# Patient Record
Sex: FEMALE | Race: WHITE | Employment: OTHER | URBAN - METROPOLITAN AREA
[De-identification: names, ages, dates, MRNs, and addresses within clinical notes are randomized per-mention and may not be internally consistent; named-entity substitution may affect disease eponyms.]

---

## 2018-01-17 ENCOUNTER — OFFICE VISIT (OUTPATIENT)
Dept: PODIATRY | Age: 63
End: 2018-01-17
Payer: MEDICARE

## 2018-01-17 VITALS
DIASTOLIC BLOOD PRESSURE: 82 MMHG | SYSTOLIC BLOOD PRESSURE: 128 MMHG | BODY MASS INDEX: 29.73 KG/M2 | WEIGHT: 185 LBS | RESPIRATION RATE: 16 BRPM | HEIGHT: 66 IN | TEMPERATURE: 98.4 F | HEART RATE: 72 BPM

## 2018-01-17 DIAGNOSIS — M79.671 PAIN IN BOTH FEET: ICD-10-CM

## 2018-01-17 DIAGNOSIS — M72.2 PLANTAR FASCIAL FIBROMATOSIS: Primary | ICD-10-CM

## 2018-01-17 DIAGNOSIS — M79.672 PAIN IN BOTH FEET: ICD-10-CM

## 2018-01-17 PROCEDURE — 4004F PT TOBACCO SCREEN RCVD TLK: CPT | Performed by: PODIATRIST

## 2018-01-17 PROCEDURE — 20550 NJX 1 TENDON SHEATH/LIGAMENT: CPT | Performed by: PODIATRIST

## 2018-01-17 PROCEDURE — 99203 OFFICE O/P NEW LOW 30 MIN: CPT | Performed by: PODIATRIST

## 2018-01-17 PROCEDURE — 3017F COLORECTAL CA SCREEN DOC REV: CPT | Performed by: PODIATRIST

## 2018-01-17 PROCEDURE — 3014F SCREEN MAMMO DOC REV: CPT | Performed by: PODIATRIST

## 2018-01-17 PROCEDURE — G8427 DOCREV CUR MEDS BY ELIG CLIN: HCPCS | Performed by: PODIATRIST

## 2018-01-17 PROCEDURE — G8419 CALC BMI OUT NRM PARAM NOF/U: HCPCS | Performed by: PODIATRIST

## 2018-01-17 PROCEDURE — G8484 FLU IMMUNIZE NO ADMIN: HCPCS | Performed by: PODIATRIST

## 2018-01-17 RX ORDER — BETAMETHASONE SODIUM PHOSPHATE AND BETAMETHASONE ACETATE 3; 3 MG/ML; MG/ML
12 INJECTION, SUSPENSION INTRA-ARTICULAR; INTRALESIONAL; INTRAMUSCULAR; SOFT TISSUE ONCE
Status: COMPLETED | OUTPATIENT
Start: 2018-01-17 | End: 2018-01-18

## 2018-01-17 RX ORDER — CLONAZEPAM 1 MG/1
1 TABLET ORAL
COMMUNITY
End: 2018-05-22

## 2018-01-17 RX ORDER — ASPIRIN 81 MG/1
1 TABLET, CHEWABLE ORAL
COMMUNITY
Start: 2016-01-19 | End: 2018-05-22

## 2018-01-17 RX ORDER — CLONAZEPAM 1 MG/1
TABLET ORAL
COMMUNITY
Start: 2017-11-13 | End: 2018-05-22

## 2018-01-18 RX ADMIN — BETAMETHASONE SODIUM PHOSPHATE AND BETAMETHASONE ACETATE 12 MG: 3; 3 INJECTION, SUSPENSION INTRA-ARTICULAR; INTRALESIONAL; INTRAMUSCULAR; SOFT TISSUE at 07:35

## 2018-02-14 ENCOUNTER — OFFICE VISIT (OUTPATIENT)
Dept: PODIATRY | Age: 63
End: 2018-02-14
Payer: MEDICARE

## 2018-02-14 VITALS — WEIGHT: 185 LBS | BODY MASS INDEX: 29.73 KG/M2 | HEIGHT: 66 IN

## 2018-02-14 DIAGNOSIS — M79.671 PAIN IN RIGHT FOOT: ICD-10-CM

## 2018-02-14 DIAGNOSIS — M72.2 PLANTAR FASCIAL FIBROMATOSIS: Primary | ICD-10-CM

## 2018-02-14 PROBLEM — F17.200 CURRENT SMOKER: Status: ACTIVE | Noted: 2018-01-05

## 2018-02-14 PROBLEM — G47.9 SLEEP DISTURBANCE: Status: ACTIVE | Noted: 2017-07-05

## 2018-02-14 PROBLEM — Z12.39 BREAST SCREENING: Status: ACTIVE | Noted: 2017-07-05

## 2018-02-14 PROBLEM — F41.8 MIXED ANXIETY DEPRESSIVE DISORDER: Status: ACTIVE | Noted: 2017-07-05

## 2018-02-14 PROBLEM — G25.81 RLS (RESTLESS LEGS SYNDROME): Status: ACTIVE | Noted: 2017-12-28

## 2018-02-14 PROBLEM — G25.81 RESTLESS LEGS SYNDROME: Status: ACTIVE | Noted: 2017-07-05

## 2018-02-14 PROCEDURE — 3014F SCREEN MAMMO DOC REV: CPT | Performed by: PODIATRIST

## 2018-02-14 PROCEDURE — G8484 FLU IMMUNIZE NO ADMIN: HCPCS | Performed by: PODIATRIST

## 2018-02-14 PROCEDURE — G8419 CALC BMI OUT NRM PARAM NOF/U: HCPCS | Performed by: PODIATRIST

## 2018-02-14 PROCEDURE — 4004F PT TOBACCO SCREEN RCVD TLK: CPT | Performed by: PODIATRIST

## 2018-02-14 PROCEDURE — G8427 DOCREV CUR MEDS BY ELIG CLIN: HCPCS | Performed by: PODIATRIST

## 2018-02-14 PROCEDURE — 3017F COLORECTAL CA SCREEN DOC REV: CPT | Performed by: PODIATRIST

## 2018-02-14 PROCEDURE — 99213 OFFICE O/P EST LOW 20 MIN: CPT | Performed by: PODIATRIST

## 2018-03-29 ENCOUNTER — OFFICE VISIT (OUTPATIENT)
Dept: PODIATRY | Age: 63
End: 2018-03-29
Payer: MEDICARE

## 2018-03-29 DIAGNOSIS — M79.671 RIGHT FOOT PAIN: ICD-10-CM

## 2018-03-29 DIAGNOSIS — M79.671 PAIN OF RIGHT HEEL: Primary | ICD-10-CM

## 2018-03-29 DIAGNOSIS — M72.2 PLANTAR FASCIITIS: ICD-10-CM

## 2018-03-29 PROBLEM — Z12.31 SCREENING MAMMOGRAM, ENCOUNTER FOR: Status: ACTIVE | Noted: 2017-07-05

## 2018-03-29 PROCEDURE — 3014F SCREEN MAMMO DOC REV: CPT | Performed by: PODIATRIST

## 2018-03-29 PROCEDURE — 4004F PT TOBACCO SCREEN RCVD TLK: CPT | Performed by: PODIATRIST

## 2018-03-29 PROCEDURE — 20550 NJX 1 TENDON SHEATH/LIGAMENT: CPT | Performed by: PODIATRIST

## 2018-03-29 PROCEDURE — G8419 CALC BMI OUT NRM PARAM NOF/U: HCPCS | Performed by: PODIATRIST

## 2018-03-29 PROCEDURE — 3017F COLORECTAL CA SCREEN DOC REV: CPT | Performed by: PODIATRIST

## 2018-03-29 PROCEDURE — 99213 OFFICE O/P EST LOW 20 MIN: CPT | Performed by: PODIATRIST

## 2018-03-29 PROCEDURE — L3020 FOOT LONGITUD/METATARSAL SUP: HCPCS | Performed by: PODIATRIST

## 2018-03-29 PROCEDURE — G8484 FLU IMMUNIZE NO ADMIN: HCPCS | Performed by: PODIATRIST

## 2018-03-29 PROCEDURE — G8427 DOCREV CUR MEDS BY ELIG CLIN: HCPCS | Performed by: PODIATRIST

## 2018-03-29 RX ORDER — BETAMETHASONE SODIUM PHOSPHATE AND BETAMETHASONE ACETATE 3; 3 MG/ML; MG/ML
6 INJECTION, SUSPENSION INTRA-ARTICULAR; INTRALESIONAL; INTRAMUSCULAR; SOFT TISSUE ONCE
Status: COMPLETED | OUTPATIENT
Start: 2018-03-29 | End: 2018-03-29

## 2018-03-29 RX ADMIN — BETAMETHASONE SODIUM PHOSPHATE AND BETAMETHASONE ACETATE 6 MG: 3; 3 INJECTION, SUSPENSION INTRA-ARTICULAR; INTRALESIONAL; INTRAMUSCULAR; SOFT TISSUE at 09:07

## 2018-03-29 NOTE — PROGRESS NOTES
level of the digits, Bilateral.  Edema absent, Bilateral.  Varicosities absent, Bilateral. Erythema absent, Bilateral    Neurological: Sensation intact to light touch to level of digits, Bilateral.  Protective sensation intact 10/10 sites via 5.07/10g Pelham-Lionel Monofilament, Bilateral.  negative Tinel's, Bilateral.  negative Valleix sign, Bilateral.      Integument: Warm, dry, supple, Bilateral.  Open lesion absent, Bilateral.  Interdigital maceration absent to web spaces 1-4, Bilateral.  Nails are normal in length, thickness and color 1-5 bilateral.  Fissures absent, Bilateral.         Asessment: Patient is a 66 y.o. female with:   1. Pain of right heel  33565 - [place] INJECT TENDON SHEATH/LIGAMENT   2. Right foot pain  97251 - [place] INJECT TENDON SHEATH/LIGAMENT   3. Plantar fasciitis  66509 - [place] INJECT TENDON SHEATH/LIGAMENT    [place] FOOT LONGITUD/METATARSAL SUP    [place] FOOT LONGITUD/METATARSAL SUP       Plan: Patient examined and evaluated. Current condition and treatment options discussed in detail. Advised pt to her condiont. After obtaining verbal consent from the patient the right medial calcaneus was cleansed with an isopropyl alcohol swab. Topical ethyl chloride was then applied to the site. And injection was informed consisting of a 3:1 mix of 0.5% Marcaine plain and dexamethasone phosphate/dexamethasone acetate (6MG/ML). The patient was educated about the possibility of steroid flair. The patient is to call any questions, comments, or concerns. Patient Instructions: Plantar Fasciitis    Plantar Fasciitis is inflammation of the long fibrous band on the bottom of the foot (plantar fascia), especially in the area of its attachment to the heel bone (calcaneus). The plantar fascia is also intimately related to the Achilles tendon and therefore stretching of the calf muscles is also important for treatment.          1. Stretching: Perform 3-4 times daily, 2-3 minutes per side      -Before getting out of bed,

## 2018-04-28 PROBLEM — Z12.31 SCREENING MAMMOGRAM, ENCOUNTER FOR: Status: RESOLVED | Noted: 2017-07-05 | Resolved: 2018-04-28

## 2018-05-01 ENCOUNTER — OFFICE VISIT (OUTPATIENT)
Dept: PODIATRY | Age: 63
End: 2018-05-01
Payer: MEDICARE

## 2018-05-01 VITALS — RESPIRATION RATE: 16 BRPM | BODY MASS INDEX: 28.04 KG/M2 | HEART RATE: 68 BPM | HEIGHT: 68 IN | WEIGHT: 185 LBS

## 2018-05-01 DIAGNOSIS — M72.2 PLANTAR FASCIAL FIBROMATOSIS: Primary | ICD-10-CM

## 2018-05-01 DIAGNOSIS — M79.671 INTRACTABLE RIGHT HEEL PAIN: ICD-10-CM

## 2018-05-01 PROCEDURE — 4004F PT TOBACCO SCREEN RCVD TLK: CPT | Performed by: PODIATRIST

## 2018-05-01 PROCEDURE — 3017F COLORECTAL CA SCREEN DOC REV: CPT | Performed by: PODIATRIST

## 2018-05-01 PROCEDURE — 99213 OFFICE O/P EST LOW 20 MIN: CPT | Performed by: PODIATRIST

## 2018-05-01 PROCEDURE — G8419 CALC BMI OUT NRM PARAM NOF/U: HCPCS | Performed by: PODIATRIST

## 2018-05-01 PROCEDURE — G8427 DOCREV CUR MEDS BY ELIG CLIN: HCPCS | Performed by: PODIATRIST

## 2018-05-22 ENCOUNTER — OFFICE VISIT (OUTPATIENT)
Dept: FAMILY MEDICINE CLINIC | Age: 63
End: 2018-05-22
Payer: MEDICARE

## 2018-05-22 VITALS
WEIGHT: 209.4 LBS | HEIGHT: 66 IN | DIASTOLIC BLOOD PRESSURE: 72 MMHG | SYSTOLIC BLOOD PRESSURE: 104 MMHG | HEART RATE: 72 BPM | BODY MASS INDEX: 33.65 KG/M2

## 2018-05-22 DIAGNOSIS — F32.A DEPRESSION, UNSPECIFIED DEPRESSION TYPE: Primary | ICD-10-CM

## 2018-05-22 PROCEDURE — G8427 DOCREV CUR MEDS BY ELIG CLIN: HCPCS | Performed by: FAMILY MEDICINE

## 2018-05-22 PROCEDURE — G8417 CALC BMI ABV UP PARAM F/U: HCPCS | Performed by: FAMILY MEDICINE

## 2018-05-22 PROCEDURE — 99203 OFFICE O/P NEW LOW 30 MIN: CPT | Performed by: FAMILY MEDICINE

## 2018-05-22 PROCEDURE — 3017F COLORECTAL CA SCREEN DOC REV: CPT | Performed by: FAMILY MEDICINE

## 2018-05-22 PROCEDURE — 4004F PT TOBACCO SCREEN RCVD TLK: CPT | Performed by: FAMILY MEDICINE

## 2018-05-22 RX ORDER — MIRTAZAPINE 15 MG/1
15 TABLET, FILM COATED ORAL NIGHTLY
COMMUNITY
End: 2018-05-22 | Stop reason: DRUGHIGH

## 2018-05-22 RX ORDER — MIRTAZAPINE 30 MG/1
30 TABLET, FILM COATED ORAL NIGHTLY
Qty: 30 TABLET | Refills: 5 | Status: SHIPPED | OUTPATIENT
Start: 2018-05-22 | End: 2018-09-11 | Stop reason: SDUPTHER

## 2018-05-22 RX ORDER — ROPINIROLE 1 MG/1
1 TABLET, FILM COATED ORAL 4 TIMES DAILY
COMMUNITY
End: 2018-09-11 | Stop reason: SDUPTHER

## 2018-05-22 RX ORDER — CLONAZEPAM 1 MG/1
1 TABLET ORAL DAILY
Status: ON HOLD | COMMUNITY
End: 2018-06-30 | Stop reason: HOSPADM

## 2018-05-22 RX ORDER — PREGABALIN 150 MG/1
150 CAPSULE ORAL 2 TIMES DAILY
COMMUNITY
End: 2018-09-11 | Stop reason: SDUPTHER

## 2018-05-22 ASSESSMENT — PATIENT HEALTH QUESTIONNAIRE - PHQ9
9. THOUGHTS THAT YOU WOULD BE BETTER OFF DEAD, OR OF HURTING YOURSELF: 1
3. TROUBLE FALLING OR STAYING ASLEEP: 3
2. FEELING DOWN, DEPRESSED OR HOPELESS: 3
5. POOR APPETITE OR OVEREATING: 3
SUM OF ALL RESPONSES TO PHQ QUESTIONS 1-9: 22
7. TROUBLE CONCENTRATING ON THINGS, SUCH AS READING THE NEWSPAPER OR WATCHING TELEVISION: 0
4. FEELING TIRED OR HAVING LITTLE ENERGY: 3
1. LITTLE INTEREST OR PLEASURE IN DOING THINGS: 3
SUM OF ALL RESPONSES TO PHQ9 QUESTIONS 1 & 2: 6
10. IF YOU CHECKED OFF ANY PROBLEMS, HOW DIFFICULT HAVE THESE PROBLEMS MADE IT FOR YOU TO DO YOUR WORK, TAKE CARE OF THINGS AT HOME, OR GET ALONG WITH OTHER PEOPLE: 0
8. MOVING OR SPEAKING SO SLOWLY THAT OTHER PEOPLE COULD HAVE NOTICED. OR THE OPPOSITE, BEING SO FIGETY OR RESTLESS THAT YOU HAVE BEEN MOVING AROUND A LOT MORE THAN USUAL: 3
6. FEELING BAD ABOUT YOURSELF - OR THAT YOU ARE A FAILURE OR HAVE LET YOURSELF OR YOUR FAMILY DOWN: 3

## 2018-06-07 ENCOUNTER — INITIAL CONSULT (OUTPATIENT)
Dept: BEHAVIORAL/MENTAL HEALTH | Age: 63
End: 2018-06-07
Payer: MEDICARE

## 2018-06-07 DIAGNOSIS — F33.1 MAJOR DEPRESSIVE DISORDER, RECURRENT EPISODE, MODERATE DEGREE (HCC): Primary | ICD-10-CM

## 2018-06-07 PROCEDURE — 90791 PSYCH DIAGNOSTIC EVALUATION: CPT | Performed by: SOCIAL WORKER

## 2018-06-28 ENCOUNTER — APPOINTMENT (OUTPATIENT)
Dept: GENERAL RADIOLOGY | Age: 63
DRG: 812 | End: 2018-06-28
Payer: MEDICARE

## 2018-06-28 ENCOUNTER — APPOINTMENT (OUTPATIENT)
Dept: CT IMAGING | Age: 63
DRG: 812 | End: 2018-06-28
Payer: MEDICARE

## 2018-06-28 ENCOUNTER — HOSPITAL ENCOUNTER (INPATIENT)
Age: 63
LOS: 2 days | Discharge: HOME | DRG: 812 | End: 2018-06-30
Attending: EMERGENCY MEDICINE | Admitting: INTERNAL MEDICINE
Payer: MEDICARE

## 2018-06-28 DIAGNOSIS — R41.82 ALTERED MENTAL STATUS, UNSPECIFIED ALTERED MENTAL STATUS TYPE: Primary | ICD-10-CM

## 2018-06-28 PROBLEM — E04.1 THYROID NODULE: Status: ACTIVE | Noted: 2018-06-28

## 2018-06-28 LAB
-: NORMAL
ABSOLUTE EOS #: 0.14 K/UL (ref 0–0.44)
ABSOLUTE IMMATURE GRANULOCYTE: 0.05 K/UL (ref 0–0.3)
ABSOLUTE LYMPH #: 2.29 K/UL (ref 1.1–3.7)
ABSOLUTE MONO #: 0.82 K/UL (ref 0.1–1.2)
ACETAMINOPHEN LEVEL: <5 UG/ML (ref 10–30)
ALBUMIN SERPL-MCNC: 4.2 G/DL (ref 3.5–5.2)
ALBUMIN/GLOBULIN RATIO: 1.4 (ref 1–2.5)
ALLEN TEST: NORMAL
ALP BLD-CCNC: 82 U/L (ref 35–104)
ALT SERPL-CCNC: 25 U/L (ref 5–33)
AMMONIA: 63 UMOL/L (ref 11–51)
AMORPHOUS: NORMAL
AMPHETAMINE SCREEN URINE: NEGATIVE
ANION GAP SERPL CALCULATED.3IONS-SCNC: 10 MMOL/L (ref 9–17)
ANION GAP: 5 MMOL/L (ref 7–16)
AST SERPL-CCNC: 41 U/L
BACTERIA: NORMAL
BARBITURATE SCREEN URINE: NEGATIVE
BASOPHILS # BLD: 0 % (ref 0–2)
BASOPHILS ABSOLUTE: 0.05 K/UL (ref 0–0.2)
BENZODIAZEPINE SCREEN, URINE: POSITIVE
BILIRUB SERPL-MCNC: 0.37 MG/DL (ref 0.3–1.2)
BILIRUBIN DIRECT: <0.08 MG/DL
BILIRUBIN URINE: NEGATIVE
BILIRUBIN, INDIRECT: ABNORMAL MG/DL (ref 0–1)
BUN BLDV-MCNC: 13 MG/DL (ref 8–23)
BUN/CREAT BLD: NORMAL (ref 9–20)
BUPRENORPHINE URINE: ABNORMAL
CALCIUM SERPL-MCNC: 9 MG/DL (ref 8.6–10.4)
CANNABINOID SCREEN URINE: NEGATIVE
CASTS UA: NORMAL /LPF (ref 0–8)
CHLORIDE BLD-SCNC: 106 MMOL/L (ref 98–107)
CO2: 27 MMOL/L (ref 20–31)
COCAINE METABOLITE, URINE: NEGATIVE
COLOR: YELLOW
COMMENT UA: ABNORMAL
CREAT SERPL-MCNC: 0.53 MG/DL (ref 0.5–0.9)
CRYSTALS, UA: NORMAL /HPF
DIFFERENTIAL TYPE: ABNORMAL
EKG ATRIAL RATE: 92 BPM
EKG P AXIS: 41 DEGREES
EKG P-R INTERVAL: 138 MS
EKG Q-T INTERVAL: 374 MS
EKG QRS DURATION: 92 MS
EKG QTC CALCULATION (BAZETT): 462 MS
EKG R AXIS: 22 DEGREES
EKG T AXIS: 4 DEGREES
EKG VENTRICULAR RATE: 92 BPM
EOSINOPHILS RELATIVE PERCENT: 1 % (ref 1–4)
EPITHELIAL CELLS UA: NORMAL /HPF (ref 0–5)
ETHANOL PERCENT: <0.01 %
ETHANOL: <10 MG/DL
FIO2: NORMAL
GFR AFRICAN AMERICAN: >60 ML/MIN
GFR NON-AFRICAN AMERICAN: >60 ML/MIN
GFR NON-AFRICAN AMERICAN: >60 ML/MIN
GFR SERPL CREATININE-BSD FRML MDRD: >60 ML/MIN
GFR SERPL CREATININE-BSD FRML MDRD: NORMAL ML/MIN/{1.73_M2}
GLOBULIN: ABNORMAL G/DL (ref 1.5–3.8)
GLUCOSE BLD-MCNC: 87 MG/DL (ref 74–100)
GLUCOSE BLD-MCNC: 90 MG/DL (ref 70–99)
GLUCOSE URINE: NEGATIVE
HCO3 VENOUS: 27.6 MMOL/L (ref 22–29)
HCT VFR BLD CALC: 50.5 % (ref 36.3–47.1)
HEMOGLOBIN: 16.6 G/DL (ref 11.9–15.1)
IMMATURE GRANULOCYTES: 0 %
KETONES, URINE: NEGATIVE
LACTIC ACID, WHOLE BLOOD: 3.3 MMOL/L (ref 0.7–2.1)
LEUKOCYTE ESTERASE, URINE: NEGATIVE
LIPASE: 12 U/L (ref 13–60)
LYMPHOCYTES # BLD: 16 % (ref 24–43)
MCH RBC QN AUTO: 31.6 PG (ref 25.2–33.5)
MCHC RBC AUTO-ENTMCNC: 32.9 G/DL (ref 28.4–34.8)
MCV RBC AUTO: 96 FL (ref 82.6–102.9)
MDMA URINE: ABNORMAL
METHADONE SCREEN, URINE: NEGATIVE
METHAMPHETAMINE, URINE: ABNORMAL
MODE: NORMAL
MONOCYTES # BLD: 6 % (ref 3–12)
MUCUS: NORMAL
MYOGLOBIN: 200 NG/ML (ref 25–58)
NEGATIVE BASE EXCESS, VEN: NORMAL (ref 0–2)
NITRITE, URINE: NEGATIVE
NRBC AUTOMATED: 0 PER 100 WBC
O2 DEVICE/FLOW/%: NORMAL
O2 SAT, VEN: 65 % (ref 60–85)
OPIATES, URINE: NEGATIVE
OTHER OBSERVATIONS UA: NORMAL
OXYCODONE SCREEN URINE: NEGATIVE
PATIENT TEMP: NORMAL
PCO2, VEN: 50 MM HG (ref 41–51)
PDW BLD-RTO: 13.5 % (ref 11.8–14.4)
PH UA: 7 (ref 5–8)
PH VENOUS: 7.35 (ref 7.32–7.43)
PHENCYCLIDINE, URINE: NEGATIVE
PLATELET # BLD: 239 K/UL (ref 138–453)
PLATELET ESTIMATE: ABNORMAL
PMV BLD AUTO: 10.2 FL (ref 8.1–13.5)
PO2, VEN: 35.7 MM HG (ref 30–50)
POC CHLORIDE: 107 MMOL/L (ref 98–107)
POC CREATININE: 0.73 MG/DL (ref 0.51–1.19)
POC HEMATOCRIT: 54 % (ref 36–46)
POC HEMOGLOBIN: 18.5 G/DL (ref 12–16)
POC IONIZED CALCIUM: 1.08 MMOL/L (ref 1.15–1.33)
POC LACTIC ACID: 2 MMOL/L (ref 0.56–1.39)
POC PCO2 TEMP: NORMAL MM HG
POC PH TEMP: NORMAL
POC PO2 TEMP: NORMAL MM HG
POC POTASSIUM: 7.5 MMOL/L (ref 3.5–4.5)
POC SODIUM: 140 MMOL/L (ref 138–146)
POC TROPONIN I: 0 NG/ML (ref 0–0.1)
POC TROPONIN INTERP: NORMAL
POSITIVE BASE EXCESS, VEN: 1 (ref 0–3)
POTASSIUM SERPL-SCNC: 4.6 MMOL/L (ref 3.7–5.3)
PROLACTIN: 11.77 UG/L (ref 4.79–23.3)
PROPOXYPHENE, URINE: ABNORMAL
PROTEIN UA: NEGATIVE
RBC # BLD: 5.26 M/UL (ref 3.95–5.11)
RBC # BLD: ABNORMAL 10*6/UL
RBC UA: NORMAL /HPF (ref 0–4)
RENAL EPITHELIAL, UA: NORMAL /HPF
SALICYLATE LEVEL: <1 MG/DL (ref 3–10)
SAMPLE SITE: NORMAL
SEG NEUTROPHILS: 77 % (ref 36–65)
SEGMENTED NEUTROPHILS ABSOLUTE COUNT: 11 K/UL (ref 1.5–8.1)
SODIUM BLD-SCNC: 143 MMOL/L (ref 135–144)
SPECIFIC GRAVITY UA: 1.01 (ref 1–1.03)
TEST INFORMATION: ABNORMAL
TOTAL CK: 1193 U/L (ref 26–192)
TOTAL CO2, VENOUS: 29 MMOL/L (ref 23–30)
TOTAL PROTEIN: 7.1 G/DL (ref 6.4–8.3)
TOXIC TRICYCLIC SC,BLOOD: NEGATIVE
TRICHOMONAS: NORMAL
TRICYCLIC ANTIDEPRESSANTS, UR: ABNORMAL
TROPONIN INTERP: NORMAL
TROPONIN T: <0.03 NG/ML
TURBIDITY: CLEAR
URINE HGB: ABNORMAL
UROBILINOGEN, URINE: NORMAL
WBC # BLD: 14.4 K/UL (ref 3.5–11.3)
WBC # BLD: ABNORMAL 10*3/UL
WBC UA: NORMAL /HPF (ref 0–5)
YEAST: NORMAL

## 2018-06-28 PROCEDURE — 81001 URINALYSIS AUTO W/SCOPE: CPT

## 2018-06-28 PROCEDURE — 72125 CT NECK SPINE W/O DYE: CPT

## 2018-06-28 PROCEDURE — 93005 ELECTROCARDIOGRAM TRACING: CPT

## 2018-06-28 PROCEDURE — 82435 ASSAY OF BLOOD CHLORIDE: CPT

## 2018-06-28 PROCEDURE — 2580000003 HC RX 258: Performed by: INTERNAL MEDICINE

## 2018-06-28 PROCEDURE — 2580000003 HC RX 258: Performed by: HOSPITALIST

## 2018-06-28 PROCEDURE — 6370000000 HC RX 637 (ALT 250 FOR IP): Performed by: EMERGENCY MEDICINE

## 2018-06-28 PROCEDURE — 82330 ASSAY OF CALCIUM: CPT

## 2018-06-28 PROCEDURE — 82803 BLOOD GASES ANY COMBINATION: CPT

## 2018-06-28 PROCEDURE — G0480 DRUG TEST DEF 1-7 CLASSES: HCPCS

## 2018-06-28 PROCEDURE — 87040 BLOOD CULTURE FOR BACTERIA: CPT

## 2018-06-28 PROCEDURE — 85014 HEMATOCRIT: CPT

## 2018-06-28 PROCEDURE — 83874 ASSAY OF MYOGLOBIN: CPT

## 2018-06-28 PROCEDURE — 84146 ASSAY OF PROLACTIN: CPT

## 2018-06-28 PROCEDURE — 82565 ASSAY OF CREATININE: CPT

## 2018-06-28 PROCEDURE — 6370000000 HC RX 637 (ALT 250 FOR IP): Performed by: HOSPITALIST

## 2018-06-28 PROCEDURE — 71045 X-RAY EXAM CHEST 1 VIEW: CPT

## 2018-06-28 PROCEDURE — 1200000000 HC SEMI PRIVATE

## 2018-06-28 PROCEDURE — 84132 ASSAY OF SERUM POTASSIUM: CPT

## 2018-06-28 PROCEDURE — 80307 DRUG TEST PRSMV CHEM ANLYZR: CPT

## 2018-06-28 PROCEDURE — 84295 ASSAY OF SERUM SODIUM: CPT

## 2018-06-28 PROCEDURE — 82140 ASSAY OF AMMONIA: CPT

## 2018-06-28 PROCEDURE — 80076 HEPATIC FUNCTION PANEL: CPT

## 2018-06-28 PROCEDURE — 82947 ASSAY GLUCOSE BLOOD QUANT: CPT

## 2018-06-28 PROCEDURE — 83605 ASSAY OF LACTIC ACID: CPT

## 2018-06-28 PROCEDURE — 83690 ASSAY OF LIPASE: CPT

## 2018-06-28 PROCEDURE — 36415 COLL VENOUS BLD VENIPUNCTURE: CPT

## 2018-06-28 PROCEDURE — 6360000002 HC RX W HCPCS: Performed by: HOSPITALIST

## 2018-06-28 PROCEDURE — 85025 COMPLETE CBC W/AUTO DIFF WBC: CPT

## 2018-06-28 PROCEDURE — 82550 ASSAY OF CK (CPK): CPT

## 2018-06-28 PROCEDURE — 70450 CT HEAD/BRAIN W/O DYE: CPT

## 2018-06-28 PROCEDURE — 80048 BASIC METABOLIC PNL TOTAL CA: CPT

## 2018-06-28 PROCEDURE — 84484 ASSAY OF TROPONIN QUANT: CPT

## 2018-06-28 PROCEDURE — 99285 EMERGENCY DEPT VISIT HI MDM: CPT

## 2018-06-28 RX ORDER — ONDANSETRON 2 MG/ML
4 INJECTION INTRAMUSCULAR; INTRAVENOUS EVERY 6 HOURS PRN
Status: DISCONTINUED | OUTPATIENT
Start: 2018-06-28 | End: 2018-06-30 | Stop reason: HOSPADM

## 2018-06-28 RX ORDER — SODIUM CHLORIDE 0.9 % (FLUSH) 0.9 %
10 SYRINGE (ML) INJECTION EVERY 12 HOURS SCHEDULED
Status: DISCONTINUED | OUTPATIENT
Start: 2018-06-28 | End: 2018-06-30 | Stop reason: HOSPADM

## 2018-06-28 RX ORDER — SODIUM CHLORIDE 0.9 % (FLUSH) 0.9 %
10 SYRINGE (ML) INJECTION PRN
Status: DISCONTINUED | OUTPATIENT
Start: 2018-06-28 | End: 2018-06-30 | Stop reason: HOSPADM

## 2018-06-28 RX ORDER — POTASSIUM CHLORIDE 20MEQ/15ML
40 LIQUID (ML) ORAL PRN
Status: DISCONTINUED | OUTPATIENT
Start: 2018-06-28 | End: 2018-06-30 | Stop reason: HOSPADM

## 2018-06-28 RX ORDER — 0.9 % SODIUM CHLORIDE 0.9 %
1000 INTRAVENOUS SOLUTION INTRAVENOUS ONCE
Status: COMPLETED | OUTPATIENT
Start: 2018-06-28 | End: 2018-06-28

## 2018-06-28 RX ORDER — MAGNESIUM SULFATE 1 G/100ML
1 INJECTION INTRAVENOUS PRN
Status: DISCONTINUED | OUTPATIENT
Start: 2018-06-28 | End: 2018-06-30 | Stop reason: HOSPADM

## 2018-06-28 RX ORDER — ROPINIROLE 1 MG/1
1 TABLET, FILM COATED ORAL ONCE
Status: COMPLETED | OUTPATIENT
Start: 2018-06-28 | End: 2018-06-28

## 2018-06-28 RX ORDER — POTASSIUM CHLORIDE 7.45 MG/ML
10 INJECTION INTRAVENOUS PRN
Status: DISCONTINUED | OUTPATIENT
Start: 2018-06-28 | End: 2018-06-30 | Stop reason: HOSPADM

## 2018-06-28 RX ORDER — SODIUM CHLORIDE 9 MG/ML
INJECTION, SOLUTION INTRAVENOUS CONTINUOUS
Status: DISCONTINUED | OUTPATIENT
Start: 2018-06-28 | End: 2018-06-29

## 2018-06-28 RX ORDER — POTASSIUM CHLORIDE 20 MEQ/1
40 TABLET, EXTENDED RELEASE ORAL PRN
Status: DISCONTINUED | OUTPATIENT
Start: 2018-06-28 | End: 2018-06-30 | Stop reason: HOSPADM

## 2018-06-28 RX ORDER — FAMOTIDINE 20 MG/1
20 TABLET, FILM COATED ORAL 2 TIMES DAILY
Status: DISCONTINUED | OUTPATIENT
Start: 2018-06-28 | End: 2018-06-30 | Stop reason: HOSPADM

## 2018-06-28 RX ORDER — LACTULOSE 10 G/15ML
20 SOLUTION ORAL 3 TIMES DAILY
Status: DISCONTINUED | OUTPATIENT
Start: 2018-06-28 | End: 2018-06-29

## 2018-06-28 RX ADMIN — SODIUM CHLORIDE 1000 ML: 9 INJECTION, SOLUTION INTRAVENOUS at 18:50

## 2018-06-28 RX ADMIN — ROPINIROLE HYDROCHLORIDE 1 MG: 1 TABLET, FILM COATED ORAL at 16:47

## 2018-06-28 RX ADMIN — ENOXAPARIN SODIUM 40 MG: 40 INJECTION SUBCUTANEOUS at 23:18

## 2018-06-28 RX ADMIN — LACTULOSE 20 G: 20 SOLUTION ORAL at 23:19

## 2018-06-28 RX ADMIN — FAMOTIDINE 20 MG: 20 TABLET, FILM COATED ORAL at 23:17

## 2018-06-28 RX ADMIN — SODIUM CHLORIDE: 9 INJECTION, SOLUTION INTRAVENOUS at 18:50

## 2018-06-28 RX ADMIN — Medication 10 ML: at 23:20

## 2018-06-28 RX ADMIN — Medication 10 ML: at 23:21

## 2018-06-28 ASSESSMENT — PAIN SCALES - GENERAL: PAINLEVEL_OUTOF10: 0

## 2018-06-28 NOTE — ED PROVIDER NOTES
[facility]     Emergency Department     Faculty Attestation    I performed a history and physical examination of the patient and discussed management with the resident. I reviewed the residents note and agree with the documented findings and plan of care. Any areas of disagreement are noted on the chart. I was personally present for the key portions of any procedures. I have documented in the chart those procedures where I was not present during the key portions. I have reviewed the emergency nurses triage note. I agree with the chief complaint, past medical history, past surgical history, allergies, medications, social and family history as documented unless otherwise noted below. For Physician Assistant/ Nurse Practitioner cases/documentation I have personally evaluated this patient and have completed at least one if not all key elements of the E/M (history, physical exam, and MDM). Additional findings are as noted. Primary Care Physician:  Blu Gutierrez DO    CHIEF COMPLAINT       Chief Complaint   Patient presents with    Altered Mental Status       RECENT VITALS:   Temp: 97.7 °F (36.5 °C),  Pulse: 92, Resp: 17, BP: (!) 126/57    LABS:  Labs Reviewed   HGB/HCT - Abnormal; Notable for the following:        Result Value    POC Hemoglobin 18.5 (*)     POC Hematocrit 54 (*)     All other components within normal limits   POTASSIUM (POC) - Abnormal; Notable for the following:     POC Potassium 7.5 (*)     All other components within normal limits   CALCIUM, IONIC (POC) - Abnormal; Notable for the following:     POC Ionized Calcium 1.08 (*)     All other components within normal limits   LACTIC ACID,POINT OF CARE - Abnormal; Notable for the following:     POC Lactic Acid 2.00 (*)     All other components within normal limits   ANION GAP (CALC) POC - Abnormal; Notable for the following:      Anion Gap 5 (*)     All other components within normal limits   SODIUM (POC)   CHLORIDE

## 2018-06-28 NOTE — H&P
Internal Medicine       History and Physical Examination       History Obtained From:  Patient     CHIEF COMPLAINT: Altered mental status    HISTORY OF PRESENT ILLNESS:    The patient is a pleasant 66 y.o. female with significant medical history of restless leg syndrome, osteoarthritis, depression, insomnia, and frequent UTIs presented to [facility] emergency Department with altered mental status via EMS. It is reported that patient did not show up for work today, patient's employer called family who attempted to get a hold of patient and were unable to. Family called EMS. EMS arrived the patient's house and found her to have altered mental status, multiple bruises to the face arms and hips, looking like the patient had fallen at home. Patient states she takes Klonopin and tramadol the night before. Patient denies any self-harm attempt. Patient denied any trauma. Patient denies any drug or alcohol use. Patient is difficult to arouse and sleepy. HPI is limited secondary to decreased patient interaction. Patient does complain of some myalgias. Patient denies any fever, nausea, vomiting, or chills. Patient appears clinically volume depleted with dry oral mucous membranes. Patient has multiple abrasions and bruising to the face. No observable bleeding. Pupils are 4 mm and equally reactive to light. No nystagmus seen. No observable tremors. No focal deficits seen on examination. Patient is not cooperative with finger to nose or heel to shin test.  There is no nuchal rigidity. Patient does complain of a mild headache. Patient's serum ammonia level was 63. AST is 41. No abdominal tenderness or distention. Patient denied any history of liver failure. Blood tox screen was negative, urine drug screen was positive for benzodiazepines. EKG does not show any acute changes. Electrolytes within normal limits. Lactic acid is 3.3. CK is 1193, myoglobin 200, troponins negative.   WBC 14.4, Normal sinus rhythm, normal rate, normal axis, no ectopy, no acute ST segment changes, no acute T-wave changes, no Q waves, no acute changes on EKG. CXR: 6/28/2018 portable chest x-ray  Narrative   EXAMINATION:   SINGLE XRAY VIEW OF THE CHEST       6/28/2018 2:02 pm       COMPARISON:   None.       HISTORY:   ORDERING SYSTEM PROVIDED HISTORY: Altered mental status   TECHNOLOGIST PROVIDED HISTORY:   Reason for exam:->Altered mental status       FINDINGS:   Cardiac silhouette is borderline prominent and there is generalized   interstitial prominence with central vascular congestion.  No obvious pleural   effusion or pneumothorax.  Osseous structures and soft tissues are grossly   intact and stable.           Impression   Borderline cardiomegaly.  Generalized interstitial prominence and bronchial   wall thickening, suggestive of bronchiolitis.  Right basilar   atelectasis/scarring.       Conventional PA and lateral radiographs of the chest should be considered   when clinically visible. 6/28/2018 CT of the head without contrast  Narrative   EXAMINATION:   CT OF THE HEAD WITHOUT CONTRAST  6/28/2018 2:53 pm       TECHNIQUE:   CT of the head was performed without the administration of intravenous   contrast. Dose modulation, iterative reconstruction, and/or weight based   adjustment of the mA/kV was utilized to reduce the radiation dose to as low   as reasonably achievable.       COMPARISON:   None.       HISTORY:   ORDERING SYSTEM PROVIDED HISTORY: DECREASED ALERTNESS   TECHNOLOGIST PROVIDED HISTORY:   Has a \"code stroke\" or \"stroke alert\" been called? ->No       FINDINGS:   BRAIN/VENTRICLES: There is no acute intracranial hemorrhage, mass effect or   midline shift.  No abnormal extra-axial fluid collection.  The gray-white   differentiation is maintained without evidence of an acute infarct.  There is   no evidence of hydrocephalus.       ORBITS: The visualized portion of the orbits demonstrate no acute abnormality.       SINUSES: The visualized paranasal sinuses and mastoid air cells demonstrate   no acute abnormality.       SOFT TISSUES/SKULL:  No acute abnormality of the visualized skull or soft   tissues.           Impression   No acute intracranial abnormality. 6/28/2018 CT cervical spine without contrast  Narrative   EXAMINATION:   CT OF THE CERVICAL SPINE WITHOUT CONTRAST 6/28/2018 2:53 pm       TECHNIQUE:   CT of the cervical spine was performed without the administration of   intravenous contrast. Multiplanar reformatted images are provided for review. Dose modulation, iterative reconstruction, and/or weight based adjustment of   the mA/kV was utilized to reduce the radiation dose to as low as reasonably   achievable.       COMPARISON:   None.       HISTORY:   ORDERING SYSTEM PROVIDED HISTORY: C-SPINE TRAUMA, NEXUS/CCR NEGATIVE, LOW RISK       FINDINGS:   BONES/ALIGNMENT: There is no evidence of an acute cervical spine fracture.  2   mm anterolisthesis of C3 on C4 likely related to left-sided facet arthropathy.       DEGENERATIVE CHANGES: Degenerative disc disease at C5-6.  Facet arthropathy,   most prevalent on the left at C3-4 through C6-7.  Degenerative change at the   C1-2 articulation anteriorly.       SOFT TISSUES: There is no prevertebral soft tissue swelling.  Densely   calcified right thyroid nodule measuring 19 x 27 mm.  A 2nd peripherally   calcified nodule is noted measuring 11 mm.  Focal parenchymal calcification   anteriorly on the left is also apparent.           Impression   No acute abnormality of the cervical spine.       Degenerative disc disease at C5-6.  Multilevel predominantly left-sided facet   arthropathy.       Large calcified right thyroid nodule at 27 mm.  Additional partially   calcified nodules bilaterally.  Consider nonemergent ultrasound follow-up.        ASSESSMENT:    Principal Problem:    Acute metabolic encephalopathy  Active Problems:    Altered mental status Thyroid nodule, Rt. Lobe. 27 mm    Elevated CK    Elevated myoglobin level    Lactic acidosis    Hyperammonemia (HCC)    Leukocytosis    Polycythemia secondary to smoking    Dehydration    Benzodiazepine toxicity  Resolved Problems:    * No resolved hospital problems. *      PLAN:    Acute metabolic encephalopathy of unknown cause but suspected benzodiazepine overdose and possible toxicity. Hold home dose Klonopin  Supportive care  Fall precautions  Up with assistance  Blood tox screen negative  Urine tox screen positive for benzodiazepines  Ammonia level 63, will give lactulose 3 times a day, no history of liver failure. LFTs reviewed, AST 41, lipase 12, alk phos normal, normal bilirubin  Follow-up blood cultures  Trend troponins so far negative. Prolactin level 11.7. Will hold off giving any Romazicon at this time as patient in stable and do not want to induce benzodiazepine withdrawal increasing the possibility of seizures. CBC, BMP, LFT, magnesium in a.m. No evidence of urinary tract infection. CT of the head and neck reviewed, no acute changes. See above  If mental status fails to improve will consult neurology. Lactic acidosis likely secondary to dehydration  Given normal saline bolus 1 L ×1 now  Continue IV fluids normal saline at 125 mL per hour  Trend lactic acid  Patient has elevated CK and myoglobin levels not in the rhabdomyolysis range. Polycythemia likely secondary to smoking and likely component of dehydration. Repeat CBC in a.m. Leukocytosis, possibly reactive and related to dehydration. Monitor CBC in a.m. Will likely correct with hydration. Calcified Right Lobe Thyroid nodule seen on CT of the neck, 27 mm. Will need thyroid ultrasound as outpatient. Obtain TSH and T4.     DVT prophylaxis  Lovenox 40 mg subcu daily    GI prophylaxis  Pepcid 20 mg by mouth twice a day    PT OT to eval and treat     for discharge planning, patient may need SNF placement or at least will need home care.     Magali Judge MD   PGY-2, Department of Internal medicine   [place], [place]

## 2018-06-28 NOTE — ED NOTES
Several attempts made to obtain urine via straight cath by Shira Nash, unsuccessful.       Rehan Baker RN  06/28/18 [id number]

## 2018-06-28 NOTE — ED NOTES
Pt to ED c/o altered mental status. Pt's brother called 911 after not hearing from pt for an extended period of time and not showing up to work. Pt on arrival moaning and A&O x2 and unable to follow commands. Pt states that she took some klonopin and trazone last night, pt denies any ETOH or other drug use. Pt initally had sats of 88% per first responders, pt 91% on RA on arrival, pt placed on 2L NC, sats now 95%. Pt placed on full cardiac monitor.       Bernard Dominguez, MAURY  06/28/18 [id number]

## 2018-06-28 NOTE — ED PROVIDER NOTES
[address]  ED  Emergency Department Encounter  Emergency Medicine Resident     Pt Name: Eliud Castillo  MRN: [id number]  [place] 1955  Date of evaluation: 6/28/18  PCP:  Nisha Contreras, [facility]       Chief Complaint   Patient presents with    Altered Mental Status       HISTORY OF PRESENT ILLNESS  (Location/Symptom, Timing/Onset, Context/Setting, Quality, Duration, Modifying Factors, Severity.)      Eliud Castillo is a 66 y.o. female who presents with Altered mental status. Patient did not show up to work today and EMS was called by pt's brother after she did not respond, subsequently EMS found patient altered. Patient states she did take Klonopin and tramadol last night. Denies self-harm attempt. She states no injury or trauma. Denies alcohol or illicit drug use. PAST MEDICAL / SURGICAL / SOCIAL / FAMILY HISTORY      has a past medical history of Depression; Frequent UTI; Infertility, female; Osteoarthritis; and Restless leg syndrome. has a past surgical history that includes Dilation & curettage (1994); Tonsillectomy and adenoidectomy; Eye surgery; and Cataract removal (2007). Social History     Social History    Marital status: Single     Spouse name: N/A    Number of children: N/A    Years of education: N/A     Occupational History    Not on file.      Social History Main Topics    Smoking status: Current Every Day Smoker     Packs/day: 1.00     Years: 10.00     Start date: 1/1/2008    Smokeless tobacco: Never Used    Alcohol use Yes      Comment: rarely    Drug use: No    Sexual activity: No     Other Topics Concern    Not on file     Social History Narrative    No narrative on file       Family History   Problem Relation Age of Onset    Hypertension Mother     Osteoporosis Mother     Cancer Mother         pancreatic    Diabetes Paternal Grandmother     Lung Cancer Father     Cancer Father        Allergies:  Pcn [penicillins] and of motion. She exhibits no edema, tenderness or deformity. Neurological: She is alert. She has normal reflexes. Patient reports she is at [facility]. Believes it is 0. States the month is May. Oriented to self and birthdate. Sensation of face, upper, lower extremities intact and equal bilaterally. DTRs patella, brachial radialis, biceps 2/4 equal bilaterally.  strength, flexion/extension, flexion of the hips, or see/plantar flexion of the ankles 5/5 equal bilaterally. Patient shows poor coordination to finger to nose testing. No pronator drift. Skin: Skin is warm. She is not diaphoretic.        DIFFERENTIAL  DIAGNOSIS     PLAN (LABS / IMAGING / EKG):  Orders Placed This Encounter   Procedures    CULTURE BLOOD #1    Culture blood #2    CT HEAD WO CONTRAST    XR CHEST PORTABLE    CT CERVICAL SPINE WO CONTRAST    TOX SCR, BLD, ED    Urine Drug Screen    Urinalysis    Ammonia    Basic Metabolic Panel    Hepatic Function Panel    Lactic Acid, Whole Blood    Troponin    Hemoglobin and hematocrit, blood    SODIUM (POC)    POTASSIUM (POC)    CHLORIDE (POC)    CALCIUM, IONIC (POC)    CBC Auto Differential    PREVIOUS SPECIMEN    Troponin    Microscopic Urinalysis    Basic Metabolic Panel w/ Reflex to MG    Hepatic function panel    Magnesium    CBC auto differential    Protime-INR    APTT    Troponin    CK    MYOGLOBIN, SERUM    LIPASE    PROLACTIN    DIET GENERAL;    Notify physician    Notify physician    Vital signs per unit routine    Tobacco cessation education    Telemetry monitoring    Notify physician    Up with assistance    Daily weights    Intake and output    Place intermittent pneumatic compression device    Full Code    Inpatient consult to Internal Medicine    Inpatient consult to Case Management    OT eval and treat    PT evaluation and treat    Initiate Oxygen Therapy Protocol    Pulse oximetry, continuous    Venous Blood Gas, POC    Methadone Screen, Urine NEGATIVE NEG    Opiates, Urine NEGATIVE NEG    Phencyclidine, Urine NEGATIVE NEG    Propoxyphene, Urine NOT REPORTED NEG    Cannabinoid Scrn, Ur NEGATIVE NEG    Oxycodone Screen, Ur NEGATIVE NEG    Methamphetamine, Urine NOT REPORTED NEG    Tricyclic Antidepressants, Ur NOT REPORTED NEG    MDMA URINE NOT REPORTED NEG    Buprenorphine Urine NOT REPORTED NEG    Test Information       Assay provides medical screening only.   The absence of expected drug(s) and/or   Urinalysis   Result Value Ref Range    Color, UA YELLOW YEL    Turbidity UA CLEAR CLEAR    Glucose, Ur NEGATIVE NEG    Bilirubin Urine NEGATIVE NEG    Ketones, Urine NEGATIVE NEG    Specific Gravity, UA 1.014 1.005 - 1.030    Urine Hgb TRACE (A) NEG    pH, UA 7.0 5.0 - 8.0    Protein, UA NEGATIVE NEG    Urobilinogen, Urine Normal NORM    Nitrite, Urine NEGATIVE NEG    Leukocyte Esterase, Urine NEGATIVE NEG    Urinalysis Comments NOT REPORTED    Ammonia   Result Value Ref Range    Ammonia 63 (H) 11 - 51 umol/L   Basic Metabolic Panel   Result Value Ref Range    Glucose 90 70 - 99 mg/dL    BUN 13 8 - 23 mg/dL    CREATININE 0.53 0.50 - 0.90 mg/dL    Bun/Cre Ratio NOT REPORTED 9 - 20    Calcium 9.0 8.6 - 10.4 mg/dL    Sodium 143 135 - 144 mmol/L    Potassium 4.6 3.7 - 5.3 mmol/L    Chloride 106 98 - 107 mmol/L    CO2 27 20 - 31 mmol/L    Anion Gap 10 9 - 17 mmol/L    GFR Non-African American >60 >60 mL/min    GFR African American >60 >60 mL/min    GFR Comment          GFR Staging NOT REPORTED    Hepatic Function Panel   Result Value Ref Range    Alb 4.2 3.5 - 5.2 g/dL    Alkaline Phosphatase 82 35 - 104 U/L    ALT 25 5 - 33 U/L    AST 41 (H) <32 U/L    Total Bilirubin 0.37 0.3 - 1.2 mg/dL    Bilirubin, Direct <0.08 <0.31 mg/dL    Bilirubin, Indirect CANNOT BE CALCULATED 0.00 - 1.00 mg/dL    Total Protein 7.1 6.4 - 8.3 g/dL    Globulin NOT REPORTED 1.5 - 3.8 g/dL    Albumin/Globulin Ratio 1.4 1.0 - 2.5   Lactic Acid, Whole Blood   Result modulation, iterative reconstruction, and/or weight based adjustment of the mA/kV was utilized to reduce the radiation dose to as low as reasonably achievable. COMPARISON: None. HISTORY: ORDERING SYSTEM PROVIDED HISTORY: DECREASED ALERTNESS TECHNOLOGIST PROVIDED HISTORY: Has a \"code stroke\" or \"stroke alert\" been called? ->No FINDINGS: BRAIN/VENTRICLES: There is no acute intracranial hemorrhage, mass effect or midline shift. No abnormal extra-axial fluid collection. The gray-white differentiation is maintained without evidence of an acute infarct. There is no evidence of hydrocephalus. ORBITS: The visualized portion of the orbits demonstrate no acute abnormality. SINUSES: The visualized paranasal sinuses and mastoid air cells demonstrate no acute abnormality. SOFT TISSUES/SKULL:  No acute abnormality of the visualized skull or soft tissues. No acute intracranial abnormality. Ct Cervical Spine Wo Contrast    Result Date: 6/28/2018  EXAMINATION: CT OF THE CERVICAL SPINE WITHOUT CONTRAST 6/28/2018 2:53 pm TECHNIQUE: CT of the cervical spine was performed without the administration of intravenous contrast. Multiplanar reformatted images are provided for review. Dose modulation, iterative reconstruction, and/or weight based adjustment of the mA/kV was utilized to reduce the radiation dose to as low as reasonably achievable. COMPARISON: None. HISTORY: ORDERING SYSTEM PROVIDED HISTORY: C-SPINE TRAUMA, NEXUS/CCR NEGATIVE, LOW RISK FINDINGS: BONES/ALIGNMENT: There is no evidence of an acute cervical spine fracture. 2 mm anterolisthesis of C3 on C4 likely related to left-sided facet arthropathy. DEGENERATIVE CHANGES: Degenerative disc disease at C5-6. Facet arthropathy, most prevalent on the left at C3-4 through C6-7. Degenerative change at the C1-2 articulation anteriorly. SOFT TISSUES: There is no prevertebral soft tissue swelling. Densely calcified right thyroid nodule measuring 19 x 27 mm.   A 2nd patient. She remained stable throughout the entirety of the ED visit while under my care and developed no new or worsening symptoms. She was transported to the floor in stable condition and will follow-up with all providers and continue her outpatient medication plan as directed. PROCEDURES:  None    CONSULTS:  IP CONSULT TO INTERNAL MEDICINE  IP CONSULT TO CASE MANAGEMENT    CRITICAL CARE:  None    FINAL IMPRESSION      1.  Altered mental status, unspecified altered mental status type          DISPOSITION / PLAN     DISPOSITION      Admitted    PATIENT REFERRED TO:  Ld Horotn DO  [place] [address]  [address], [address] [facility]  [phone]            DISCHARGE MEDICATIONS:  Current Discharge Medication List          Ruben Parham DO    Emergency Medicine Resident    (Please note that portions of this note were completed with a voice recognition program.  Efforts were made to edit the dictations but occasionally words are mis-transcribed.)     Ruben Parham MD  06/28/18 Monetta Kanner

## 2018-06-29 ENCOUNTER — APPOINTMENT (OUTPATIENT)
Dept: GENERAL RADIOLOGY | Age: 63
DRG: 812 | End: 2018-06-29
Payer: MEDICARE

## 2018-06-29 PROBLEM — D72.829 LEUKOCYTOSIS: Status: ACTIVE | Noted: 2018-06-29

## 2018-06-29 PROBLEM — E72.20 HYPERAMMONEMIA (HCC): Status: ACTIVE | Noted: 2018-06-29

## 2018-06-29 PROBLEM — R89.7 ELEVATED MYOGLOBIN LEVEL: Status: ACTIVE | Noted: 2018-06-29

## 2018-06-29 PROBLEM — G93.41 ACUTE METABOLIC ENCEPHALOPATHY: Status: ACTIVE | Noted: 2018-06-29

## 2018-06-29 PROBLEM — D75.1 POLYCYTHEMIA SECONDARY TO SMOKING: Status: ACTIVE | Noted: 2018-06-29

## 2018-06-29 PROBLEM — E87.20 LACTIC ACIDOSIS: Status: ACTIVE | Noted: 2018-06-29

## 2018-06-29 PROBLEM — R74.8 ELEVATED CK: Status: ACTIVE | Noted: 2018-06-29

## 2018-06-29 PROBLEM — T42.4X1A BENZODIAZEPINE TOXICITY: Status: ACTIVE | Noted: 2018-06-29

## 2018-06-29 PROBLEM — E86.0 DEHYDRATION: Status: ACTIVE | Noted: 2018-06-29

## 2018-06-29 LAB
ABSOLUTE EOS #: 0.27 K/UL (ref 0–0.44)
ABSOLUTE IMMATURE GRANULOCYTE: <0.03 K/UL (ref 0–0.3)
ABSOLUTE LYMPH #: 2.05 K/UL (ref 1.1–3.7)
ABSOLUTE MONO #: 0.63 K/UL (ref 0.1–1.2)
ALBUMIN SERPL-MCNC: 3.6 G/DL (ref 3.5–5.2)
ALBUMIN/GLOBULIN RATIO: 1.2 (ref 1–2.5)
ALP BLD-CCNC: 68 U/L (ref 35–104)
ALT SERPL-CCNC: 28 U/L (ref 5–33)
ANION GAP SERPL CALCULATED.3IONS-SCNC: 11 MMOL/L (ref 9–17)
AST SERPL-CCNC: 61 U/L
BASOPHILS # BLD: 0 % (ref 0–2)
BASOPHILS ABSOLUTE: 0.03 K/UL (ref 0–0.2)
BILIRUB SERPL-MCNC: 0.51 MG/DL (ref 0.3–1.2)
BILIRUBIN DIRECT: <0.08 MG/DL
BILIRUBIN, INDIRECT: ABNORMAL MG/DL (ref 0–1)
BUN BLDV-MCNC: 11 MG/DL (ref 8–23)
BUN/CREAT BLD: ABNORMAL (ref 9–20)
CALCIUM SERPL-MCNC: 8.3 MG/DL (ref 8.6–10.4)
CHLORIDE BLD-SCNC: 110 MMOL/L (ref 98–107)
CO2: 22 MMOL/L (ref 20–31)
CREAT SERPL-MCNC: 0.53 MG/DL (ref 0.5–0.9)
DIFFERENTIAL TYPE: NORMAL
EOSINOPHILS RELATIVE PERCENT: 3 % (ref 1–4)
GFR AFRICAN AMERICAN: >60 ML/MIN
GFR NON-AFRICAN AMERICAN: >60 ML/MIN
GFR SERPL CREATININE-BSD FRML MDRD: ABNORMAL ML/MIN/{1.73_M2}
GFR SERPL CREATININE-BSD FRML MDRD: ABNORMAL ML/MIN/{1.73_M2}
GLOBULIN: ABNORMAL G/DL (ref 1.5–3.8)
GLUCOSE BLD-MCNC: 82 MG/DL (ref 70–99)
HCT VFR BLD CALC: 45.6 % (ref 36.3–47.1)
HEMOGLOBIN: 15 G/DL (ref 11.9–15.1)
IMMATURE GRANULOCYTES: 0 %
INR BLD: 1
LACTIC ACID, WHOLE BLOOD: 0.9 MMOL/L (ref 0.7–2.1)
LYMPHOCYTES # BLD: 26 % (ref 24–43)
MAGNESIUM: 2.3 MG/DL (ref 1.6–2.6)
MCH RBC QN AUTO: 31.2 PG (ref 25.2–33.5)
MCHC RBC AUTO-ENTMCNC: 32.9 G/DL (ref 28.4–34.8)
MCV RBC AUTO: 94.8 FL (ref 82.6–102.9)
MONOCYTES # BLD: 8 % (ref 3–12)
NRBC AUTOMATED: 0 PER 100 WBC
PARTIAL THROMBOPLASTIN TIME: 25 SEC (ref 20.5–30.5)
PDW BLD-RTO: 13.6 % (ref 11.8–14.4)
PLATELET # BLD: 266 K/UL (ref 138–453)
PLATELET ESTIMATE: NORMAL
PMV BLD AUTO: 11.3 FL (ref 8.1–13.5)
POTASSIUM SERPL-SCNC: 5.3 MMOL/L (ref 3.7–5.3)
PROTHROMBIN TIME: 10.7 SEC (ref 9–12)
RBC # BLD: 4.81 M/UL (ref 3.95–5.11)
RBC # BLD: NORMAL 10*6/UL
SEG NEUTROPHILS: 62 % (ref 36–65)
SEGMENTED NEUTROPHILS ABSOLUTE COUNT: 4.97 K/UL (ref 1.5–8.1)
SODIUM BLD-SCNC: 143 MMOL/L (ref 135–144)
T3 FREE: 2.99 PG/ML (ref 2.02–4.43)
THYROXINE, FREE: 0.82 NG/DL (ref 0.93–1.7)
TOTAL PROTEIN: 6.7 G/DL (ref 6.4–8.3)
TROPONIN INTERP: NORMAL
TROPONIN INTERP: NORMAL
TROPONIN T: <0.03 NG/ML
TROPONIN T: <0.03 NG/ML
TSH SERPL DL<=0.05 MIU/L-ACNC: 0.75 MIU/L (ref 0.3–5)
WBC # BLD: 8 K/UL (ref 3.5–11.3)
WBC # BLD: NORMAL 10*3/UL

## 2018-06-29 PROCEDURE — 84481 FREE ASSAY (FT-3): CPT

## 2018-06-29 PROCEDURE — 85025 COMPLETE CBC W/AUTO DIFF WBC: CPT

## 2018-06-29 PROCEDURE — G0480 DRUG TEST DEF 1-7 CLASSES: HCPCS

## 2018-06-29 PROCEDURE — 80048 BASIC METABOLIC PNL TOTAL CA: CPT

## 2018-06-29 PROCEDURE — 84439 ASSAY OF FREE THYROXINE: CPT

## 2018-06-29 PROCEDURE — 73502 X-RAY EXAM HIP UNI 2-3 VIEWS: CPT

## 2018-06-29 PROCEDURE — 97166 OT EVAL MOD COMPLEX 45 MIN: CPT

## 2018-06-29 PROCEDURE — 6370000000 HC RX 637 (ALT 250 FOR IP): Performed by: HOSPITALIST

## 2018-06-29 PROCEDURE — 99223 1ST HOSP IP/OBS HIGH 75: CPT | Performed by: INTERNAL MEDICINE

## 2018-06-29 PROCEDURE — G8979 MOBILITY GOAL STATUS: HCPCS

## 2018-06-29 PROCEDURE — 99406 BEHAV CHNG SMOKING 3-10 MIN: CPT

## 2018-06-29 PROCEDURE — 84484 ASSAY OF TROPONIN QUANT: CPT

## 2018-06-29 PROCEDURE — 85730 THROMBOPLASTIN TIME PARTIAL: CPT

## 2018-06-29 PROCEDURE — G8988 SELF CARE GOAL STATUS: HCPCS

## 2018-06-29 PROCEDURE — 80076 HEPATIC FUNCTION PANEL: CPT

## 2018-06-29 PROCEDURE — G8978 MOBILITY CURRENT STATUS: HCPCS

## 2018-06-29 PROCEDURE — 6360000002 HC RX W HCPCS: Performed by: HOSPITALIST

## 2018-06-29 PROCEDURE — 84443 ASSAY THYROID STIM HORMONE: CPT

## 2018-06-29 PROCEDURE — 83605 ASSAY OF LACTIC ACID: CPT

## 2018-06-29 PROCEDURE — G8987 SELF CARE CURRENT STATUS: HCPCS

## 2018-06-29 PROCEDURE — 83735 ASSAY OF MAGNESIUM: CPT

## 2018-06-29 PROCEDURE — 97161 PT EVAL LOW COMPLEX 20 MIN: CPT

## 2018-06-29 PROCEDURE — 97535 SELF CARE MNGMENT TRAINING: CPT

## 2018-06-29 PROCEDURE — 2580000003 HC RX 258: Performed by: HOSPITALIST

## 2018-06-29 PROCEDURE — 76937 US GUIDE VASCULAR ACCESS: CPT

## 2018-06-29 PROCEDURE — 36415 COLL VENOUS BLD VENIPUNCTURE: CPT

## 2018-06-29 PROCEDURE — 99254 IP/OBS CNSLTJ NEW/EST MOD 60: CPT | Performed by: PSYCHIATRY & NEUROLOGY

## 2018-06-29 PROCEDURE — 85610 PROTHROMBIN TIME: CPT

## 2018-06-29 PROCEDURE — 1200000000 HC SEMI PRIVATE

## 2018-06-29 PROCEDURE — 90792 PSYCH DIAG EVAL W/MED SRVCS: CPT | Performed by: NURSE PRACTITIONER

## 2018-06-29 PROCEDURE — 97530 THERAPEUTIC ACTIVITIES: CPT

## 2018-06-29 RX ORDER — ROPINIROLE 1 MG/1
1 TABLET, FILM COATED ORAL 4 TIMES DAILY
Status: DISCONTINUED | OUTPATIENT
Start: 2018-06-29 | End: 2018-06-30 | Stop reason: HOSPADM

## 2018-06-29 RX ORDER — KETOROLAC TROMETHAMINE 30 MG/ML
15 INJECTION, SOLUTION INTRAMUSCULAR; INTRAVENOUS EVERY 6 HOURS PRN
Status: DISCONTINUED | OUTPATIENT
Start: 2018-06-29 | End: 2018-06-30 | Stop reason: HOSPADM

## 2018-06-29 RX ADMIN — FAMOTIDINE 20 MG: 20 TABLET, FILM COATED ORAL at 09:25

## 2018-06-29 RX ADMIN — ROPINIROLE HYDROCHLORIDE 1 MG: 1 TABLET, FILM COATED ORAL at 18:34

## 2018-06-29 RX ADMIN — ENOXAPARIN SODIUM 40 MG: 40 INJECTION SUBCUTANEOUS at 18:34

## 2018-06-29 RX ADMIN — KETOROLAC TROMETHAMINE 15 MG: 30 INJECTION, SOLUTION INTRAMUSCULAR at 00:39

## 2018-06-29 RX ADMIN — LACTULOSE 20 G: 20 SOLUTION ORAL at 09:25

## 2018-06-29 RX ADMIN — ROPINIROLE HYDROCHLORIDE 1 MG: 1 TABLET, FILM COATED ORAL at 13:22

## 2018-06-29 RX ADMIN — Medication 10 ML: at 09:25

## 2018-06-29 RX ADMIN — ONDANSETRON 4 MG: 2 INJECTION, SOLUTION INTRAMUSCULAR; INTRAVENOUS at 00:25

## 2018-06-29 ASSESSMENT — PAIN SCALES - GENERAL
PAINLEVEL_OUTOF10: 10
PAINLEVEL_OUTOF10: 0

## 2018-06-29 NOTE — CONSULTS
NEUROLOGY INPATIENT PROGRESS NOTE    6/29/2018         Subjective: Hema Ferrell is a  66 y.o. female admitted on 6/28/2018 with Altered mental status [R41.82]  Altered mental status [R41.82]    Briefly, this is a  66 y.o. right handed  female with hx of Depression and restless leg syndrome was admitted on 6/28/2018 with altered mentation. As per the medical record; patient did not show up for work yesterday; patient's employer called family; they  attempted to get hold of patient and they were unable. Family called EMS. EMS arrived the patient's house and found her to have altered mentation with multiple bruises to her face, arms and hips \"looking like patient had fallen\". Patient stated that she was taking Klonopin and tramadol for her restless leg symptoms. She admits to depression but denied any attempts to harm herself. Her sister at bedside stated that patient had taken 6 additional tablets of Clonazepam on Wednesday night and she has been suffering from RLS for :\"long time\". Patient usually takes one tab of clonazepam nightly for restless legs syndrome. But on Wednesday night; she took 6 tab of Clonazepam. Patient also takes Requip 4mg per day and Lyrical 300mg per day. Patient has family hx of restless leg syndrome (in her sister and mom)       No current facility-administered medications on file prior to encounter. Current Outpatient Prescriptions on File Prior to Encounter   Medication Sig Dispense Refill    rOPINIRole (REQUIP) 1 MG tablet Take 1 mg by mouth 4 times daily      pregabalin (LYRICA) 150 MG capsule Take 150 mg by mouth 2 times daily. Beto Schultz clonazePAM (KLONOPIN) 1 MG tablet Take 1 mg by mouth daily. Lostephania Davidsonier mirtazapine (REMERON) 30 MG tablet Take 1 tablet by mouth nightly 30 tablet 5       Allergies: Hema Ferrell is allergic to pcn [penicillins] and neomycin.     Past Medical History:   Diagnosis Date    Depression     Frequent UTI     Infertility, female    Arunholden Enriquez Osteoarthritis     Restless leg syndrome        Past Surgical History:   Procedure Laterality Date    CATARACT REMOVAL  2007    right    DILATION AND CURETTAGE  1994    EYE SURGERY      \" as a child\"    TONSILLECTOMY AND ADENOIDECTOMY             Medications:     rOPINIRole  1 mg Oral 4x Daily    sodium chloride flush  10 mL Intravenous 2 times per day    sodium chloride flush  10 mL Intravenous 2 times per day    enoxaparin  40 mg Subcutaneous Daily    famotidine  20 mg Oral BID     PRN Meds include: ketorolac, sodium chloride flush, sodium chloride flush, magnesium hydroxide, ondansetron, potassium chloride **OR** potassium chloride **OR** potassium chloride, magnesium sulfate    Objective:   /74   Pulse 91   Temp 97.5 °F (36.4 °C) (Oral)   Resp 14   Ht 5' 5\" (1.651 m)   Wt 204 lb 4.8 oz (92.7 kg)   SpO2 94%   BMI 34.00 kg/m²     Blood pressure range: Systolic (19DWO), JNP:777 , Min:92 , YCY:024   ; Diastolic (85UXQ), ETW:61, Min:44, Max:74        NEUROLOGIC EXAMINATION  GENERAL  Appears comfortable and in no distress   HEENT  NC/ AT   cardiovascular  S1 and S2 heard; palpation of pulses: radial pulse    NECK  Supple and no bruits heard   MENTAL STATUS:  Alert, oriented x3, difficulty with immediate recall and trouble with serial subtractions; intact long term memory, no confusion, normal speech, normal language, no hallucination or delusion   CRANIAL NERVES: II     -      PERRLA, optic discs; clear; Visual fields intact to confrontation  III,IV,VI -  EOMs full, no afferent defect, no LEONARD, no ptosis  V     -     Normal facial sensation   VII    -     Normal facial symmetry  VIII   -     Intact hearing   IX,X -     Symmetrical palate  XI    -     Symmetrical shoulder shrug  XII   -     Midline tongue, no atrophy    MOTOR FUNCTION:  significant for good strength of grade 5/5 in bilateral proximal and distal muscle groups of both upper and lower extremities with normal bulk, normal tone and no

## 2018-06-29 NOTE — CARE COORDINATION
Case Management Initial Discharge Plan  Elder Yeh Risk              Risk of Unplanned Readmission:        8               Met with:patient and sister to discuss discharge plans.    Information verified: address, contacts, phone number, , insurance Yes  PCP: Ilean Apley, DO  Date of last visit: past year    Insurance Provider: Daniella    Discharge Planning    Living Arrangements:  Alone   Support Systems:  Family Members    Home has 1 stories  0 stairs to climb to get into front door, 0stairs to climb to reach second floor  Location of bedroom/bathroom in home ne[place]    Patient able to perform ADL's:Independent    Current Services (outpatient & in home)   DME equipment: none  DME provider:  n/a    Pharmacy: [facility]   Potential Assistance Purchasing Medications:  No  Does patient want to participate in local refill/ meds to beds program?  No    Potential Assistance Needed:  N/A    Patient agreeable to home care: No  Dennis of choice provided:  n/a    Prior SNF/Rehab Placement and Facility:   Agreeable to SNF/Rehab: No  Dennis of choice provided: n/a   Evaluation: no    Expected Discharge date:  18  Patient expects to be discharged to:  Home  Follow Up Appointment: Best Day/ Time:     Transportation provider: self/sister  Transportation arrangements needed for discharge: Yes    Discharge Plan: home with sister has walker at home        Electronically signed by Krishnamurthy RN on 18 at 11:57 AM

## 2018-06-29 NOTE — PROGRESS NOTES
Occupational Therapy   Occupational Therapy Initial Assessment  Date: 2018   Patient Name: Radha Sanabria  MRN: [id number]     : 1955    Date of Service: 2018    Discharge Recommendations:  24 hour supervision or assist     Patient Diagnosis(es): The encounter diagnosis was Altered mental status, unspecified altered mental status type. Copied from emergency medicine: Radha Sanabria is a 66 y.o. female who presents with Altered mental status. Patient did not show up to work today and EMS was called by pt's brother after she did not respond, subsequently EMS found patient altered. Patient states she did take Klonopin and tramadol last night. Denies self-harm attempt. She states no injury or trauma. Denies alcohol or illicit drug use.    has a past medical history of Depression; Frequent UTI; Infertility, female; Osteoarthritis; and Restless leg syndrome. has a past surgical history that includes Dilation & curettage (); Tonsillectomy and adenoidectomy; Eye surgery; and Cataract removal (). Treatment Diagnosis: altered mental status       Restrictions  Restrictions/Precautions  Restrictions/Precautions: Fall Risk  Required Braces or Orthoses?: No  Position Activity Restriction  Other position/activity restrictions: up with assist     Subjective   General  Chart Reviewed: No  Patient assessed for rehabilitation services?: Yes  Family / Caregiver Present: Yes (pt sister present for duration of session)  Diagnosis: altered mental status   General Comment  Comments: RN ok'd for therapy this morning.  Pt agreeable to participate in session and  cooperative throughout   Pain Assessment  Patient Currently in Pain: Denies    Oxygen Therapy  O2 Device: Nasal cannula  O2 Flow Rate (L/min): 2 L/min     Social/Functional History  Social/Functional History  Lives With: Alone  Type of Home: House  Home Layout: One level  Home Access: Stairs to enter without rails  Entrance Stairs - Number of by assistance    RUE Tone: Normotonic  LUE Tone: Normotonic    Coordination  Movements Are Fluid And Coordinated: Yes     Bed mobility  Bridging: Modified independent   Supine to Sit: Modified independent  Sit to Supine: Modified independent  Scooting: Modified independent  Transfers  Sit to stand: Stand by assistance  Stand to sit: Stand by assistance  Transfer Comments: with use of RW      Cognition  Overall Cognitive Status: Exceptions  Following Commands: Follows one step commands consistently  Attention Span: Attends with cues to redirect  Memory: Decreased recall of recent events  Safety Judgement: Decreased awareness of need for assistance  Initiation: Requires cues for some     Sensation  Overall Sensation Status: WFL      LUE AROM : WFL  Left Hand AROM: WFL  RUE AROM : WFL  Right Hand AROM: WFL    LUE Strength  Gross LUE Strength: Exceptions to [facility] [employer]  L Hand Grasp: 4/5  LUE Strength Comment: Overall muscle strength 4/5   RUE Strength  Gross RUE Strength: Exceptions to [place] [facility] [employer]  R Hand Grasp: 4/5  RUE Strength Comment: overall muscle strength 4/5     Assessment   Performance deficits / Impairments: Decreased functional mobility ; Decreased safe awareness;Decreased high-level IADLs;Decreased ADL status  Treatment Diagnosis: altered mental status   Prognosis: Good  Decision Making: Medium Complexity  Patient Education: pt ed on POC, importance of getting out of bed, benefits of continued therapy, safety within the home environment and safety during functional transfers/functional mobility. Good return   REQUIRES OT FOLLOW UP: Yes  Activity Tolerance  Activity Tolerance: Patient Tolerated treatment well  Safety Devices  Safety Devices in place: Yes  Type of devices: Bed alarm in place;Call light within reach; Left in bed;Patient at risk for falls;Gait belt (telesitter present at end of session)  Restraints  Initially in place: No         Plan   Plan  Times per week: 3-4x a week     G-Code  OT G-codes  Functional Assessment Tool Used: [facility]  Score: 21/24   Functional Limitation: Self care  Self Care Current Status (): At least 20 percent but less than 40 percent impaired, limited or restricted  Self Care Goal Status (): At least 1 percent but less than 20 percent impaired, limited or restricted    AM-PAC Score      How much help from another person does the pt currently need? Unable A Lot A Little None   1. Putting on and taking off regular lower body clothing? 1      2      3       4   2. Bathing (including washing, rinsing, drying)? 1      2      3      4   3. Toileting, which includes using toilet, bedpan, or urinal?      1      2        3      4   4. Putting on and taking off regular upper body clothing? 1      2      3       4   5. Taking care of personal grooming such as brushing teeth? 1      2      3      4   6. Eating meals? 1      2       3       4     1. Unable = Total/Dependent Assist  2. A Lot = Maximum/Moderate Assist  3. A Little = Minimum/Contact Guard Assist/Supervision  4.  None= Modified Brule/Independent    Raw Score Scale Score Scale Score Standard Error Approximate Degree of Functional Impairment     6 17.07 3.74 100%   7 20.13 3.68 92%   8 22.86 3.43 86%   9 25.33 3.17 80%   10 27.31 2.96 75%   11 29.04 2.79 70%   12 30.60 2.68 67%   13 32.03 2.62 63%   14 33.39 2.61 60%   15 34.69 2.65 56%   16 35.96 2.71 53%   17 37.26 2.82 50%   18 38.66 2.97 47%   19 40.22 3.20 43%   20 42.03 3.55 38%   21 44.27 4.08 33%   22 47.10 4.81 26%   23 51.12 5.88 16%   24 57.54 7.36 0%     Goals  Short term goals  Time Frame for Short term goals: pt will, by discharge  Short term goal 1: dem AxO x4 for 2-3 consecutive session  Short term goal 2: complete LB ADLs with mod I, set up and no verbal cues for redirection   Short term goal 3: dem mod I during functional transfers/functional mobility with LRD  Short term goal 4: ind dem safety awareness during functional

## 2018-06-29 NOTE — PROGRESS NOTES
Patient out of bed and fell. Patient back into bed and resting. Vital signs are within normal limits. Doctor notified. Will continue to monitor.

## 2018-06-29 NOTE — CARE COORDINATION
Patient is currently living alone, sister stares she can stay with her after discharge.   Patient states that she has 2 walkers and a can at home

## 2018-06-29 NOTE — PROGRESS NOTES
Physical Therapy    Facility/Department: [facility]  Initial Assessment    NAME: Claudeen Kallman  : 1955  MRN: [id number]    Date of Service: 2018  Chief Complaint   Patient presents with    Altered Mental Status     Copied from internal medicine report 18  The patient is a pleasant 66 y.o. female with significant medical history of restless leg syndrome, osteoarthritis, depression, insomnia, and frequent UTIs presented to [facility] emergency Department with altered mental status via EMS. It is reported that patient did not show up for work today, patient's employer called family who attempted to get a hold of patient and were unable to. Family called EMS. EMS arrived the patient's house and found her to have altered mental status, multiple bruises to the face arms and hips, looking like the patient had fallen at home. Patient states she takes Klonopin and tramadol the night before. Patient denies any self-harm attempt. Patient denied any trauma. Patient denies any drug or alcohol use. Patient is difficult to arouse and sleepy. HPI is limited secondary to decreased patient interaction. Patient does complain of some myalgias. Patient denies any fever, nausea, vomiting, or chills. Discharge Recommendations:  24 hour supervision or assist (Pt's Sister reports she can stay with her)  PT Equipment Recommendations  Equipment Needed: No (sister has DME (RW) that is available to use)    Patient Diagnosis(es): The encounter diagnosis was Altered mental status, unspecified altered mental status type. has a past medical history of Depression; Frequent UTI; Infertility, female; Osteoarthritis; and Restless leg syndrome. has a past surgical history that includes Dilation & curettage (); Tonsillectomy and adenoidectomy; Eye surgery; and Cataract removal ().     Restrictions  Restrictions/Precautions  Restrictions/Precautions: Fall Risk  Required Braces or Orthoses?: No  Position Activity Restriction  Other position/activity restrictions: up with assist   Vision/Hearing  Vision: Within Functional Limits  Hearing: Within functional limits     Subjective  General  Patient assessed for rehabilitation services?: Yes  Family / Caregiver Present: Yes (sister)  Follows Commands: Within Functional Limits  General Comment  Comments: Pt found supine in bed at arrival and returned to supine at exit of writer. Subjective  Subjective: Pt and RN agreeable to PT. Pt cooperative throughout session but slightly confused. Pt's sister reports that pt is oriented at baseline and is very confused this date. Pain Screening  Patient Currently in Pain: Denies  Vital Signs  Patient Currently in Pain: No       Orientation  Orientation  Overall Orientation Status: Impaired  Orientation Level: Oriented to place;Oriented to person;Disoriented to time;Disoriented to situation    Social/Functional History  Social/Functional History  Lives With: Alone  Type of Home: House  Home Layout: One level  Home Access: Stairs to enter without rails  Entrance Stairs - Number of Steps: 1 step   Bathroom Shower/Tub: Tub/Shower unit  Bathroom Toilet: Standard  Home Equipment: Standard walker, Cane (pt sister reports having DME. 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  Pt sister reported being able to provide 24 hr supervision/assist if needed upon d/c   Objective Good;-  Standing - Dynamic: Fair;+ (Mild unsteadiness noted with ambulation, no LOB but required heavily on support of RW for balance.)        Assessment   Body structures, Functions, Activity limitations: Decreased functional mobility ; Decreased strength;Decreased cognition;Decreased balance;Decreased endurance  Assessment: Pt amb 100 ft CGA with RW with slow, small steps and mild unsteadiness. Pt limited by balance and cognition this date. Recommend skilled PT while in hospital to address mentioned deficits and D/C home with 24 hr assist (provided by sister). Prognosis: Good  Decision Making: Medium Complexity  Patient Education: POC and importance of movement  REQUIRES PT FOLLOW UP: Yes  Activity Tolerance  Activity Tolerance: Patient Tolerated treatment well;Patient limited by fatigue;Patient limited by cognitive status         Plan   Plan  Times per week: 5-6x/wk  Times per day: Daily  Current Treatment Recommendations: Strengthening, Balance Training, Functional Mobility Training, Endurance Training, Transfer Training, Gait Training, Stair training  Safety Devices  Type of devices: All fall risk precautions in place, Bed alarm in place, Call light within reach, Gait belt, Patient at risk for falls, Left in bed, Telesitter in use, Nurse notified  Restraints  Initially in place: No    G-Code  PT G-Codes  Functional Assessment Tool Used: [facility]  Score: 21/24  Functional Limitation: Mobility: Walking and moving around  Mobility: Walking and Moving Around Current Status ():  At least 20 percent but less than 40 percent impaired, limited or restricted  Mobility: Walking and Moving Around Goal Status (): 0 percent impaired, limited or restricted  OutComes Score                                           AM-PAC Score  AM-PAC Inpatient Mobility Raw Score : 21  AM-PAC Inpatient T-Scale Score : 50.25  Mobility Inpatient CMS 0-100% Score: 28.97  Mobility Inpatient CMS G-Code Modifier : CJ Goals  Short term goals  Time Frame for Short term goals: 14 visits  Short term goal 1: Pt will be Ind with STS transfers  Short term goal 2: Pt will amb 300 ft supervision to demo functional mobility  Short term goal 3: Pt will ascend/descend 3 stairs to demo functional mobility  Short term goal 4: Pt will increase hip flexion MMt to WNL or 4+/5        Therapy Time   Individual Concurrent Group Co-treatment   Time In 0847         Time Out 0909         Minutes 22                 Earline Saunders, CEFERINO  Evaluation/treatment performed by Student PT under the supervision of co-signing PT who agrees with all evaluation/treatment and documentation.

## 2018-06-29 NOTE — PROGRESS NOTES
[facility]     Department of Internal Medicine - Staff Internal Medicine Service     DAILY PROGRESS NOTE - TEAM       Patient: Michelle Dumont  YOB: 1955  MRN: [id number]     Acct: [de-identified]     Admit date: 6/28/2018  Admitting Diagnosis: Acute metabolic encephalopathy    Subjective:   Pt seen and Chart reviewed. VS stable, Afebrile overnight  Pt feeling hungry and thirsty and wanting to eat this am  Pt is much more alert, has no recollection of events yesterday. Does not remember speaking with medical staff yesterday. Does not know how she got to hospital.   Pt does not know if she took too many Clonopin pills as she cannot remember. No bm overnight  Pt has pulled out her IV  Nurse reports she is still somewhat impulsive. No intake or output data in the 24 hours ending 06/29/18 0617      REVIEW OF SYSTEMS:  · Constitutional: Negative for Fever, chills  · Eyes: Negative for visual changes, diplopia  · ENT: Negative for mouth sores, sore throat. · Cardiovascular: Negative for lightheadedness ,chest pain, palpitations   · Respiratory: Negative for Shortness of breath,cough or wheezing. · Gastrointestinal: Negative for nausea/vomiting, change in bowel habits, abdominal pain  · Genitourinary: Negative for change in bladder habits, dysuria, hematuria. · Musculoskeletal: Negative for joint pain, c/o myalgias and back pain which is chronic. · Neurological: Negative for headache, change in muscle strength numbness/tingling  · Psychiatric: Tearful mood, expressive facial reactions, appears to have adequate judgement. Objective:   BP (!) 102/44   Pulse 92   Temp 98.1 °F (36.7 °C) (Oral)   Resp 16   Ht 5' 5\" (1.651 m)   Wt 204 lb 4.8 oz (92.7 kg)   SpO2 92%   BMI 34.00 kg/m²     No intake or output data in the 24 hours ending 06/29/18 0617    · General appearance: awake, alert, cooperative, somewhat disheveled. Multiple bruises to face, arms, and hips.

## 2018-06-30 VITALS
HEIGHT: 65 IN | TEMPERATURE: 98.1 F | SYSTOLIC BLOOD PRESSURE: 109 MMHG | HEART RATE: 85 BPM | OXYGEN SATURATION: 92 % | BODY MASS INDEX: 33.98 KG/M2 | DIASTOLIC BLOOD PRESSURE: 61 MMHG | RESPIRATION RATE: 14 BRPM | WEIGHT: 203.93 LBS

## 2018-06-30 LAB
ABSOLUTE EOS #: 0.23 K/UL (ref 0–0.44)
ABSOLUTE IMMATURE GRANULOCYTE: <0.03 K/UL (ref 0–0.3)
ABSOLUTE LYMPH #: 2.11 K/UL (ref 1.1–3.7)
ABSOLUTE MONO #: 0.58 K/UL (ref 0.1–1.2)
ANION GAP SERPL CALCULATED.3IONS-SCNC: 10 MMOL/L (ref 9–17)
BASOPHILS # BLD: 1 % (ref 0–2)
BASOPHILS ABSOLUTE: 0.04 K/UL (ref 0–0.2)
BUN BLDV-MCNC: 11 MG/DL (ref 8–23)
BUN/CREAT BLD: ABNORMAL (ref 9–20)
CALCIUM SERPL-MCNC: 8.9 MG/DL (ref 8.6–10.4)
CHLORIDE BLD-SCNC: 109 MMOL/L (ref 98–107)
CO2: 22 MMOL/L (ref 20–31)
CREAT SERPL-MCNC: 0.53 MG/DL (ref 0.5–0.9)
DIFFERENTIAL TYPE: NORMAL
EOSINOPHILS RELATIVE PERCENT: 3 % (ref 1–4)
FERRITIN: 1334 UG/L (ref 13–150)
FOLATE: >20 NG/ML
GFR AFRICAN AMERICAN: >60 ML/MIN
GFR NON-AFRICAN AMERICAN: >60 ML/MIN
GFR SERPL CREATININE-BSD FRML MDRD: ABNORMAL ML/MIN/{1.73_M2}
GFR SERPL CREATININE-BSD FRML MDRD: ABNORMAL ML/MIN/{1.73_M2}
GLUCOSE BLD-MCNC: 89 MG/DL (ref 70–99)
HCT VFR BLD CALC: 45.5 % (ref 36.3–47.1)
HEMOGLOBIN: 14.6 G/DL (ref 11.9–15.1)
IMMATURE GRANULOCYTES: 0 %
LYMPHOCYTES # BLD: 28 % (ref 24–43)
MCH RBC QN AUTO: 31.1 PG (ref 25.2–33.5)
MCHC RBC AUTO-ENTMCNC: 32.1 G/DL (ref 28.4–34.8)
MCV RBC AUTO: 96.8 FL (ref 82.6–102.9)
MONOCYTES # BLD: 8 % (ref 3–12)
NRBC AUTOMATED: 0 PER 100 WBC
PDW BLD-RTO: 13.1 % (ref 11.8–14.4)
PLATELET # BLD: 205 K/UL (ref 138–453)
PLATELET ESTIMATE: NORMAL
PMV BLD AUTO: 10.5 FL (ref 8.1–13.5)
POTASSIUM SERPL-SCNC: 4.1 MMOL/L (ref 3.7–5.3)
RBC # BLD: 4.7 M/UL (ref 3.95–5.11)
RBC # BLD: NORMAL 10*6/UL
SEG NEUTROPHILS: 60 % (ref 36–65)
SEGMENTED NEUTROPHILS ABSOLUTE COUNT: 4.48 K/UL (ref 1.5–8.1)
SODIUM BLD-SCNC: 141 MMOL/L (ref 135–144)
VITAMIN B-12: 777 PG/ML (ref 232–1245)
WBC # BLD: 7.5 K/UL (ref 3.5–11.3)
WBC # BLD: NORMAL 10*3/UL

## 2018-06-30 PROCEDURE — 2580000003 HC RX 258: Performed by: HOSPITALIST

## 2018-06-30 PROCEDURE — 80048 BASIC METABOLIC PNL TOTAL CA: CPT

## 2018-06-30 PROCEDURE — 6370000000 HC RX 637 (ALT 250 FOR IP): Performed by: HOSPITALIST

## 2018-06-30 PROCEDURE — 6370000000 HC RX 637 (ALT 250 FOR IP): Performed by: STUDENT IN AN ORGANIZED HEALTH CARE EDUCATION/TRAINING PROGRAM

## 2018-06-30 PROCEDURE — 94762 N-INVAS EAR/PLS OXIMTRY CONT: CPT

## 2018-06-30 PROCEDURE — 36415 COLL VENOUS BLD VENIPUNCTURE: CPT

## 2018-06-30 PROCEDURE — 82728 ASSAY OF FERRITIN: CPT

## 2018-06-30 PROCEDURE — 82607 VITAMIN B-12: CPT

## 2018-06-30 PROCEDURE — 82746 ASSAY OF FOLIC ACID SERUM: CPT

## 2018-06-30 PROCEDURE — 99233 SBSQ HOSP IP/OBS HIGH 50: CPT | Performed by: NURSE PRACTITIONER

## 2018-06-30 PROCEDURE — 99238 HOSP IP/OBS DSCHRG MGMT 30/<: CPT | Performed by: INTERNAL MEDICINE

## 2018-06-30 PROCEDURE — 85025 COMPLETE CBC W/AUTO DIFF WBC: CPT

## 2018-06-30 RX ORDER — QUETIAPINE FUMARATE 25 MG/1
12.5 TABLET, FILM COATED ORAL ONCE
Status: COMPLETED | OUTPATIENT
Start: 2018-06-30 | End: 2018-06-30

## 2018-06-30 RX ADMIN — FAMOTIDINE 20 MG: 20 TABLET, FILM COATED ORAL at 01:18

## 2018-06-30 RX ADMIN — ROPINIROLE HYDROCHLORIDE 1 MG: 1 TABLET, FILM COATED ORAL at 09:50

## 2018-06-30 RX ADMIN — FAMOTIDINE 20 MG: 20 TABLET, FILM COATED ORAL at 09:50

## 2018-06-30 RX ADMIN — ROPINIROLE HYDROCHLORIDE 1 MG: 1 TABLET, FILM COATED ORAL at 01:18

## 2018-06-30 RX ADMIN — Medication 10 ML: at 01:19

## 2018-06-30 RX ADMIN — QUETIAPINE FUMARATE 12.5 MG: 25 TABLET ORAL at 05:55

## 2018-06-30 NOTE — PROGRESS NOTES
the cervical spine. Degenerative disc disease at C5-6. Multilevel predominantly left-sided facet arthropathy. Large calcified right thyroid nodule at 27 mm. Additional partially calcified nodules bilaterally. Consider nonemergent ultrasound follow-up. Xr Chest Portable    Result Date: 6/28/2018  EXAMINATION: SINGLE XRAY VIEW OF THE CHEST 6/28/2018 2:02 pm COMPARISON: None. HISTORY: ORDERING SYSTEM PROVIDED HISTORY: Altered mental status TECHNOLOGIST PROVIDED HISTORY: Reason for exam:->Altered mental status FINDINGS: Cardiac silhouette is borderline prominent and there is generalized interstitial prominence with central vascular congestion. No obvious pleural effusion or pneumothorax. Osseous structures and soft tissues are grossly intact and stable. Borderline cardiomegaly. Generalized interstitial prominence and bronchial wall thickening, suggestive of bronchiolitis. Right basilar atelectasis/scarring. Conventional PA and lateral radiographs of the chest should be considered when clinically visible. Assessment and Plan:   Principal Problem:    Acute metabolic encephalopathy  Active Problems:    Altered mental status    Thyroid nodule, Rt. Lobe. 27 mm    Elevated CK    Elevated myoglobin level    Lactic acidosis    Hyperammonemia (HCC)    Leukocytosis    Polycythemia secondary to smoking    Dehydration    Benzodiazepine toxicity    Benzodiazepine overdose of undetermined intent    Recurrent major depressive disorder, in partial remission (HCC)    Restless leg syndrome, nonfamilial, uncontrolled  Resolved Problems:    * No resolved hospital problems. *      1. Acute toxic metabolic encephalopathy  Mental status improved, patient is alert and oriented but upset about things going on in her life.  Denied any suicidal or homicidal ideation  Neurology thinks it is toxic metabolic encephalopathy, did not comment about possible seizure  Psychiatry - outpatient psychiatry referral as patient does not have any suicidal ideation  Klonopin level pending  Neurology and psychiatry ok to discharge    2. Lactic acidosis resolved    3. Calcified right lobe thyroid nodule  Recommend outpatient thyroid USG    4. DVT prophylaxis - lovenox    5.  GI prophylaxis - pepcid    6. Discharge home today       Janet Ward MD  PGY1, Internal Medicine Resident  [address] [facility]  6/30/2018, 12:04 PM     Attending Physician Statement  I have discussed the care of Nancy Braxton, including pertinent history and exam findings with the resident. I have reviewed the key elements of all parts of the encounter with the resident. I have seen and examined the patient with the resident and the key elements of all parts of the encounter have been performed by me.

## 2018-06-30 NOTE — PROGRESS NOTES
Was able to get pt. To take her seroquel. Pt. Was very upset and emotional, was saying, \"I will never forgive my sister for thowing me under the bus. 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 \"  States, \"I'm going to go home after the doctors come around to see me this morning. \"  I can rest at home. I don't feel I need to be here. Offered pt. Reassurance and support. Pt. Appeared to feel better after speaking with me.

## 2018-06-30 NOTE — PROGRESS NOTES
Neurology Nurse Practitioner Progress Note      INTERVAL HISTORY: This is a 66 y.o.  female admitted 6/28/2018 for AMS. This is a follow-up neurology progress note. The patient was examined and the chart was reviewed. Discussed with the pt & RN. Pt was very agitated last night, wanting to go back home due to the fear of loosing her job. Was given Seroquel 12.5 mg x 1. Sitter was placed at the bed side. HPI: Paula Harmon is a 66 y.o. female with H/O RLS, chronic insomnia, depression, who was admitted 6/28/2018 for AMS. According to the medical records & family, pt's employer called pt's neighbor as she didn't show up at her job place. Employer couldn't  Get in touch with the neighbor so she called the family. Pt's brother in law tried contacting her on the phone but was unsuccessful. Finally he went to her home & found her down with multiple bruises & cuts on her face & body. He tried waking her up but couldn't so he called the EMS who found her with AMS. She was brought in for evaluation. At arrival, pt was moaning, was sleepy & difficult to arouse; was partially oriented & wasn't able to follow commands. She was dehydrated. She recalled taking Tramadol & Klonopin the night prior; she reported H/O depression but denied being suicidal. Psych was consulted. She has long standing H/O RLS for which she has been taking Requip 1 mg 4 times a day, Lyrica 150 mg BID & Klonopin 1 mg QD. Urine tox was positive for benzo. Later, the sister informed that pt has had chronic insomnia due to RLS & she would take extra doses of her prescribed medications to be able to sleep properly. Overnight, pt fell out of t he bed when she tried to climb out of it; she kept pulling pulse Ox & telemetry leads repeatedly. Telesitter was placed in the room. Next morning pt had no recollection of last night events. Initially she couldn't remember how many Klonopin 1 mg pills she took. Later, she reported to take 5 tablets.  CT head - no acute pathology.  rOPINIRole  1 mg Oral 4x Daily    sodium chloride flush  10 mL Intravenous 2 times per day    sodium chloride flush  10 mL Intravenous 2 times per day    enoxaparin  40 mg Subcutaneous Daily    famotidine  20 mg Oral BID       Past Medical History:   Diagnosis Date    Depression     Frequent UTI     Infertility, female     Osteoarthritis     Restless leg syndrome        Past Surgical History:   Procedure Laterality Date    CATARACT REMOVAL  2007    right    DILATION AND CURETTAGE  1994    EYE SURGERY      \" as a child\"    TONSILLECTOMY AND ADENOIDECTOMY         PHYSICAL EXAM:      Blood pressure 109/61, pulse 85, temperature 98.1 °F (36.7 °C), temperature source Oral, resp. rate 14, height 5' 5\" (1.651 m), weight 203 lb 14.8 oz (92.5 kg), SpO2 92 %. Limited Neurological Examination:  Pt was very sleepy, wasn't able to keep her eyes open for a long time; she was least interested in engaging in any questions/answers or examination. She got verbally aggressive.   No ptosis was noted; face appeared symmetrical; hearing intact  Was moving all limbs equally & purposefully  Rest of the exam was deferred      DATA      Lab Results   Component Value Date    WBC 7.5 06/30/2018    HGB 14.6 06/30/2018    HCT 45.5 06/30/2018     06/30/2018    CHOL 141 03/21/2012    TRIG 73 03/21/2012    HDL 67 03/21/2012    ALT 28 06/29/2018    AST 61 (H) 06/29/2018     06/29/2018    K 5.3 06/29/2018     (H) 06/29/2018    CREATININE 0.53 06/29/2018    BUN 11 06/29/2018    CO2 22 06/29/2018    TSH 0.75 06/29/2018    INR 1.0 06/29/2018    LABA1C 4.7 03/21/2012     Urine Tox           Benzodiazepines    Clonazepam     Ammonia            63     6/28/2018  19:23   Myoglobin 200 (H)   Total CK 1,193 (H)       Ferritin          1334  Vit B12            777  Folate              >20        CT HEAD (6/28/2018): No acute intracranial abnormality    CT C-SPINE (6/28/2018): Degenerative disc disease

## 2018-06-30 NOTE — CONSULTS
4 mg, 4 mg, Intravenous, Q6H PRN  enoxaparin (LOVENOX) injection 40 mg, 40 mg, Subcutaneous, Daily  potassium chloride (KLOR-CON M) extended release tablet 40 mEq, 40 mEq, Oral, PRN **OR** potassium chloride 20 MEQ/15ML (10%) oral solution 40 mEq, 40 mEq, Oral, PRN **OR** potassium chloride 10 mEq/100 mL IVPB (Peripheral Line), 10 mEq, Intravenous, PRN  magnesium sulfate 1 g in dextrose 5% 100 mL IVPB, 1 g, Intravenous, PRN  famotidine (PEPCID) tablet 20 mg, 20 mg, Oral, BID       PAST PSYCHIATRIC HISTORY:  Patient denies any previous suicide attempts. She states she has never been hospitalized for psychiatric illness. States she has had episodes of depression in the past and recalls taking Zoloft previously. Past psychiatric medications include: Adverse reactions from psychotropic medications:  States Zoloft made her restless leg symptoms worsen    Lifetime Psychiatric Review of Systems:     Jacy: denies  Panic: denies  Phobia: denies  Hallucinations: denies  Delusions: denies     Past Medical History:        Diagnosis Date    Depression     Frequent UTI     Infertility, female     Osteoarthritis     Restless leg syndrome        Past Surgical History:        Procedure Laterality Date    CATARACT REMOVAL  2007    right   [address] [facility]      \" as a child\"    TONSILLECTOMY AND ADENOIDECTOMY         Allergies: Pcn [penicillins] and Neomycin      Social History:  Patient states she lives alone, her sister lives locally and is supportive. She has never been  and has no children. She completed high school and currently works as a home health aide. Family Psychiatric History:  Patient denies any family history of psychiatric illness or suicide.     Family History:   Family History   Problem Relation Age of Onset    Hypertension Mother     Osteoporosis Mother     Cancer Mother         pancreatic    Diabetes Paternal Grandmother     Lung Cancer Father    

## 2018-07-02 ENCOUNTER — OFFICE VISIT (OUTPATIENT)
Dept: INTERNAL MEDICINE CLINIC | Age: 63
End: 2018-07-02
Payer: MEDICARE

## 2018-07-02 VITALS
BODY MASS INDEX: 34.66 KG/M2 | TEMPERATURE: 97.7 F | SYSTOLIC BLOOD PRESSURE: 110 MMHG | WEIGHT: 208 LBS | OXYGEN SATURATION: 94 % | RESPIRATION RATE: 20 BRPM | HEART RATE: 90 BPM | HEIGHT: 65 IN | DIASTOLIC BLOOD PRESSURE: 72 MMHG

## 2018-07-02 DIAGNOSIS — E72.20 HYPERAMMONEMIA (HCC): ICD-10-CM

## 2018-07-02 DIAGNOSIS — R41.82 ALTERED MENTAL STATUS, UNSPECIFIED ALTERED MENTAL STATUS TYPE: Primary | ICD-10-CM

## 2018-07-02 PROCEDURE — 99495 TRANSJ CARE MGMT MOD F2F 14D: CPT | Performed by: NURSE PRACTITIONER

## 2018-07-02 PROCEDURE — 1111F DSCHRG MED/CURRENT MED MERGE: CPT | Performed by: NURSE PRACTITIONER

## 2018-07-02 NOTE — LETTER
[facility]  [address]   [place] [address]  Phone: [phone]  Fax: [phone]    DESMOND Gerber CNP        July 2, 2018     Patient: Morenita Thao   YOB: 1955   Date of Visit: 7/2/2018       To Whom It May Concern: It is my medical opinion that Morenita Thao may return to work on 7/4/2018 with no restrictions. If you have any questions or concerns, please don't hesitate to call.     Sincerely,          DESMOND Gerber CNP

## 2018-07-02 NOTE — PROGRESS NOTES
Chronic Disease Visit Information    BP Readings from Last 3 Encounters:   06/30/18 109/61   05/22/18 104/72   01/17/18 128/82          Hemoglobin A1C (%)   Date Value   03/21/2012 4.7     LDL Cholesterol (mg/dL)   Date Value   03/21/2012 59     HDL (mg/dL)   Date Value   03/21/2012 67     BUN (mg/dL)   Date Value   06/30/2018 11     CREATININE (mg/dL)   Date Value   06/30/2018 0.53     Glucose (mg/dL)   Date Value   06/30/2018 89   03/21/2012 90            Have you changed or started any medications since your last visit including any over-the-counter medicines, vitamins, or herbal medicines? no   Are you having any side effects from any of your medications? -  no  Have you stopped taking any of your medications? Is so, why? -  no    Have you seen any other physician or provider since your last visit? No  Have you had any other diagnostic tests since your last visit? No  Have you been seen in the emergency room and/or had an admission to a hospital since we last saw you? Yes - Records Requested  Have you had your annual diabetic retinal (eye) exam? No  Have you had your routine dental cleaning in the past 6 months? yes     Have you activated your [employer] account? If not, what are your barriers?  No     Patient Care Team:  True Miser, DO as PCP - General (Family Medicine)  True Miser, DO as PCP - [facility] Attributed Provider         Medical History Review  Past Medical, Family, and Social History reviewed and does not contribute to the patient presenting condition    Health Maintenance   Topic Date Due    Hepatitis C screen  1955    HIV screen  02/12/1972    DTaP/Tdap/Td vaccine (1 - Tdap) 02/12/1976    Pneumococcal med risk (1 of 1 - PPSV23) 02/12/1976    Cervical cancer screen  02/12/1978    Shingles Vaccine (1 of 2 - 2 Dose Series) 02/12/2007    Colon cancer screen colonoscopy  02/12/2007    Breast cancer screen  03/05/2014    Lipid screen  03/21/2017    Flu vaccine (1) 09/01/2018

## 2018-07-02 NOTE — LETTER
[facility]  [address] [place] [address] [place] [address]  Phone: [phone]  Fax: [phone]    DESMOND Law CNP        July 2, 2018     Patient: Ricardo El   YOB: 1955   Date of Visit: 7/2/2018       To Whom it May Concern: Ricardo El was seen in my clinic on 7/2/2018. She may return to work on 7/3/18. If you have any questions or concerns, please don't hesitate to call.     Sincerely,         DESMOND Law CNP

## 2018-07-04 LAB
CLONAZEPAM LEVEL: 41 NG/ML (ref 20–70)
CULTURE: NORMAL
CULTURE: NORMAL
Lab: NORMAL
Lab: NORMAL
SPECIMEN DESCRIPTION: NORMAL
SPECIMEN DESCRIPTION: NORMAL
STATUS: NORMAL
STATUS: NORMAL

## 2018-07-07 NOTE — DISCHARGE SUMMARY
[facility]     Department of Internal Medicine - Staff Internal Medicine Service    INPATIENT DISCHARGE SUMMARY        Patient Identification:  Paula Harmon is a 66 y.o. female. :  1955  MRN: [id number]     Acct: [de-identified]   Admit Date:  2018  Discharge date and time: 2018  1:31 PM   Attending Provider: No att. providers found                                     Admission Diagnoses: Altered mental status [R41.82]  Altered mental status [R41.82]    Discharge Diagnoses:   Principal Problem:    Acute metabolic encephalopathy  Active Problems:    Altered mental status    Thyroid nodule, Rt. Lobe. 27 mm    Elevated CK    Elevated myoglobin level    Lactic acidosis    Hyperammonemia (HCC)    Leukocytosis    Polycythemia secondary to smoking    Dehydration    Benzodiazepine toxicity    Benzodiazepine overdose of undetermined intent    Recurrent major depressive disorder, in partial remission (HCC)    Restless leg syndrome, nonfamilial, uncontrolled  Resolved Problems:    * No resolved hospital problems. *       Consults:   neurology and psychiatry    Brief Inpatient course: The patient is a pleasant 66 y.o. female with significant medical history of restless leg syndrome, osteoarthritis, depression, insomnia, and frequent UTIs presented to [facility]'s emergency Department with altered mental status via EMS. Treated as acute metabolic encepalopathy 2/2 suspected benzodiazepine overdose. UDS positive for benzodiazepines, ammonia 63. Patient hydrated with fluids and monitored for mentation. Mental status improved but patient upset about thing going on in life. psychiatry recommended outpatient referral. Neurology thinks this is toxic metabolic encephalopathy and not seizures. Klonopin level normal. Discharged in stable condition.     Procedures:  None     Any Hospital Acquired Infections: none    Discharge Functional Status:  stable    Disposition: home    Patient

## 2018-07-24 ENCOUNTER — OFFICE VISIT (OUTPATIENT)
Dept: FAMILY MEDICINE CLINIC | Age: 63
End: 2018-07-24
Payer: MEDICARE

## 2018-07-24 VITALS
HEART RATE: 72 BPM | DIASTOLIC BLOOD PRESSURE: 72 MMHG | WEIGHT: 203.4 LBS | SYSTOLIC BLOOD PRESSURE: 108 MMHG | BODY MASS INDEX: 33.85 KG/M2

## 2018-07-24 DIAGNOSIS — R19.7 DIARRHEA, UNSPECIFIED TYPE: ICD-10-CM

## 2018-07-24 DIAGNOSIS — R42 DIZZINESS: ICD-10-CM

## 2018-07-24 DIAGNOSIS — E04.1 THYROID NODULE: Primary | ICD-10-CM

## 2018-07-24 DIAGNOSIS — L30.9 DERMATITIS: ICD-10-CM

## 2018-07-24 DIAGNOSIS — K21.9 GASTROESOPHAGEAL REFLUX DISEASE WITHOUT ESOPHAGITIS: ICD-10-CM

## 2018-07-24 DIAGNOSIS — R49.0 HOARSENESS OF VOICE: ICD-10-CM

## 2018-07-24 DIAGNOSIS — G47.00 INSOMNIA, UNSPECIFIED TYPE: ICD-10-CM

## 2018-07-24 PROCEDURE — 3017F COLORECTAL CA SCREEN DOC REV: CPT | Performed by: FAMILY MEDICINE

## 2018-07-24 PROCEDURE — 1111F DSCHRG MED/CURRENT MED MERGE: CPT | Performed by: FAMILY MEDICINE

## 2018-07-24 PROCEDURE — 99214 OFFICE O/P EST MOD 30 MIN: CPT | Performed by: FAMILY MEDICINE

## 2018-07-24 PROCEDURE — 1036F TOBACCO NON-USER: CPT | Performed by: FAMILY MEDICINE

## 2018-07-24 PROCEDURE — G8417 CALC BMI ABV UP PARAM F/U: HCPCS | Performed by: FAMILY MEDICINE

## 2018-07-24 PROCEDURE — G8427 DOCREV CUR MEDS BY ELIG CLIN: HCPCS | Performed by: FAMILY MEDICINE

## 2018-07-24 RX ORDER — PANTOPRAZOLE SODIUM 20 MG/1
20 TABLET, DELAYED RELEASE ORAL DAILY
Qty: 30 TABLET | Refills: 3 | Status: SHIPPED | OUTPATIENT
Start: 2018-07-24 | End: 2019-02-13 | Stop reason: ALTCHOICE

## 2018-07-24 RX ORDER — TRIAMCINOLONE ACETONIDE 1 MG/G
CREAM TOPICAL
Qty: 80 G | Refills: 1 | Status: SHIPPED | OUTPATIENT
Start: 2018-07-24 | End: 2019-02-13 | Stop reason: ALTCHOICE

## 2018-07-24 RX ORDER — TRAZODONE HYDROCHLORIDE 50 MG/1
50 TABLET ORAL NIGHTLY
Qty: 30 TABLET | Refills: 3 | Status: SHIPPED | OUTPATIENT
Start: 2018-07-24 | End: 2019-01-15 | Stop reason: SDUPTHER

## 2018-07-24 NOTE — PROGRESS NOTES
Visit Information    Have you changed or started any medications since your last visit including any over-the-counter medicines, vitamins, or herbal medicines? no   Are you having any side effects from any of your medications? -  no  Have you stopped taking any of your medications? Is so, why? -  yes    Have you seen any other physician or provider since your last visit? yes  Have you had any other diagnostic tests since your last visit? yes  Have you been seen in the emergency room and/or had an admission to a hospital since we last saw you? yes  Have you had your routine dental cleaning in the past 6 months? yes -     Have you activated your [employer] account?  If not, what are your barriers?declined     Patient Care Team:  Nkechi Dial DO as PCP - General (Family Medicine)  Nuno Chintan, APRN - CNP as PCP - [facility] Attributed Provider    Medical History Review  Past Medical, Family, and Social History reviewed and  contribute to the patient presenting condition    Health Maintenance   Topic Date Due    Lipid screen  08/27/2018 (Originally 3/21/2017)    DTaP/Tdap/Td vaccine (1 - Tdap) 08/27/2018 (Originally 2/12/1976)    Breast cancer screen  08/27/2018 (Originally 3/5/2014)    Cervical cancer screen  08/27/2018 (Originally 2/12/1978)    Pneumococcal med risk (1 of 1 - PPSV23) 08/27/2018 (Originally 2/12/1976)    Shingles Vaccine (1 of 2 - 2 Dose Series) 08/27/2018 (Originally 2/12/2007)    Hepatitis C screen  08/27/2018 (Originally 1955)    HIV screen  08/27/2018 (Originally 2/12/1972)    Colon cancer screen colonoscopy  08/27/2018 (Originally 2/12/2007)    Flu vaccine (1) 09/01/2018
Judgment and thought content normal.   Vitals reviewed. Assessment:      1. Thyroid nodule    2. Diarrhea, unspecified type    3. Dermatitis    4. Hoarseness of voice    5. Gastroesophageal reflux disease without esophagitis    6. Dizziness    7. Insomnia, unspecified type          Plan:      1. Check Thyroid US  2. Check stook studies  3. Topical CS  4. May be related to nodule, GERD, vocal chord strain. It is improving, but if it doesn't completely resolve over the next month will refer to ENT  5. Trial of PPI  6. Likely Vertigo. Will continue to monitor for resolution  7.  Trial of Trazodone in place of Klonopin  FU 1 month

## 2018-07-29 PROBLEM — E86.0 DEHYDRATION: Status: RESOLVED | Noted: 2018-06-29 | Resolved: 2018-07-29

## 2018-07-30 ENCOUNTER — HOSPITAL ENCOUNTER (OUTPATIENT)
Age: 63
Setting detail: SPECIMEN
Discharge: HOME | End: 2018-07-30
Payer: MEDICARE

## 2018-07-30 DIAGNOSIS — R19.7 DIARRHEA, UNSPECIFIED TYPE: ICD-10-CM

## 2018-07-30 LAB
-: NORMAL
DATE, STOOL #1: NORMAL
DATE, STOOL #2: NORMAL
DATE, STOOL #3: NORMAL
DIRECT EXAM: NORMAL
DIRECT EXAM: NORMAL
HEMOCCULT SP1 STL QL: NEGATIVE
HEMOCCULT SP2 STL QL: NORMAL
HEMOCCULT SP3 STL QL: NORMAL
LACTOFERRIN, QUAL: NORMAL
Lab: NORMAL
REASON FOR REJECTION: NORMAL
SPECIMEN DESCRIPTION: NORMAL
STATUS: NORMAL
TIME, STOOL #1: NORMAL
TIME, STOOL #2: NORMAL
TIME, STOOL #3: NORMAL
ZZ NTE CLEAN UP: ORDERED TEST: NORMAL
ZZ NTE WITH NAME CLEAN UP: SPECIMEN SOURCE: NORMAL

## 2018-07-31 LAB
Lab: NORMAL
MICRO OVA & PARASITES: NORMAL
SPECIMEN DESCRIPTION: NORMAL
STATUS: NORMAL

## 2018-08-01 LAB
FAT QUALITATIVE SPLIT STOOL: NORMAL
FECAL NEUTRAL FAT: NORMAL

## 2018-08-21 ENCOUNTER — APPOINTMENT (OUTPATIENT)
Dept: GENERAL RADIOLOGY | Age: 63
DRG: 133 | End: 2018-08-21
Payer: MEDICARE

## 2018-08-21 ENCOUNTER — APPOINTMENT (OUTPATIENT)
Dept: CT IMAGING | Age: 63
DRG: 133 | End: 2018-08-21
Payer: MEDICARE

## 2018-08-21 ENCOUNTER — HOSPITAL ENCOUNTER (INPATIENT)
Age: 63
LOS: 10 days | Discharge: NURSING, REHAB OR LONG-TERM CARE | DRG: 133 | End: 2018-08-31
Attending: EMERGENCY MEDICINE | Admitting: INTERNAL MEDICINE
Payer: MEDICARE

## 2018-08-21 DIAGNOSIS — I95.89 OTHER SPECIFIED HYPOTENSION: ICD-10-CM

## 2018-08-21 DIAGNOSIS — N17.9 ACUTE KIDNEY INJURY (HCC): ICD-10-CM

## 2018-08-21 DIAGNOSIS — F41.9 ANXIETY: ICD-10-CM

## 2018-08-21 DIAGNOSIS — G25.81 RESTLESS LEG SYNDROME: ICD-10-CM

## 2018-08-21 DIAGNOSIS — R79.89 ELEVATED LACTIC ACID LEVEL: ICD-10-CM

## 2018-08-21 DIAGNOSIS — R41.89 UNRESPONSIVE: Primary | ICD-10-CM

## 2018-08-21 PROBLEM — G93.40 ENCEPHALOPATHY: Status: ACTIVE | Noted: 2018-08-21

## 2018-08-21 PROBLEM — M62.82 NON-TRAUMATIC RHABDOMYOLYSIS: Status: ACTIVE | Noted: 2018-08-21

## 2018-08-21 PROBLEM — I47.29 NSVT (NONSUSTAINED VENTRICULAR TACHYCARDIA): Status: ACTIVE | Noted: 2018-08-21

## 2018-08-21 PROBLEM — R73.9 HYPERGLYCEMIA: Status: ACTIVE | Noted: 2018-08-21

## 2018-08-21 PROBLEM — R57.1 HYPOVOLEMIC SHOCK (HCC): Status: ACTIVE | Noted: 2018-08-21

## 2018-08-21 PROBLEM — F32.A DEPRESSION: Status: ACTIVE | Noted: 2018-08-21

## 2018-08-21 PROBLEM — E87.6 HYPOKALEMIA: Status: ACTIVE | Noted: 2018-08-21

## 2018-08-21 PROBLEM — J69.0 ASPIRATION PNEUMONIA (HCC): Status: ACTIVE | Noted: 2018-08-21

## 2018-08-21 PROBLEM — E86.0 DEHYDRATION: Status: ACTIVE | Noted: 2018-08-21

## 2018-08-21 LAB
-: NORMAL
ABO/RH: NORMAL
ABSOLUTE EOS #: 0.03 K/UL (ref 0–0.44)
ABSOLUTE IMMATURE GRANULOCYTE: 0.23 K/UL (ref 0–0.3)
ABSOLUTE LYMPH #: 3.68 K/UL (ref 1.1–3.7)
ABSOLUTE MONO #: 0.56 K/UL (ref 0.1–1.2)
ACETAMINOPHEN LEVEL: <5 UG/ML (ref 10–30)
ALBUMIN SERPL-MCNC: 4.2 G/DL (ref 3.5–5.2)
ALBUMIN/GLOBULIN RATIO: 1.4 (ref 1–2.5)
ALLEN TEST: ABNORMAL
ALLEN TEST: ABNORMAL
ALP BLD-CCNC: 86 U/L (ref 35–104)
ALT SERPL-CCNC: 13 U/L (ref 5–33)
AMMONIA: 67 UMOL/L (ref 11–51)
AMPHETAMINE SCREEN URINE: NEGATIVE
ANION GAP SERPL CALCULATED.3IONS-SCNC: 27 MMOL/L (ref 9–17)
ANION GAP: 12 MMOL/L (ref 7–16)
ANTIBODY SCREEN: NEGATIVE
ARM BAND NUMBER: NORMAL
AST SERPL-CCNC: 22 U/L
BARBITURATE SCREEN URINE: NEGATIVE
BASOPHILS # BLD: 0 % (ref 0–2)
BASOPHILS ABSOLUTE: 0.04 K/UL (ref 0–0.2)
BENZODIAZEPINE SCREEN, URINE: NEGATIVE
BETA-HYDROXYBUTYRATE: 0.11 MMOL/L (ref 0.02–0.27)
BILIRUB SERPL-MCNC: <0.1 MG/DL (ref 0.3–1.2)
BILIRUBIN DIRECT: <0.08 MG/DL
BILIRUBIN URINE: NEGATIVE
BILIRUBIN, INDIRECT: ABNORMAL MG/DL (ref 0–1)
BLD PROD TYP BPU: NORMAL
BLD PROD TYP BPU: NORMAL
BUN BLDV-MCNC: 10 MG/DL (ref 8–23)
BUN/CREAT BLD: ABNORMAL (ref 9–20)
BUPRENORPHINE URINE: NORMAL
CALCIUM SERPL-MCNC: 8.8 MG/DL (ref 8.6–10.4)
CANNABINOID SCREEN URINE: NEGATIVE
CHLORIDE BLD-SCNC: 100 MMOL/L (ref 98–107)
CO2: 20 MMOL/L (ref 20–31)
COCAINE METABOLITE, URINE: NEGATIVE
COLOR: YELLOW
COMMENT UA: ABNORMAL
CORTISOL COLLECTION INFO: ABNORMAL
CORTISOL: 56.5 UG/DL (ref 2.7–18.4)
CREAT SERPL-MCNC: 2.08 MG/DL (ref 0.5–0.9)
CROSSMATCH RESULT: NORMAL
CROSSMATCH RESULT: NORMAL
DIFFERENTIAL TYPE: ABNORMAL
DISPENSE STATUS BLOOD BANK: NORMAL
DISPENSE STATUS BLOOD BANK: NORMAL
EOSINOPHILS RELATIVE PERCENT: 0 % (ref 1–4)
ETHANOL PERCENT: <0.01 %
ETHANOL: <10 MG/DL
EXPIRATION DATE: NORMAL
FIO2: ABNORMAL
FIO2: ABNORMAL
GFR AFRICAN AMERICAN: 29 ML/MIN
GFR NON-AFRICAN AMERICAN: 24 ML/MIN
GFR NON-AFRICAN AMERICAN: ABNORMAL ML/MIN
GFR SERPL CREATININE-BSD FRML MDRD: ABNORMAL ML/MIN
GFR SERPL CREATININE-BSD FRML MDRD: ABNORMAL ML/MIN/{1.73_M2}
GLOBULIN: ABNORMAL G/DL (ref 1.5–3.8)
GLUCOSE BLD-MCNC: 226 MG/DL (ref 65–105)
GLUCOSE BLD-MCNC: 249 MG/DL (ref 65–105)
GLUCOSE BLD-MCNC: 301 MG/DL (ref 70–99)
GLUCOSE BLD-MCNC: 303 MG/DL (ref 74–100)
GLUCOSE URINE: NEGATIVE
HCO3 VENOUS: 24.7 MMOL/L (ref 22–29)
HCT VFR BLD CALC: 55.2 % (ref 36.3–47.1)
HEMOGLOBIN: 17.5 G/DL (ref 11.9–15.1)
IMMATURE GRANULOCYTES: 1 %
INR BLD: 1.1
KETONES, URINE: ABNORMAL
LACTIC ACID, WHOLE BLOOD: 11.6 MMOL/L (ref 0.7–2.1)
LEUKOCYTE ESTERASE, URINE: NEGATIVE
LIPASE: 36 U/L (ref 13–60)
LYMPHOCYTES # BLD: 19 % (ref 24–43)
MAGNESIUM: 2.2 MG/DL (ref 1.6–2.6)
MAGNESIUM: 2.9 MG/DL (ref 1.6–2.6)
MCH RBC QN AUTO: 30.9 PG (ref 25.2–33.5)
MCHC RBC AUTO-ENTMCNC: 31.7 G/DL (ref 28.4–34.8)
MCV RBC AUTO: 97.5 FL (ref 82.6–102.9)
MDMA URINE: NORMAL
METHADONE SCREEN, URINE: NEGATIVE
METHAMPHETAMINE, URINE: NORMAL
MODE: ABNORMAL
MODE: ABNORMAL
MONOCYTES # BLD: 3 % (ref 3–12)
NEGATIVE BASE EXCESS, ART: 5 (ref 0–2)
NEGATIVE BASE EXCESS, VEN: 4 (ref 0–2)
NITRITE, URINE: NEGATIVE
NRBC AUTOMATED: 0 PER 100 WBC
O2 DEVICE/FLOW/%: ABNORMAL
O2 DEVICE/FLOW/%: ABNORMAL
O2 SAT, VEN: 98 % (ref 60–85)
OPIATES, URINE: NEGATIVE
OXYCODONE SCREEN URINE: NEGATIVE
PARTIAL THROMBOPLASTIN TIME: 23.3 SEC (ref 20.5–30.5)
PATIENT TEMP: ABNORMAL
PATIENT TEMP: ABNORMAL
PCO2, VEN: 57.6 MM HG (ref 41–51)
PDW BLD-RTO: 14.5 % (ref 11.8–14.4)
PH UA: 6 (ref 5–8)
PH VENOUS: 7.24 (ref 7.32–7.43)
PHENCYCLIDINE, URINE: NEGATIVE
PLATELET # BLD: 350 K/UL (ref 138–453)
PLATELET ESTIMATE: ABNORMAL
PMV BLD AUTO: 10.2 FL (ref 8.1–13.5)
PO2, VEN: 117.2 MM HG (ref 30–50)
POC CHLORIDE: 111 MMOL/L (ref 98–107)
POC CREATININE: 1.31 MG/DL (ref 0.51–1.19)
POC HCO3: 21.4 MMOL/L (ref 21–28)
POC HEMATOCRIT: 56 % (ref 36–46)
POC HEMOGLOBIN: 19 G/DL (ref 12–16)
POC IONIZED CALCIUM: 1.01 MMOL/L (ref 1.15–1.33)
POC LACTIC ACID: 11.41 MMOL/L (ref 0.56–1.39)
POC LACTIC ACID: 8.32 MMOL/L (ref 0.56–1.39)
POC O2 SATURATION: 97 % (ref 94–98)
POC PCO2 TEMP: ABNORMAL MM HG
POC PCO2 TEMP: ABNORMAL MM HG
POC PCO2: 44.1 MM HG (ref 35–48)
POC PH TEMP: ABNORMAL
POC PH TEMP: ABNORMAL
POC PH: 7.29 (ref 7.35–7.45)
POC PO2 TEMP: ABNORMAL MM HG
POC PO2 TEMP: ABNORMAL MM HG
POC PO2: 102 MM HG (ref 83–108)
POC POTASSIUM: 3.1 MMOL/L (ref 3.5–4.5)
POC SODIUM: 148 MMOL/L (ref 138–146)
POC TROPONIN I: 0.06 NG/ML (ref 0–0.1)
POC TROPONIN I: 0.08 NG/ML (ref 0–0.1)
POC TROPONIN INTERP: NORMAL
POC TROPONIN INTERP: NORMAL
POSITIVE BASE EXCESS, ART: ABNORMAL (ref 0–3)
POSITIVE BASE EXCESS, VEN: ABNORMAL (ref 0–3)
POTASSIUM SERPL-SCNC: 2.8 MMOL/L (ref 3.7–5.3)
POTASSIUM SERPL-SCNC: 4.3 MMOL/L (ref 3.7–5.3)
PROPOXYPHENE, URINE: NORMAL
PROTEIN UA: NEGATIVE
PROTHROMBIN TIME: 11.8 SEC (ref 9–12)
RBC # BLD: 5.66 M/UL (ref 3.95–5.11)
RBC # BLD: ABNORMAL 10*6/UL
REASON FOR REJECTION: NORMAL
SALICYLATE LEVEL: <1 MG/DL (ref 3–10)
SAMPLE SITE: ABNORMAL
SAMPLE SITE: ABNORMAL
SEG NEUTROPHILS: 77 % (ref 36–65)
SEGMENTED NEUTROPHILS ABSOLUTE COUNT: 15.19 K/UL (ref 1.5–8.1)
SODIUM BLD-SCNC: 147 MMOL/L (ref 135–144)
SPECIFIC GRAVITY UA: 1.01 (ref 1–1.03)
T4 TOTAL: 6.9 UG/DL (ref 4.5–12)
TCO2 (CALC), ART: 23 MMOL/L (ref 22–29)
TEST INFORMATION: NORMAL
TOTAL CK: 862 U/L (ref 26–192)
TOTAL CO2, VENOUS: 27 MMOL/L (ref 23–30)
TOTAL PROTEIN: 7.1 G/DL (ref 6.4–8.3)
TOXIC TRICYCLIC SC,BLOOD: NEGATIVE
TRANSFUSION STATUS: NORMAL
TRANSFUSION STATUS: NORMAL
TRICYCLIC ANTIDEPRESSANTS, UR: NORMAL
TROPONIN INTERP: ABNORMAL
TROPONIN INTERP: ABNORMAL
TROPONIN T: 0.03 NG/ML
TROPONIN T: 0.03 NG/ML
TSH SERPL DL<=0.05 MIU/L-ACNC: 0.41 MIU/L (ref 0.3–5)
TURBIDITY: CLEAR
UNIT DIVISION: 0
UNIT DIVISION: 0
UNIT NUMBER: NORMAL
UNIT NUMBER: NORMAL
URINE HGB: NEGATIVE
UROBILINOGEN, URINE: NORMAL
WBC # BLD: 19.7 K/UL (ref 3.5–11.3)
WBC # BLD: ABNORMAL 10*3/UL
ZZ NTE CLEAN UP: ORDERED TEST: NORMAL
ZZ NTE WITH NAME CLEAN UP: SPECIMEN SOURCE: NORMAL

## 2018-08-21 PROCEDURE — 87086 URINE CULTURE/COLONY COUNT: CPT

## 2018-08-21 PROCEDURE — 96366 THER/PROPH/DIAG IV INF ADDON: CPT

## 2018-08-21 PROCEDURE — 80307 DRUG TEST PRSMV CHEM ANLYZR: CPT

## 2018-08-21 PROCEDURE — 84484 ASSAY OF TROPONIN QUANT: CPT

## 2018-08-21 PROCEDURE — 96368 THER/DIAG CONCURRENT INF: CPT

## 2018-08-21 PROCEDURE — 6360000002 HC RX W HCPCS: Performed by: EMERGENCY MEDICINE

## 2018-08-21 PROCEDURE — 96376 TX/PRO/DX INJ SAME DRUG ADON: CPT

## 2018-08-21 PROCEDURE — 83690 ASSAY OF LIPASE: CPT

## 2018-08-21 PROCEDURE — 99291 CRITICAL CARE FIRST HOUR: CPT | Performed by: INTERNAL MEDICINE

## 2018-08-21 PROCEDURE — 72125 CT NECK SPINE W/O DYE: CPT

## 2018-08-21 PROCEDURE — 6360000002 HC RX W HCPCS: Performed by: HOSPITALIST

## 2018-08-21 PROCEDURE — 82803 BLOOD GASES ANY COMBINATION: CPT

## 2018-08-21 PROCEDURE — 82607 VITAMIN B-12: CPT

## 2018-08-21 PROCEDURE — 80048 BASIC METABOLIC PNL TOTAL CA: CPT

## 2018-08-21 PROCEDURE — 6360000002 HC RX W HCPCS

## 2018-08-21 PROCEDURE — 87040 BLOOD CULTURE FOR BACTERIA: CPT

## 2018-08-21 PROCEDURE — 2500000003 HC RX 250 WO HCPCS: Performed by: STUDENT IN AN ORGANIZED HEALTH CARE EDUCATION/TRAINING PROGRAM

## 2018-08-21 PROCEDURE — 82140 ASSAY OF AMMONIA: CPT

## 2018-08-21 PROCEDURE — 93005 ELECTROCARDIOGRAM TRACING: CPT

## 2018-08-21 PROCEDURE — 71045 X-RAY EXAM CHEST 1 VIEW: CPT

## 2018-08-21 PROCEDURE — 2580000003 HC RX 258: Performed by: STUDENT IN AN ORGANIZED HEALTH CARE EDUCATION/TRAINING PROGRAM

## 2018-08-21 PROCEDURE — 82010 KETONE BODYS QUAN: CPT

## 2018-08-21 PROCEDURE — 2580000003 HC RX 258: Performed by: EMERGENCY MEDICINE

## 2018-08-21 PROCEDURE — 80076 HEPATIC FUNCTION PANEL: CPT

## 2018-08-21 PROCEDURE — 82330 ASSAY OF CALCIUM: CPT

## 2018-08-21 PROCEDURE — 86920 COMPATIBILITY TEST SPIN: CPT

## 2018-08-21 PROCEDURE — 36600 WITHDRAWAL OF ARTERIAL BLOOD: CPT

## 2018-08-21 PROCEDURE — 94762 N-INVAS EAR/PLS OXIMTRY CONT: CPT

## 2018-08-21 PROCEDURE — 84443 ASSAY THYROID STIM HORMONE: CPT

## 2018-08-21 PROCEDURE — 81003 URINALYSIS AUTO W/O SCOPE: CPT

## 2018-08-21 PROCEDURE — 82947 ASSAY GLUCOSE BLOOD QUANT: CPT

## 2018-08-21 PROCEDURE — 85610 PROTHROMBIN TIME: CPT

## 2018-08-21 PROCEDURE — 83036 HEMOGLOBIN GLYCOSYLATED A1C: CPT

## 2018-08-21 PROCEDURE — 74176 CT ABD & PELVIS W/O CONTRAST: CPT

## 2018-08-21 PROCEDURE — 83605 ASSAY OF LACTIC ACID: CPT

## 2018-08-21 PROCEDURE — 83735 ASSAY OF MAGNESIUM: CPT

## 2018-08-21 PROCEDURE — 82533 TOTAL CORTISOL: CPT

## 2018-08-21 PROCEDURE — 86901 BLOOD TYPING SEROLOGIC RH(D): CPT

## 2018-08-21 PROCEDURE — C9113 INJ PANTOPRAZOLE SODIUM, VIA: HCPCS | Performed by: EMERGENCY MEDICINE

## 2018-08-21 PROCEDURE — 85730 THROMBOPLASTIN TIME PARTIAL: CPT

## 2018-08-21 PROCEDURE — 86850 RBC ANTIBODY SCREEN: CPT

## 2018-08-21 PROCEDURE — 6360000002 HC RX W HCPCS: Performed by: STUDENT IN AN ORGANIZED HEALTH CARE EDUCATION/TRAINING PROGRAM

## 2018-08-21 PROCEDURE — 36415 COLL VENOUS BLD VENIPUNCTURE: CPT

## 2018-08-21 PROCEDURE — 85025 COMPLETE CBC W/AUTO DIFF WBC: CPT

## 2018-08-21 PROCEDURE — 82550 ASSAY OF CK (CPK): CPT

## 2018-08-21 PROCEDURE — 94002 VENT MGMT INPAT INIT DAY: CPT

## 2018-08-21 PROCEDURE — 84436 ASSAY OF TOTAL THYROXINE: CPT

## 2018-08-21 PROCEDURE — 5A1945Z RESPIRATORY VENTILATION, 24-96 CONSECUTIVE HOURS: ICD-10-PCS | Performed by: INTERNAL MEDICINE

## 2018-08-21 PROCEDURE — 86900 BLOOD TYPING SEROLOGIC ABO: CPT

## 2018-08-21 PROCEDURE — 94770 HC ETCO2 MONITOR DAILY: CPT

## 2018-08-21 PROCEDURE — 87641 MR-STAPH DNA AMP PROBE: CPT

## 2018-08-21 PROCEDURE — 2580000003 HC RX 258: Performed by: HOSPITALIST

## 2018-08-21 PROCEDURE — 99255 IP/OBS CONSLTJ NEW/EST HI 80: CPT | Performed by: PSYCHIATRY & NEUROLOGY

## 2018-08-21 PROCEDURE — 70450 CT HEAD/BRAIN W/O DYE: CPT

## 2018-08-21 PROCEDURE — 2000000000 HC ICU R&B

## 2018-08-21 PROCEDURE — 96365 THER/PROPH/DIAG IV INF INIT: CPT

## 2018-08-21 PROCEDURE — 84295 ASSAY OF SERUM SODIUM: CPT

## 2018-08-21 PROCEDURE — B543ZZA ULTRASONOGRAPHY OF RIGHT JUGULAR VEINS, GUIDANCE: ICD-10-PCS | Performed by: EMERGENCY MEDICINE

## 2018-08-21 PROCEDURE — 82565 ASSAY OF CREATININE: CPT

## 2018-08-21 PROCEDURE — 82435 ASSAY OF BLOOD CHLORIDE: CPT

## 2018-08-21 PROCEDURE — 36620 INSERTION CATHETER ARTERY: CPT

## 2018-08-21 PROCEDURE — G0480 DRUG TEST DEF 1-7 CLASSES: HCPCS

## 2018-08-21 PROCEDURE — 99291 CRITICAL CARE FIRST HOUR: CPT

## 2018-08-21 PROCEDURE — 05HM33Z INSERTION OF INFUSION DEVICE INTO RIGHT INTERNAL JUGULAR VEIN, PERCUTANEOUS APPROACH: ICD-10-PCS | Performed by: EMERGENCY MEDICINE

## 2018-08-21 PROCEDURE — 96367 TX/PROPH/DG ADDL SEQ IV INF: CPT

## 2018-08-21 PROCEDURE — 6370000000 HC RX 637 (ALT 250 FOR IP): Performed by: STUDENT IN AN ORGANIZED HEALTH CARE EDUCATION/TRAINING PROGRAM

## 2018-08-21 PROCEDURE — 84132 ASSAY OF SERUM POTASSIUM: CPT

## 2018-08-21 PROCEDURE — 85014 HEMATOCRIT: CPT

## 2018-08-21 PROCEDURE — 82746 ASSAY OF FOLIC ACID SERUM: CPT

## 2018-08-21 RX ORDER — DEXTROSE MONOHYDRATE 25 G/50ML
12.5 INJECTION, SOLUTION INTRAVENOUS PRN
Status: DISCONTINUED | OUTPATIENT
Start: 2018-08-21 | End: 2018-08-31 | Stop reason: HOSPADM

## 2018-08-21 RX ORDER — POTASSIUM CHLORIDE 29.8 MG/ML
20 INJECTION INTRAVENOUS PRN
Status: DISCONTINUED | OUTPATIENT
Start: 2018-08-21 | End: 2018-08-24

## 2018-08-21 RX ORDER — PREGABALIN 150 MG/1
150 CAPSULE ORAL 2 TIMES DAILY
Status: ON HOLD | COMMUNITY
End: 2018-08-29

## 2018-08-21 RX ORDER — FENTANYL CITRATE 50 UG/ML
INJECTION, SOLUTION INTRAMUSCULAR; INTRAVENOUS
Status: COMPLETED
Start: 2018-08-21 | End: 2018-08-21

## 2018-08-21 RX ORDER — ONDANSETRON 2 MG/ML
4 INJECTION INTRAMUSCULAR; INTRAVENOUS EVERY 6 HOURS PRN
Status: DISCONTINUED | OUTPATIENT
Start: 2018-08-21 | End: 2018-08-31 | Stop reason: HOSPADM

## 2018-08-21 RX ORDER — SODIUM CHLORIDE 0.9 % (FLUSH) 0.9 %
10 SYRINGE (ML) INJECTION PRN
Status: DISCONTINUED | OUTPATIENT
Start: 2018-08-21 | End: 2018-08-31 | Stop reason: HOSPADM

## 2018-08-21 RX ORDER — POTASSIUM CHLORIDE 7.45 MG/ML
10 INJECTION INTRAVENOUS ONCE
Status: DISCONTINUED | OUTPATIENT
Start: 2018-08-21 | End: 2018-08-21

## 2018-08-21 RX ORDER — 0.9 % SODIUM CHLORIDE 0.9 %
1000 INTRAVENOUS SOLUTION INTRAVENOUS ONCE
Status: COMPLETED | OUTPATIENT
Start: 2018-08-21 | End: 2018-08-21

## 2018-08-21 RX ORDER — VANCOMYCIN HYDROCHLORIDE 1 G/200ML
1000 INJECTION, SOLUTION INTRAVENOUS ONCE
Status: COMPLETED | OUTPATIENT
Start: 2018-08-21 | End: 2018-08-21

## 2018-08-21 RX ORDER — PANTOPRAZOLE SODIUM 20 MG/1
20 TABLET, DELAYED RELEASE ORAL DAILY
COMMUNITY
End: 2018-09-11 | Stop reason: SDUPTHER

## 2018-08-21 RX ORDER — 0.9 % SODIUM CHLORIDE 0.9 %
20 INTRAVENOUS SOLUTION INTRAVENOUS ONCE
Status: DISCONTINUED | OUTPATIENT
Start: 2018-08-21 | End: 2018-08-21

## 2018-08-21 RX ORDER — AMIODARONE HYDROCHLORIDE 50 MG/ML
150 INJECTION, SOLUTION INTRAVENOUS ONCE
Status: COMPLETED | OUTPATIENT
Start: 2018-08-21 | End: 2018-08-21

## 2018-08-21 RX ORDER — POTASSIUM CHLORIDE 7.45 MG/ML
10 INJECTION INTRAVENOUS
Status: DISCONTINUED | OUTPATIENT
Start: 2018-08-21 | End: 2018-08-21

## 2018-08-21 RX ORDER — TRAZODONE HYDROCHLORIDE 50 MG/1
50 TABLET ORAL NIGHTLY
Status: ON HOLD | COMMUNITY
End: 2018-08-29 | Stop reason: HOSPADM

## 2018-08-21 RX ORDER — MIRTAZAPINE 30 MG/1
30 TABLET, FILM COATED ORAL NIGHTLY
Status: ON HOLD | COMMUNITY
End: 2018-08-29

## 2018-08-21 RX ORDER — SODIUM CHLORIDE 0.9 % (FLUSH) 0.9 %
10 SYRINGE (ML) INJECTION EVERY 12 HOURS SCHEDULED
Status: DISCONTINUED | OUTPATIENT
Start: 2018-08-21 | End: 2018-08-31 | Stop reason: HOSPADM

## 2018-08-21 RX ORDER — 0.9 % SODIUM CHLORIDE 0.9 %
1000 INTRAVENOUS SOLUTION INTRAVENOUS ONCE
Status: COMPLETED | OUTPATIENT
Start: 2018-08-21 | End: 2018-08-22

## 2018-08-21 RX ORDER — FENTANYL CITRATE 50 UG/ML
50 INJECTION, SOLUTION INTRAMUSCULAR; INTRAVENOUS
Status: DISCONTINUED | OUTPATIENT
Start: 2018-08-21 | End: 2018-08-23

## 2018-08-21 RX ORDER — CLONAZEPAM 1 MG/1
1 TABLET ORAL DAILY
Status: ON HOLD | COMMUNITY
End: 2018-08-29 | Stop reason: HOSPADM

## 2018-08-21 RX ORDER — DEXTROSE MONOHYDRATE 50 MG/ML
100 INJECTION, SOLUTION INTRAVENOUS PRN
Status: DISCONTINUED | OUTPATIENT
Start: 2018-08-21 | End: 2018-08-31 | Stop reason: HOSPADM

## 2018-08-21 RX ORDER — SODIUM CHLORIDE 9 MG/ML
INJECTION, SOLUTION INTRAVENOUS CONTINUOUS
Status: DISCONTINUED | OUTPATIENT
Start: 2018-08-21 | End: 2018-08-22

## 2018-08-21 RX ORDER — ROPINIROLE 1 MG/1
1 TABLET, FILM COATED ORAL 2 TIMES DAILY
Status: ON HOLD | COMMUNITY
End: 2018-08-29 | Stop reason: HOSPADM

## 2018-08-21 RX ORDER — NICOTINE POLACRILEX 4 MG
15 LOZENGE BUCCAL PRN
Status: DISCONTINUED | OUTPATIENT
Start: 2018-08-21 | End: 2018-08-31 | Stop reason: HOSPADM

## 2018-08-21 RX ADMIN — AMIODARONE HYDROCHLORIDE 1 MG/MIN: 50 INJECTION, SOLUTION INTRAVENOUS at 12:36

## 2018-08-21 RX ADMIN — Medication 10 ML: at 17:46

## 2018-08-21 RX ADMIN — AMIODARONE HYDROCHLORIDE 1 MG/MIN: 50 INJECTION, SOLUTION INTRAVENOUS at 23:13

## 2018-08-21 RX ADMIN — SODIUM CHLORIDE 8 MG/HR: 9 INJECTION, SOLUTION INTRAVENOUS at 23:13

## 2018-08-21 RX ADMIN — FENTANYL CITRATE 50 MCG: 50 INJECTION, SOLUTION INTRAMUSCULAR; INTRAVENOUS at 21:29

## 2018-08-21 RX ADMIN — FENTANYL CITRATE 50 MCG: 50 INJECTION, SOLUTION INTRAMUSCULAR; INTRAVENOUS at 21:11

## 2018-08-21 RX ADMIN — PIPERACILLIN AND TAZOBACTAM 3.38 G: 3; .375 INJECTION, POWDER, LYOPHILIZED, FOR SOLUTION INTRAVENOUS; PARENTERAL at 11:06

## 2018-08-21 RX ADMIN — SODIUM CHLORIDE 80 MG: 9 INJECTION, SOLUTION INTRAVENOUS at 11:30

## 2018-08-21 RX ADMIN — POTASSIUM CHLORIDE 10 MEQ: 10 INJECTION, SOLUTION INTRAVENOUS at 15:16

## 2018-08-21 RX ADMIN — SODIUM CHLORIDE 1000 ML: 9 INJECTION, SOLUTION INTRAVENOUS at 11:00

## 2018-08-21 RX ADMIN — SODIUM CHLORIDE 1000 ML: 9 INJECTION, SOLUTION INTRAVENOUS at 21:12

## 2018-08-21 RX ADMIN — SODIUM CHLORIDE 3 G: 900 INJECTION INTRAVENOUS at 18:00

## 2018-08-21 RX ADMIN — INSULIN LISPRO 2 UNITS: 100 INJECTION, SOLUTION INTRAVENOUS; SUBCUTANEOUS at 22:08

## 2018-08-21 RX ADMIN — OCTREOTIDE ACETATE 25 MCG/HR: 500 INJECTION, SOLUTION INTRAVENOUS; SUBCUTANEOUS at 13:51

## 2018-08-21 RX ADMIN — AMIODARONE HYDROCHLORIDE 1 MG/MIN: 50 INJECTION, SOLUTION INTRAVENOUS at 19:58

## 2018-08-21 RX ADMIN — POTASSIUM CHLORIDE 20 MEQ: 400 INJECTION, SOLUTION INTRAVENOUS at 19:03

## 2018-08-21 RX ADMIN — Medication 1 MG/HR: at 22:04

## 2018-08-21 RX ADMIN — AMIODARONE HYDROCHLORIDE 150 MG: 50 INJECTION, SOLUTION INTRAVENOUS at 11:14

## 2018-08-21 RX ADMIN — SODIUM CHLORIDE: 9 INJECTION, SOLUTION INTRAVENOUS at 16:12

## 2018-08-21 RX ADMIN — VANCOMYCIN HYDROCHLORIDE 1000 MG: 1 INJECTION, SOLUTION INTRAVENOUS at 12:18

## 2018-08-21 RX ADMIN — SODIUM CHLORIDE 8 MG/HR: 9 INJECTION, SOLUTION INTRAVENOUS at 12:13

## 2018-08-21 RX ADMIN — SODIUM CHLORIDE 1000 ML: 9 INJECTION, SOLUTION INTRAVENOUS at 10:33

## 2018-08-21 RX ADMIN — NOREPINEPHRINE BITARTRATE 24 MCG/MIN: 1 INJECTION INTRAVENOUS at 23:13

## 2018-08-21 RX ADMIN — POTASSIUM CHLORIDE 20 MEQ: 400 INJECTION, SOLUTION INTRAVENOUS at 16:30

## 2018-08-21 RX ADMIN — POTASSIUM CHLORIDE 20 MEQ: 400 INJECTION, SOLUTION INTRAVENOUS at 17:35

## 2018-08-21 RX ADMIN — Medication 10 ML: at 17:42

## 2018-08-21 RX ADMIN — SODIUM CHLORIDE: 9 INJECTION, SOLUTION INTRAVENOUS at 17:57

## 2018-08-21 RX ADMIN — NOREPINEPHRINE BITARTRATE 5 MCG/MIN: 1 INJECTION INTRAVENOUS at 12:35

## 2018-08-21 ASSESSMENT — PULMONARY FUNCTION TESTS
PIF_VALUE: 20
PIF_VALUE: 21
PIF_VALUE: 17
PIF_VALUE: 15
PIF_VALUE: 29

## 2018-08-21 NOTE — ED NOTES
Bed: 13  Expected date:   Expected time:   Means of arrival:   Comments:     Amarilis Cabrera RN  08/21/18 1021

## 2018-08-21 NOTE — CONSULTS
INITIAL CONSULTATION     HISTORY OF PRESENT ILLNESS: Michelle Dumont is a 66 y.o. female , admitted to the hospital on 8/21/2018 after being found unresponsive. Unknown regarding any past medical history or medication use. EMS intubated her orally and have right hand IV which is alone. She is now tachycardic and normotensive. GCS is 3. She had several episodes of coffee ground emesis post intubation. GI Consulted for management of GI bleed. Lactic acid = 11  Hb 17 g/dl    BUN 10 cr 2.0    PAST MEDICAL HISTORY:     No past medical history on file. No past surgical history on file. CURRENT MEDICATIONS:       Current Facility-Administered Medications:     vancomycin (VANCOCIN) 1000 mg in dextrose 5% 200 mL IVPB, 1,000 mg, Intravenous, Once, Sincere [place], DO    pantoprazole (PROTONIX) 80 mg in sodium chloride 0.9 % 100 mL infusion, 8 mg/hr, Intravenous, Continuous, Natalia Thapa MD, Last Rate: 10 mL/hr at 08/21/18 1213, 8 mg/hr at 08/21/18 1213    octreotide (SANDOSTATIN) 500 mcg in sodium chloride 0.9 % 100 mL infusion, 25 mcg/hr, Intravenous, Continuous, Natalia Thapa MD, Stopped at 08/21/18 1113    amiodarone (CORDARONE) 450 mg in dextrose 5 % 250 mL infusion, 1 mg/min, Intravenous, Continuous, Sincere Liao, DO  No current outpatient prescriptions on file. ALLERGIES:   Allergies not on file       FAMILY HISTORY: The patient's family history was reviewed. SOCIAL HISTORY:   Social History     Social History    Marital status: Single     Spouse name: N/A    Number of children: N/A    Years of education: N/A     Occupational History    Not on file.      Social History Main Topics    Smoking status: Not on file    Smokeless tobacco: Not on file    Alcohol use Not on file    Drug use: Unknown    Sexual activity: Not on file     Other Topics Concern    Not on file     Social History Narrative    No narrative on file         REVIEW OF SYSTEMS: A 12-point

## 2018-08-21 NOTE — H&P
BMI 25.90 kg/m²     Last Body weight:   Wt Readings from Last 3 Encounters:   08/21/18 165 lb 5.5 oz (75 kg)       Body Mass Index : Body mass index is 25.9 kg/m². PHYSICAL EXAMINATION :  Constitutional: Intubated , does not respond to pain stimuli. EENT: PERRLA, EOMI, sclera clear, anicteric  Neck: Supple, symmetrical, trachea midline, no adenopathy. Respiratory: clear to auscultation, no wheezes or rales. Cardiovascular:  normal S1, S2, no murmurs noted. Abdomen: soft, nontender, nondistended, no masses or organomegaly  Extremities:  peripheral pulses normal, no pedal edema  Neurological does not respond to painful stimuli. Any additional physical findings:    VENT SETTINGS (Comprehensive) (if applicable):  Vent Information  Ventilator Started: Yes  Vent Type:  (Rai T-1)  Vent Mode: AC/VC  Vt Ordered: 500 mL  Rate Set: 22 bmp  FiO2 : 60 %  PEEP/CPAP: 5  I Time/ I Time %: 0.85 s  Humidification Source: HME  Nitric Oxide/Epoprostenol In Use?: No  Additional Respiratory  Assessments  Pulse: 74  Resp: 22  SpO2: 96 %  End Tidal CO2: 31 (%)  Position: Supine  Humidification Source: HME    Laboratory findings:-    CBC:   Recent Labs      08/21/18   1056   WBC  19.7*   HGB  17.5*   PLT  350     BMP:    Recent Labs      08/21/18   1053  08/21/18   1056   NA   --   147*   K   --   2.8*   CL   --   100   CO2   --   20   BUN   --   10   CREATININE  1.31*  2.08*   GLUCOSE   --   301*     S. Calcium:  Recent Labs      08/21/18   1056   CALCIUM  8.8     S.  Magnesium:  Recent Labs      08/21/18   1056   MG  2.9*   S. Glucose:  Recent Labs      08/21/18   1053   POCGLU  303*     Hepatic functions:   Recent Labs      08/21/18   1056   ALKPHOS  86   ALT  13   AST  22   PROT  7.1   BILITOT  <0.10*   BILIDIR  <0.08   LABALBU  4.2     Cardiac enzymes:  Recent Labs      08/21/18   1031  08/21/18   1244   TROPONINI  0.06  0.08     Thyroid functions:   Lab Results   Component Value Date    TSH 0.41 08/21/2018 Urinalysis: negative for UTI    Microbiology:    Cultures during this admission:     Blood cultures:                 [] None drawn      [] Negative             []  Positive (Details:  )  Urine Culture:                   [] None drawn      [] Negative             []  Positive (Details:  )  Sputum Culture:               [] None drawn       [] Negative             []  Positive (Details:  )   Endotracheal aspirate:     [] None drawn       [] Negative             []  Positive (Details:  )          Ct Abdomen Pelvis Wo Contrast Additional Contrast? None, Result Date: 8/21/2018    1. Diverticulosis. 2. No adenopathy, free air or free fluid. 3. Airspace consolidation in the right base. Given provided history, aspiration may be considered. Ct Head Without Contrast, Result Date: 8/21/2018  No CT evidence for acute intracranial abnormality. Ct Cervical Spine Wo Contrast, Result Date: 8/21/2018  No acute abnormality of the cervical spine. Moderate degenerative change. Right thyroid lobe calcification. Xr Chest PortableResult Date: 8/21/2018    Right IJ catheter tip in the SVC. Bilateral airspace disease greater on the right. Xr Chest Portable, Result Date: 8/21/2018    1. Expected positions of medical support devices. 2. Nonspecific prominence of the interstitial/pulmonary vascular markings can be seen in setting of vascular congestion, chronic interstitial change and/or atypical pneumonitis. No focal airspace consolidation. Assessment and Plan   Active Problems:    Encephalopathy acute    Hyperglycemia    Hypovolemic shock (HCC)    Hypokalemia    KATELYNN (acute kidney injury) ([facility] [address]    Dehydration    NSVT (nonsustained ventricular tachycardia) ([facility])  Resolved Problems:    * No resolved hospital problems. *        Plan:    1. Continue ventilatory support. NPO.  2. Hypovolemic shock likely hemorrhagic. Continue levophed and fluid boluses, trend lactic acid x 2 occurences, troponin x 2 occurrences.   IVF at 100 ml/hr. 3. Continue octeotride gtt , PPI gtt, GI following, H n H every 8 hrs. 4. Follow up blood cultures, CK, KATELYNN will check BMP in am.  5. Replace potassium 100 meq today central line. 6. Neurology consulted for encephalopathy. 7. NSVT on amiodarone gtt, trend troponin x 2 occurrences. 8. EPC cuffs DVT prophylaxis.              Franci Joyce M.D.  PGY -3  Department of Internal Medicine/ Critical care  [facility] ([address] [place] [address])             8/21/2018, 3:19 PM

## 2018-08-21 NOTE — ED NOTES
Dr. Bradley Zendejas at bedside for central line placement     Jessica Martins, RN  08/21/18 295 Bia Li RN  08/21/18 0034

## 2018-08-21 NOTE — ED NOTES
Pt arrives to the ED via [facility]4. Upon ems report pt was found unresponsive at her home. Pt was hypotensive and bradycardic. Pt was intubated, and transcutaneously paced prior to arrival. Pt arrives with a 6.0 ETT. Pt identity is initially unknown. Upon arrival pt is unresponsive, intubated. Skin is pale cool and clammy. Once talking to family pt identity is updated, Family states that last known well was yesterday as a neighbor seen her leaving for work. Pt did not make it to work today. Family also states that pt has been very depressed and has a hx of taking \"too many\" pills. Family was at the patient's residence on Sunday and the patient was not acting herself on that day and also admitted to family that she \"took too many pills\" that day as well. Family states that the patient wanted them to leave and leave her alone.       Dat Graves RN  08/21/18 5094

## 2018-08-21 NOTE — CONSULTS
chloride    ROS:   Constitutional Negative for fever and chills   HEENT Negative for ear discharge, ear pain, nosebleed   Eyes Negative for photophobia, pain and discharge   Respiratory Negative for hemoptysis and sputum   Cardiovascular Negative for orthopnea, claudication and PND   Gastrointestinal Negative for abdominal pain, diarrhea, blood in stool   Musculoskeletal Negative for joint pain, negative for myalgia   Skin Negative for rash or itching   Endo/heme/allergies Negative for polydipsia, environmental allergy   Psychiatric Negative for suicidal ideation. Patient is not anxious           Objective:   BP (!) 108/55   Pulse 95   Resp 13   Ht 5' 7\" (1.702 m)   Wt 165 lb 5.5 oz (75 kg)   SpO2 95%   BMI 25.90 kg/m²     Blood pressure range: Systolic (24TVG), PIS:30 , Min:70 , SARI:296   ; Diastolic (20NDZ), MEV:92, Min:44, Max:69      Continuous infusions:    pantoprozole (PROTONIX) infusion 8 mg/hr (08/21/18 1213)    octreotide (SANDOSTATIN) infusion 25 mcg/hr (08/21/18 1351)    amiodarone 450mg/250ml D5W infusion 1 mg/min (08/21/18 1236)    norepinephrine 25 mcg/min (08/21/18 1717)    sodium chloride 100 mL/hr at 08/21/18 1612                    EXAMINATION:        Mental status   Patient is intubated,  not on sedation No spontaneous eye opening.      Cranial nerves   Pupil response: Present , L-pupil 4mm , right pupil 3mm   Corneal Reflex: Present    Oculocephalic reflex: Present    Cough reflex: Present     Gag relfex : present     Motor and sensory function  Patient grimaces to pain painful  stimuli  Flexion withdrawal in bilateral lower extremities   DTR Intact symmetric  Babinski present   Absent   Gait  Not tested          Data:    Lab Results:     Lab Results   Component Value Date    ALT 13 08/21/2018    AST 22 08/21/2018    TSH 0.41 08/21/2018     Hematology:  Recent Labs      08/21/18   1056   WBC  19.7*   HGB  17.5*   HCT  55.2*   PLT  350     Chemistry:  Recent Labs      08/21/18   1053

## 2018-08-21 NOTE — CARE COORDINATION
Case Management Initial Discharge Plan  Dareen Doles,         Readmission Risk              Risk of Unplanned Readmission:        0               Met with:family member patient and sister to discuss discharge plans. Information verified: address, contacts, phone number, , insurance Yes  PCP: Jazmin Rodriguez DO  Date of last visit: not sure    Insurance Provider: paramount adv    Discharge Planning    Living Arrangements:  Alone   Support Systems:  Family Members    Home has  stories   stairs to climb to get into front door, stairs to climb to reach second floor  Location of bedroom/bathroom in home     Patient able to perform ADL's:Independent    Current Services (outpatient & in home) none  DME equipment: none  DME provider:     [facility]   Potential Assistance Purchasing Medications:  No  Does patient want to participate in local refill/ meds to beds program?  No    Potential Assistance Needed:  Other (Comment)    Patient agreeable to home care: No  [place] of choice provided:  n/a    Prior SNF/Rehab Placement and Facility: no  Agreeable to SNF/Rehab: not sure  [place] of choice provided: yes   Evaluation: no    Expected Discharge date:  18  Patient expects to be discharged to:  unsure-home vs snf  Follow Up Appointment: Best Day/ Time:      Transportation provider: self  Transportation arrangements needed for discharge: No    Discharge Plan: Pt intubated. Spoke with pt's sister Sophia Rasheed. She is next of kin. Pt is not , no children, no living parents. No other siblings. Pt is independent. Works as a HHA. Will see how pt progresses. Unsure of discharge needs. Reports pt can be stubborn, unsure if she would be agreeable to a SNF.         Electronically signed by Tiffani Edge RN on 18 at 6:03 PM

## 2018-08-21 NOTE — ED PROVIDER NOTES
[facility]  Emergency Department  Faculty Attestation     I performed a history and physical examination of the patient and discussed management with the resident. I reviewed the residents note and agree with the documented findings and plan of care. Any areas of disagreement are noted on the chart. I was personally present for the key portions of any procedures. I have documented in the chart those procedures where I was not present during the key portions. I have reviewed the emergency nurses triage note. I agree with the chief complaint, past medical history, past surgical history, allergies, medications, social and family history as documented unless otherwise noted below. For Physician Assistant/ Nurse Practitioner cases/documentation I have personally evaluated this patient and have completed at least one if not all key elements of the E/M (history, physical exam, and MDM). Additional findings are as noted. Primary Care Physician:  No primary care provider on file. Screenings:  [unfilled]    CHIEF COMPLAINT       Chief Complaint   Patient presents with    Altered Mental Status     Pt found unresponsive, \"didnt report to work\" unknown LKW       RECENT VITALS:    ,  Pulse: 119, Resp: 23, BP: (!) 110/56    LABS:  Labs Reviewed   URINE CULTURE   CULTURE BLOOD #1   CULTURE BLOOD #1   CBC WITH AUTO DIFFERENTIAL   BASIC METABOLIC PANEL W/ REFLEX TO MG FOR LOW K    TROPONIN   LACTIC ACID   PROTIME-INR   TSH WITHOUT REFLEX   T4   AMMONIA   APTT   POC BLOOD GAS AND CHEMISTRY       Radiology  CT head without contrast    (Results Pending)   XR CHEST PORTABLE    (Results Pending)   CT ABDOMEN PELVIS W IV CONTRAST Additional Contrast? None    (Results Pending)       CRITICAL CARE: There was a high probability of clinically significant/life threatening deterioration in this patient's condition which required my urgent intervention. Total critical care time was 40 minutes. This excludes any time for separately reportable procedures. EKG:  EKG Interpretation    Interpreted by me    Rhythm: normal sinus   Rate:Tachycardia  Axis: normal  Ectopy: none  Conduction: Long QT, intraventricular conduction delay  ST Segments:Depression inferiorly and laterally; poor quality, unable to exclude MARIA GUADALUPE in V1, V2  T Waves: Inversion inferolaterally  Q Waves: none    Clinical Impression: Myocardial ischemia    Attending Physician Additional  Notes    EMS was called by sister. She was notified that this patient did not show up for work today. She was found unresponsive. Unknown regarding any past medical history or medication use. Blood sugar was elevated to 300. She was hypotensive with systolic blood pressure in the 70s. For sedation by first responders was unsuccessful. There may have been blood or vomit or coffee grounds. EMS intubated her orally and have right hand IV which is alone. She is now tachycardic and normotensive. GCS is 3. No signs of head trauma but there is bruising on the left chest abdomen abdomen is distended. EMS suggests there is history of overuse of pain medications however there is no pinpoint pupils and Narcan was not administered. On arrival here she is tachycardic normotensive saturations 99%, tachypneic. Skin is cool and diaphoretic. Pulses are thready. Breath sounds are symmetrical.  Neck is immobilized in a collar. There is disconjugate gaze and pupils are 5 mm and unreactive to light. There is no motor activity. No corneal reflexes. Abdomen is distended but no guarding. Impression is coma rule out intracranial bleed or masses stroke, post-arrest, sepsis, GI bleeding, overdose. Plan is IV fluids, blood cultures, chemistries, EKG, cardiac monitor, antibiotics after cultures, CT head neck abdomen. [facility].  Tiago Pascual MD, [facility]  Attending Emergency  Physician                Yi Rogel MD  08/21/18 1044    A studies show severe metabolic acidosis with lactate of 11, elevated hematocrit suggesting dehydration, pH 7.24, normal potassium. EKG Interpretation    Interpreted by me    Rhythm: normal sinus   Rate: normal  Axis: normal  Ectopy: none  Conduction: Long QTC  ST Segments: Subtle ST depression inferiorly, improved from prior EKG  T Waves: inferior inversion  Q Waves: none    Clinical Impression: no acute changes and normal EKG       Natalia Thapa MD  08/21/18 1100    NG returning 400 ml blood. Protonix and octreotide ordered. GI consulted. Multiple runs of VT on monitor. Amiodarone 150 mg given. Will consult cardiology. CT pending. Will admit to MICU. Natalia Thapa MD  08/21/18 1145       Natalia Thapa MD  08/21/18 1145    Right IJ triple lumen line placed under ultrasound guidance. Levophed started to tritate MAP > 65. IV fluids continued. Pastoral care and family notified; family aware of grave prognosis.      Natalia Thapa MD  08/21/18 2029

## 2018-08-21 NOTE — ED PROVIDER NOTES
perform ROS: Acuity of condition       PHYSICAL EXAM   (up to 7 for level 4, 8 or more for level 5)      INITIAL VITALS:   BP (!) 108/55   Pulse 95   Resp 13   Ht 5' 7\" (1.702 m)   Wt 165 lb 5.5 oz (75 kg)   SpO2 95%   BMI 25.90 kg/m²     Physical Exam   Constitutional:   Unresponsive   HENT:   Head: Normocephalic and atraumatic. Dry blood from nares, status post attempted nasal intubation   Eyes:   Pupils equal, narrow and nonreactive. Neck: No JVD present. Cardiovascular: Intact distal pulses. Tachycardic   Pulmonary/Chest:   Intubated, bilateral breath sounds   Abdominal: Soft. She exhibits no distension. Bruising left flank. Musculoskeletal: She exhibits no deformity. Neurological: She is unresponsive.    Poor reflexes bilaterally   Skin:   Bruising left flank         DIFFERENTIAL  DIAGNOSIS     PLAN (LABS / IMAGING / EKG):  Orders Placed This Encounter   Procedures    CENTRAL LINE    Urine Culture    Culture Blood #1    Culture Blood #1    MRSA DNA Probe, Nasal    CT head without contrast    XR CHEST PORTABLE    CT Cervical Spine WO Contrast    CT ABDOMEN PELVIS WO CONTRAST Additional Contrast? None    XR CHEST PORTABLE    CBC auto differential    Basic Metabolic Panel w/ Reflex to MG    Troponin    TSH without Reflex    T4    AMMONIA    Urine Drug Screen    Urinalysis    Cortisol Total    TOX SCR, BLD, ED    Lipase    Hepatic Function Panel    Hemoglobin and hematocrit, blood    SODIUM (POC)    POTASSIUM (POC)    CHLORIDE (POC)    CALCIUM, IONIC (POC)    Blood gas, arterial    Lactic Acid, Whole Blood    Beta-Hydroxybutyrate    SPECIMEN REJECTION    Magnesium    PREVIOUS SPECIMEN    Basic Metabolic Panel w/ Reflex to MG    Troponin    CBC auto differential    Lactic acid, plasma    HEMOGLOBIN AND HEMATOCRIT, BLOOD    POTASSIUM    MAGNESIUM    Protime-INR    APTT    PREVIOUS SPECIMEN    SPECIMEN REJECTION    CK    Troponin    Diet NPO Effective MDMA, Urine NOT REPORTED NEG    Buprenorphine Urine NOT REPORTED NEG    Test Information       Assay provides medical screening only.   The absence of expected drug(s) and/or   Urinalysis   Result Value Ref Range    Color, UA YELLOW YEL    Turbidity UA CLEAR CLEAR    Glucose, Ur NEGATIVE NEG    Bilirubin Urine NEGATIVE NEG    Ketones, Urine TRACE (A) NEG    Specific Gravity, UA 1.010 1.005 - 1.030    Urine Hgb NEGATIVE NEG    pH, UA 6.0 5.0 - 8.0    Protein, UA NEGATIVE NEG    Urobilinogen, Urine Normal NORM    Nitrite, Urine NEGATIVE NEG    Leukocyte Esterase, Urine NEGATIVE NEG    Urinalysis Comments       Microscopic exam not performed based on chemical results unless requested in   Cortisol Total   Result Value Ref Range    Cortisol 56.5 (H) 2.7 - 18.4 ug/dL    Cortisol Collection Info NOT REPORTED    TOX SCR, BLD, ED   Result Value Ref Range    Ethanol <10 <10 mg/dL    Ethanol percent <7.136 %    Salicylate Lvl <1 (L) 3 - 10 mg/dL    Acetaminophen Level <5 (L) 10 - 30 ug/mL    Toxic Tricyclic Sc,Blood NEGATIVE NEG   Lipase   Result Value Ref Range    Lipase 36 13 - 60 U/L   Hepatic Function Panel   Result Value Ref Range    Alb 4.2 3.5 - 5.2 g/dL    Alkaline Phosphatase 86 35 - 104 U/L    ALT 13 5 - 33 U/L    AST 22 <32 U/L    Total Bilirubin <0.10 (L) 0.3 - 1.2 mg/dL    Bilirubin, Direct <0.08 <0.31 mg/dL    Bilirubin, Indirect CANNOT BE CALCULATED 0.00 - 1.00 mg/dL    Total Protein 7.1 6.4 - 8.3 g/dL    Globulin NOT REPORTED 1.5 - 3.8 g/dL    Albumin/Globulin Ratio 1.4 1.0 - 2.5   Hemoglobin and hematocrit, blood   Result Value Ref Range    POC Hemoglobin 19.0 (H) 12.0 - 16.0 g/dL    POC Hematocrit 56 (H) 36 - 46 %   SODIUM (POC)   Result Value Ref Range    POC Sodium 148 (H) 138 - 146 mmol/L   POTASSIUM (POC)   Result Value Ref Range    POC Potassium 3.1 (L) 3.5 - 4.5 mmol/L   CHLORIDE (POC)   Result Value Ref Range    POC Chloride 111 (H) 98 - 107 mmol/L   CALCIUM, IONIC (POC)   Result Value Ref Range    POC Ionized Calcium 1.01 (L) 1.15 - 1.33 mmol/L   Lactic Acid, Whole Blood   Result Value Ref Range    Lactic Acid, Whole Blood 11.6 (H) 0.7 - 2.1 mmol/L   Beta-Hydroxybutyrate   Result Value Ref Range    Beta-Hydroxybutyrate 0.11 0.02 - 0.27 mmol/L   SPECIMEN REJECTION   Result Value Ref Range    Specimen Source . BLOOD     Ordered Test PT, PTT     Reason for Rejection Unable to perform testing: Specimen clotted. - NOT REPORTED    Magnesium   Result Value Ref Range    Magnesium 2.9 (H) 1.6 - 2.6 mg/dL   Protime-INR   Result Value Ref Range    Protime 11.8 9.0 - 12.0 sec    INR 1.1    APTT   Result Value Ref Range    PTT 23.3 20.5 - 30.5 sec   POCT troponin   Result Value Ref Range    POC Troponin I 0.06 0.00 - 0.10 ng/mL    POC Troponin Interp       The Troponin-I (POC) results cannot be compared to the Troponin-T results.    Venous Blood Gas, POC   Result Value Ref Range    pH, Dwight 7.240 (L) 7.320 - 7.430    pCO2, Dwight 57.6 (H) 41.0 - 51.0 mm Hg    pO2, Dwight 117.2 (H) 30.0 - 50.0 mm Hg    HCO3, Venous 24.7 22.0 - 29.0 mmol/L    Total CO2, Venous 27 23.0 - 30.0 mmol/L    Negative Base Excess, Dwight 4 (H) 0.0 - 2.0    Positive Base Excess, Dwight NOT REPORTED 0.0 - 3.0    O2 Sat, Dwight 98 (H) 60.0 - 85.0 %    O2 Device/Flow/% NOT REPORTED     Rigo Test NOT REPORTED     Sample Site NOT REPORTED     Mode NOT REPORTED     FIO2 NOT REPORTED     Pt Temp NOT REPORTED     POC pH Temp NOT REPORTED     POC pCO2 Temp NOT REPORTED mm Hg    POC pO2 Temp NOT REPORTED mm Hg   Creatinine W/GFR Point of Care   Result Value Ref Range    POC Creatinine 1.31 (H) 0.51 - 1.19 mg/dL    GFR Comment NOT REPORTED >60 mL/min    GFR Non- NOT REPORTED >60 mL/min    GFR Comment         Lactic Acid, POC   Result Value Ref Range    POC Lactic Acid 11.41 (H) 0.56 - 1.39 mmol/L   POCT Glucose   Result Value Ref Range    POC Glucose 303 (H) 74 - 100 mg/dL   Anion Gap (Calc) POC   Result Value Ref Range    Anion Gap 12 7 - 16 mmol/L   Arterial Blood Gas, POC   Result Value Ref Range    POC pH 7.293 (L) 7.350 - 7.450    POC pCO2 44.1 35.0 - 48.0 mm Hg    POC PO2 102.0 83.0 - 108.0 mm Hg    POC HCO3 21.4 21.0 - 28.0 mmol/L    TCO2 (calc), Art 23 22.0 - 29.0 mmol/L    Negative Base Excess, Art 5 (H) 0.0 - 2.0    Positive Base Excess, Art NOT REPORTED 0.0 - 3.0    POC O2 SAT 97 94.0 - 98.0 %    O2 Device/Flow/% NOT REPORTED     Rigo Test NOT REPORTED     Sample Site NOT REPORTED     Mode NOT REPORTED     FIO2 NOT REPORTED     Pt Temp NOT REPORTED     POC pH Temp NOT REPORTED     POC pCO2 Temp NOT REPORTED mm Hg    POC pO2 Temp NOT REPORTED mm Hg   Lactic Acid, POC   Result Value Ref Range    POC Lactic Acid 8.32 (H) 0.56 - 1.39 mmol/L   POCT troponin   Result Value Ref Range    POC Troponin I 0.08 0.00 - 0.10 ng/mL    POC Troponin Interp       The Troponin-I (POC) results cannot be compared to the Troponin-T results.    POC Glucose Fingerstick   Result Value Ref Range    POC Glucose 226 (H) 65 - 105 mg/dL   EKG 12 lead   Result Value Ref Range    Ventricular Rate 117 BPM    Atrial Rate 117 BPM    P-R Interval 152 ms    QRS Duration 100 ms    Q-T Interval 408 ms    QTc Calculation (Bazett) 569 ms    R Axis 55 degrees    T Axis 69 degrees   TYPE AND SCREEN   Result Value Ref Range    Expiration Date 08/24/2018     Arm Band Number [id number]     ABO/Rh A POSITIVE     Antibody Screen NEGATIVE     Unit Number [id number]     Product Code Leukocyte Reduced Red Cell     Unit Divison 0     Dispense Status ALLOCATED     Transfusion Status OK TO TRANSFUSE     Crossmatch Result COMPATIBLE     Unit Number [id number]     Product Code Leukocyte Reduced Red Cell     Unit Divison 0     Dispense Status ALLOCATED     Transfusion Status OK TO TRANSFUSE     Crossmatch Result COMPATIBLE            RADIOLOGY:  \Ct Abdomen Pelvis Wo Contrast Additional Contrast? None    Result Date: 8/21/2018  EXAMINATION: CT OF THE ABDOMEN AND PELVIS WITHOUT CONTRAST 8/21/2018 12:08 pm TECHNIQUE: CT of the abdomen and pelvis was performed without the administration of intravenous contrast. Multiplanar reformatted images are provided for review. Dose modulation, iterative reconstruction, and/or weight based adjustment of the mA/kV was utilized to reduce the radiation dose to as low as reasonably achievable. COMPARISON: None. HISTORY: ORDERING SYSTEM PROVIDED HISTORY: found down TECHNOLOGIST PROVIDED HISTORY: FINDINGS: Cardiovascular: Unremarkable Lower Chest: Consolidation involving the right lower lobe is identified. Pattern appears patchy and somewhat nodular. Organs Liver:  Unremarkable Gallbladder:  Unremarkable Biliary: Unremarkable Pancreas: Unremarkable Spleen: Unremarkable Adrenals:  Unremarkable Kidneys: 16 mm right renal cyst. Pelvis Bladder: Marshall catheter within the urinary bladder and air. Reproductive: Retroverted uterus. GI/ Bowel: Diverticulosis. No wall thickening or regional inflammation. Unremarkable appendix. Stomach and proximal small bowel appear unremarkable. Peritoneum/ Retroperitoneum: No adenopathy, free air or free fluid Bones/ Soft Tissues: Osteitis pubis     1. Diverticulosis. 2. No adenopathy, free air or free fluid. 3. Airspace consolidation in the right base. Given provided history, aspiration may be considered. Ct Head Without Contrast    Result Date: 8/21/2018  EXAMINATION: CT OF THE HEAD WITHOUT CONTRAST  8/21/2018 11:58 am TECHNIQUE: CT of the head was performed without the administration of intravenous contrast. Dose modulation, iterative reconstruction, and/or weight based adjustment of the mA/kV was utilized to reduce the radiation dose to as low as reasonably achievable. COMPARISON: None. HISTORY: ORDERING SYSTEM PROVIDED HISTORY: unresponsive TECHNOLOGIST PROVIDED HISTORY: FINDINGS: CT HEAD: BRAIN/VENTRICLES: There is no acute intracranial hemorrhage, mass effect or midline shift. No abnormal extra-axial fluid collection.   The gray-white differentiation is maintained without evidence of an acute infarct. There is no evidence of hydrocephalus. ORBITS: The visualized portion of the orbits demonstrate no acute abnormality. SINUSES: The visualized paranasal sinuses and mastoid air cells demonstrate no acute abnormality. SOFT TISSUES/SKULL:  No acute abnormality of the visualized skull or soft tissues. Incompletely visualized endotracheal and enteric tubes identified     No CT evidence for acute intracranial abnormality. Ct Cervical Spine Wo Contrast    Result Date: 8/21/2018  EXAMINATION: CT OF THE CERVICAL SPINE WITHOUT CONTRAST 8/21/2018 11:58 am TECHNIQUE: CT of the cervical spine was performed without the administration of intravenous contrast. Multiplanar reformatted images are provided for review. Dose modulation, iterative reconstruction, and/or weight based adjustment of the mA/kV was utilized to reduce the radiation dose to as low as reasonably achievable. COMPARISON: None. HISTORY: ORDERING SYSTEM PROVIDED HISTORY: possible trauma, unresponsive FINDINGS: BONES/ALIGNMENT: There is no evidence of an acute cervical spine fracture. There is normal alignment of the cervical spine. DEGENERATIVE CHANGES: Mild-to-moderate degenerative change. SOFT TISSUES: There is no prevertebral soft tissue swelling. Dense calcification related to the right lobe of the thyroid gland. No acute abnormality of the cervical spine. Moderate degenerative change. Right thyroid lobe calcification. Xr Chest Portable    Result Date: 8/21/2018  EXAMINATION: SINGLE XRAY VIEW OF THE CHEST 8/21/2018 1:49 pm COMPARISON: None HISTORY: ORDERING SYSTEM PROVIDED HISTORY: s/p Right IJ TECHNOLOGIST PROVIDED HISTORY: s/p Right IJ FINDINGS: There is a right internal jugular vein catheter with the tip in the SVC. No pneumothorax is evident. Endotracheal tube and nasogastric tube are in satisfactory position. Moderate right mid lung airspace disease is identified. remained unresponsive and observation during her course in the emergency department. Attending physician discussed with GI, no immediate plan for EGD, plan for scope following stabilization. Discussed with cardiology recommending trending troponins at this time. Patient received several liters of normal saline, blood pressures continue to drop requiring initiation of Levothroid. Patient responding well on Levaquin for confusion. Family arrives to our department, reports that patient has been using increasing doses of benzodiazepines which she takes secondary to restless leg syndrome. PROCEDURES:  Central Line  Date/Time: 8/21/2018 5:24 PM  Performed by: Syed Tejeda by: Aruna Frank     Consent:     Consent obtained:  Emergent situation  Pre-procedure details:     Sterile barrier technique: All elements of maximal sterile technique followed      Skin preparation:  2% chlorhexidine    Skin preparation agent: Skin preparation agent completely dried prior to procedure    Anesthesia (see MAR for exact dosages): Anesthesia method:  Local infiltration    Local anesthetic:  Lidocaine 1% w/o epi  Procedure details:     Location:  R internal jugular    Patient position:  Flat    Procedural supplies:  Triple lumen    Catheter size:  7 Fr    Landmarks identified: yes      Ultrasound guidance: yes      Sterile ultrasound techniques: Sterile gel and sterile probe covers were used      Number of attempts:  1    Successful placement: yes    Post-procedure details:     Post-procedure:  Dressing applied and line sutured    Assessment:  Blood return through all ports, no pneumothorax on x-ray and placement verified by x-ray    Patient tolerance of procedure:   Tolerated well, no immediate complications      Ultrasound guided IV line   Ultrasound technician Dr. Nevin Salazar reader Dr. Rene Adams  Right anterior jugular vein identified on ultrasound  Guidewire was placed and visualized in

## 2018-08-22 ENCOUNTER — APPOINTMENT (OUTPATIENT)
Dept: GENERAL RADIOLOGY | Age: 63
DRG: 133 | End: 2018-08-22
Payer: MEDICARE

## 2018-08-22 PROBLEM — E87.0 HYPERNATREMIA: Status: ACTIVE | Noted: 2018-08-22

## 2018-08-22 PROBLEM — R57.1 HYPOVOLEMIC SHOCK (HCC): Status: RESOLVED | Noted: 2018-08-21 | Resolved: 2018-08-22

## 2018-08-22 LAB
ABSOLUTE EOS #: 0 K/UL (ref 0–0.4)
ABSOLUTE IMMATURE GRANULOCYTE: 0 K/UL (ref 0–0.3)
ABSOLUTE LYMPH #: 1.91 K/UL (ref 1–4.8)
ABSOLUTE MONO #: 1.27 K/UL (ref 0.1–0.8)
ALBUMIN SERPL-MCNC: 3.3 G/DL (ref 3.5–5.2)
ALBUMIN/GLOBULIN RATIO: 1.4 (ref 1–2.5)
ALLEN TEST: NORMAL
ALP BLD-CCNC: 66 U/L (ref 35–104)
ALT SERPL-CCNC: 15 U/L (ref 5–33)
ANION GAP SERPL CALCULATED.3IONS-SCNC: 12 MMOL/L (ref 9–17)
AST SERPL-CCNC: 28 U/L
BASOPHILS # BLD: 0 % (ref 0–2)
BASOPHILS ABSOLUTE: 0 K/UL (ref 0–0.2)
BILIRUB SERPL-MCNC: <0.1 MG/DL (ref 0.3–1.2)
BILIRUBIN DIRECT: <0.08 MG/DL
BILIRUBIN, INDIRECT: ABNORMAL MG/DL (ref 0–1)
BUN BLDV-MCNC: 13 MG/DL (ref 8–23)
BUN/CREAT BLD: ABNORMAL (ref 9–20)
C-REACTIVE PROTEIN: 122.4 MG/L (ref 0–5)
CALCIUM SERPL-MCNC: 6.9 MG/DL (ref 8.6–10.4)
CHLORIDE BLD-SCNC: 112 MMOL/L (ref 98–107)
CO2: 22 MMOL/L (ref 20–31)
CREAT SERPL-MCNC: 1.6 MG/DL (ref 0.5–0.9)
CULTURE: NO GROWTH
CULTURE: NORMAL
DIFFERENTIAL TYPE: ABNORMAL
DIRECT EXAM: NORMAL
EKG ATRIAL RATE: 117 BPM
EKG P-R INTERVAL: 152 MS
EKG Q-T INTERVAL: 408 MS
EKG QRS DURATION: 100 MS
EKG QTC CALCULATION (BAZETT): 569 MS
EKG R AXIS: 55 DEGREES
EKG T AXIS: 69 DEGREES
EKG VENTRICULAR RATE: 117 BPM
EOSINOPHILS RELATIVE PERCENT: 0 % (ref 1–4)
ESTIMATED AVERAGE GLUCOSE: 114 MG/DL
FIO2: 60
GFR AFRICAN AMERICAN: 40 ML/MIN
GFR NON-AFRICAN AMERICAN: 33 ML/MIN
GFR SERPL CREATININE-BSD FRML MDRD: ABNORMAL ML/MIN/{1.73_M2}
GFR SERPL CREATININE-BSD FRML MDRD: ABNORMAL ML/MIN/{1.73_M2}
GLOBULIN: ABNORMAL G/DL (ref 1.5–3.8)
GLUCOSE BLD-MCNC: 145 MG/DL (ref 65–105)
GLUCOSE BLD-MCNC: 200 MG/DL (ref 70–99)
GLUCOSE BLD-MCNC: 203 MG/DL (ref 74–100)
HBA1C MFR BLD: 5.6 % (ref 4–6)
HCT VFR BLD CALC: 42.8 % (ref 36.3–47.1)
HCT VFR BLD CALC: 46.3 % (ref 36.3–47.1)
HCT VFR BLD CALC: 48.7 % (ref 36.3–47.1)
HEMOGLOBIN: 14.4 G/DL (ref 11.9–15.1)
HEMOGLOBIN: 15.2 G/DL (ref 11.9–15.1)
HEMOGLOBIN: 16.2 G/DL (ref 11.9–15.1)
IMMATURE GRANULOCYTES: 0 %
LACTIC ACID, WHOLE BLOOD: 3.7 MMOL/L (ref 0.7–2.1)
LV EF: 58 %
LVEF MODALITY: NORMAL
LYMPHOCYTES # BLD: 6 % (ref 24–44)
Lab: NORMAL
MCH RBC QN AUTO: 31 PG (ref 25.2–33.5)
MCHC RBC AUTO-ENTMCNC: 32.8 G/DL (ref 28.4–34.8)
MCV RBC AUTO: 94.5 FL (ref 82.6–102.9)
MODE: NORMAL
MONOCYTES # BLD: 4 % (ref 1–7)
MORPHOLOGY: ABNORMAL
MRSA, DNA, NASAL: NORMAL
NEGATIVE BASE EXCESS, ART: 1 (ref 0–2)
NRBC AUTOMATED: 0 PER 100 WBC
O2 DEVICE/FLOW/%: NORMAL
PATIENT TEMP: NORMAL
PDW BLD-RTO: 14.8 % (ref 11.8–14.4)
PLATELET # BLD: 292 K/UL (ref 138–453)
PLATELET ESTIMATE: ABNORMAL
PMV BLD AUTO: 10.1 FL (ref 8.1–13.5)
POC HCO3: 23.8 MMOL/L (ref 21–28)
POC O2 SATURATION: 96 % (ref 94–98)
POC PCO2 TEMP: NORMAL MM HG
POC PCO2: 38 MM HG (ref 35–48)
POC PH TEMP: NORMAL
POC PH: 7.41 (ref 7.35–7.45)
POC PO2 TEMP: NORMAL MM HG
POC PO2: 83.6 MM HG (ref 83–108)
POSITIVE BASE EXCESS, ART: NORMAL (ref 0–3)
POTASSIUM SERPL-SCNC: 4.3 MMOL/L (ref 3.7–5.3)
RBC # BLD: 4.9 M/UL (ref 3.95–5.11)
RBC # BLD: ABNORMAL 10*6/UL
SAMPLE SITE: NORMAL
SEG NEUTROPHILS: 90 % (ref 36–66)
SEGMENTED NEUTROPHILS ABSOLUTE COUNT: 28.62 K/UL (ref 1.8–7.7)
SODIUM BLD-SCNC: 146 MMOL/L (ref 135–144)
SPECIMEN DESCRIPTION: NORMAL
STATUS: NORMAL
TCO2 (CALC), ART: 25 MMOL/L (ref 22–29)
TOTAL PROTEIN: 5.7 G/DL (ref 6.4–8.3)
TROPONIN INTERP: NORMAL
TROPONIN T: <0.03 NG/ML
WBC # BLD: 31.8 K/UL (ref 3.5–11.3)
WBC # BLD: ABNORMAL 10*3/UL

## 2018-08-22 PROCEDURE — 94762 N-INVAS EAR/PLS OXIMTRY CONT: CPT

## 2018-08-22 PROCEDURE — 94770 HC ETCO2 MONITOR DAILY: CPT

## 2018-08-22 PROCEDURE — 83605 ASSAY OF LACTIC ACID: CPT

## 2018-08-22 PROCEDURE — 6360000002 HC RX W HCPCS: Performed by: EMERGENCY MEDICINE

## 2018-08-22 PROCEDURE — 6360000002 HC RX W HCPCS: Performed by: INTERNAL MEDICINE

## 2018-08-22 PROCEDURE — 86140 C-REACTIVE PROTEIN: CPT

## 2018-08-22 PROCEDURE — 6360000002 HC RX W HCPCS: Performed by: STUDENT IN AN ORGANIZED HEALTH CARE EDUCATION/TRAINING PROGRAM

## 2018-08-22 PROCEDURE — 87070 CULTURE OTHR SPECIMN AEROBIC: CPT

## 2018-08-22 PROCEDURE — 85025 COMPLETE CBC W/AUTO DIFF WBC: CPT

## 2018-08-22 PROCEDURE — 82803 BLOOD GASES ANY COMBINATION: CPT

## 2018-08-22 PROCEDURE — 82947 ASSAY GLUCOSE BLOOD QUANT: CPT

## 2018-08-22 PROCEDURE — 99233 SBSQ HOSP IP/OBS HIGH 50: CPT | Performed by: PSYCHIATRY & NEUROLOGY

## 2018-08-22 PROCEDURE — 2580000003 HC RX 258: Performed by: EMERGENCY MEDICINE

## 2018-08-22 PROCEDURE — 89220 SPUTUM SPECIMEN COLLECTION: CPT

## 2018-08-22 PROCEDURE — 6370000000 HC RX 637 (ALT 250 FOR IP): Performed by: STUDENT IN AN ORGANIZED HEALTH CARE EDUCATION/TRAINING PROGRAM

## 2018-08-22 PROCEDURE — 94003 VENT MGMT INPAT SUBQ DAY: CPT

## 2018-08-22 PROCEDURE — 6360000002 HC RX W HCPCS: Performed by: HOSPITALIST

## 2018-08-22 PROCEDURE — 83036 HEMOGLOBIN GLYCOSYLATED A1C: CPT

## 2018-08-22 PROCEDURE — 99291 CRITICAL CARE FIRST HOUR: CPT | Performed by: INTERNAL MEDICINE

## 2018-08-22 PROCEDURE — 93306 TTE W/DOPPLER COMPLETE: CPT

## 2018-08-22 PROCEDURE — 85018 HEMOGLOBIN: CPT

## 2018-08-22 PROCEDURE — 2580000003 HC RX 258: Performed by: HOSPITALIST

## 2018-08-22 PROCEDURE — 85014 HEMATOCRIT: CPT

## 2018-08-22 PROCEDURE — 80076 HEPATIC FUNCTION PANEL: CPT

## 2018-08-22 PROCEDURE — 71045 X-RAY EXAM CHEST 1 VIEW: CPT

## 2018-08-22 PROCEDURE — 99211 OFF/OP EST MAY X REQ PHY/QHP: CPT

## 2018-08-22 PROCEDURE — 80048 BASIC METABOLIC PNL TOTAL CA: CPT

## 2018-08-22 PROCEDURE — 36415 COLL VENOUS BLD VENIPUNCTURE: CPT

## 2018-08-22 PROCEDURE — 6360000002 HC RX W HCPCS

## 2018-08-22 PROCEDURE — 84484 ASSAY OF TROPONIN QUANT: CPT

## 2018-08-22 PROCEDURE — 2000000000 HC ICU R&B

## 2018-08-22 PROCEDURE — 87205 SMEAR GRAM STAIN: CPT

## 2018-08-22 PROCEDURE — 2580000003 HC RX 258: Performed by: STUDENT IN AN ORGANIZED HEALTH CARE EDUCATION/TRAINING PROGRAM

## 2018-08-22 RX ORDER — UREA 10 %
2 LOTION (ML) TOPICAL NIGHTLY PRN
Status: DISCONTINUED | OUTPATIENT
Start: 2018-08-22 | End: 2018-08-31 | Stop reason: HOSPADM

## 2018-08-22 RX ORDER — LORAZEPAM 2 MG/ML
1 INJECTION INTRAMUSCULAR ONCE
Status: COMPLETED | OUTPATIENT
Start: 2018-08-22 | End: 2018-08-22

## 2018-08-22 RX ORDER — HALOPERIDOL 5 MG/ML
1 INJECTION INTRAMUSCULAR ONCE
Status: COMPLETED | OUTPATIENT
Start: 2018-08-22 | End: 2018-08-22

## 2018-08-22 RX ORDER — ROPINIROLE 1 MG/1
1 TABLET, FILM COATED ORAL 2 TIMES DAILY
Status: DISCONTINUED | OUTPATIENT
Start: 2018-08-22 | End: 2018-08-23

## 2018-08-22 RX ORDER — HEPARIN SODIUM 5000 [USP'U]/ML
5000 INJECTION, SOLUTION INTRAVENOUS; SUBCUTANEOUS EVERY 8 HOURS SCHEDULED
Status: DISCONTINUED | OUTPATIENT
Start: 2018-08-22 | End: 2018-08-22

## 2018-08-22 RX ORDER — SODIUM CHLORIDE 450 MG/100ML
INJECTION, SOLUTION INTRAVENOUS CONTINUOUS
Status: DISCONTINUED | OUTPATIENT
Start: 2018-08-22 | End: 2018-08-30

## 2018-08-22 RX ORDER — 0.9 % SODIUM CHLORIDE 0.9 %
1000 INTRAVENOUS SOLUTION INTRAVENOUS ONCE
Status: COMPLETED | OUTPATIENT
Start: 2018-08-22 | End: 2018-08-22

## 2018-08-22 RX ORDER — HALOPERIDOL 5 MG/ML
INJECTION INTRAMUSCULAR
Status: COMPLETED
Start: 2018-08-22 | End: 2018-08-22

## 2018-08-22 RX ORDER — PREGABALIN 75 MG/1
150 CAPSULE ORAL 2 TIMES DAILY
Status: DISCONTINUED | OUTPATIENT
Start: 2018-08-22 | End: 2018-08-22

## 2018-08-22 RX ORDER — PREGABALIN 75 MG/1
150 CAPSULE ORAL 2 TIMES DAILY
Status: DISCONTINUED | OUTPATIENT
Start: 2018-08-22 | End: 2018-08-31 | Stop reason: HOSPADM

## 2018-08-22 RX ORDER — ROPINIROLE 1 MG/1
1 TABLET, FILM COATED ORAL 2 TIMES DAILY
Status: DISCONTINUED | OUTPATIENT
Start: 2018-08-22 | End: 2018-08-22

## 2018-08-22 RX ORDER — PANTOPRAZOLE SODIUM 40 MG/10ML
40 INJECTION, POWDER, LYOPHILIZED, FOR SOLUTION INTRAVENOUS DAILY
Status: DISCONTINUED | OUTPATIENT
Start: 2018-08-23 | End: 2018-08-24

## 2018-08-22 RX ORDER — QUETIAPINE FUMARATE 25 MG/1
25 TABLET, FILM COATED ORAL ONCE
Status: COMPLETED | OUTPATIENT
Start: 2018-08-22 | End: 2018-08-22

## 2018-08-22 RX ADMIN — SODIUM CHLORIDE 3 G: 900 INJECTION INTRAVENOUS at 08:16

## 2018-08-22 RX ADMIN — SODIUM CHLORIDE 3 G: 900 INJECTION INTRAVENOUS at 14:41

## 2018-08-22 RX ADMIN — FENTANYL CITRATE 50 MCG: 50 INJECTION, SOLUTION INTRAMUSCULAR; INTRAVENOUS at 02:53

## 2018-08-22 RX ADMIN — FENTANYL CITRATE 50 MCG: 50 INJECTION, SOLUTION INTRAMUSCULAR; INTRAVENOUS at 23:51

## 2018-08-22 RX ADMIN — PREGABALIN 150 MG: 75 CAPSULE ORAL at 18:46

## 2018-08-22 RX ADMIN — Medication 10 ML: at 12:25

## 2018-08-22 RX ADMIN — FENTANYL CITRATE 50 MCG: 50 INJECTION, SOLUTION INTRAMUSCULAR; INTRAVENOUS at 14:58

## 2018-08-22 RX ADMIN — SODIUM CHLORIDE 3 G: 900 INJECTION INTRAVENOUS at 01:12

## 2018-08-22 RX ADMIN — INSULIN LISPRO 1 UNITS: 100 INJECTION, SOLUTION INTRAVENOUS; SUBCUTANEOUS at 10:29

## 2018-08-22 RX ADMIN — INSULIN LISPRO 2 UNITS: 100 INJECTION, SOLUTION INTRAVENOUS; SUBCUTANEOUS at 04:45

## 2018-08-22 RX ADMIN — Medication 10 ML: at 08:18

## 2018-08-22 RX ADMIN — CALCIUM GLUCONATE 2 G: 98 INJECTION, SOLUTION INTRAVENOUS at 15:24

## 2018-08-22 RX ADMIN — HALOPERIDOL 1 MG: 5 INJECTION INTRAMUSCULAR at 23:23

## 2018-08-22 RX ADMIN — Medication 2 MG: at 21:20

## 2018-08-22 RX ADMIN — FENTANYL CITRATE 50 MCG: 50 INJECTION, SOLUTION INTRAMUSCULAR; INTRAVENOUS at 03:58

## 2018-08-22 RX ADMIN — Medication 10 ML: at 14:41

## 2018-08-22 RX ADMIN — FENTANYL CITRATE 50 MCG: 50 INJECTION, SOLUTION INTRAMUSCULAR; INTRAVENOUS at 08:52

## 2018-08-22 RX ADMIN — OCTREOTIDE ACETATE 25 MCG/HR: 500 INJECTION, SOLUTION INTRAVENOUS; SUBCUTANEOUS at 00:02

## 2018-08-22 RX ADMIN — ROPINIROLE HYDROCHLORIDE 1 MG: 1 TABLET, FILM COATED ORAL at 18:46

## 2018-08-22 RX ADMIN — FENTANYL CITRATE 50 MCG: 50 INJECTION, SOLUTION INTRAMUSCULAR; INTRAVENOUS at 21:20

## 2018-08-22 RX ADMIN — Medication 10 ML: at 12:26

## 2018-08-22 RX ADMIN — Medication 10 ML: at 20:48

## 2018-08-22 RX ADMIN — SODIUM CHLORIDE: 4.5 INJECTION, SOLUTION INTRAVENOUS at 13:21

## 2018-08-22 RX ADMIN — FENTANYL CITRATE 50 MCG: 50 INJECTION, SOLUTION INTRAMUSCULAR; INTRAVENOUS at 13:06

## 2018-08-22 RX ADMIN — QUETIAPINE FUMARATE 25 MG: 25 TABLET ORAL at 21:20

## 2018-08-22 RX ADMIN — SODIUM CHLORIDE 3 G: 900 INJECTION INTRAVENOUS at 20:48

## 2018-08-22 RX ADMIN — LORAZEPAM 1 MG: 2 INJECTION INTRAMUSCULAR; INTRAVENOUS at 15:46

## 2018-08-22 RX ADMIN — FENTANYL CITRATE 50 MCG: 50 INJECTION, SOLUTION INTRAMUSCULAR; INTRAVENOUS at 05:13

## 2018-08-22 RX ADMIN — HALOPERIDOL LACTATE 1 MG: 5 INJECTION, SOLUTION INTRAMUSCULAR at 23:23

## 2018-08-22 RX ADMIN — SODIUM CHLORIDE 1000 ML: 9 INJECTION, SOLUTION INTRAVENOUS at 04:04

## 2018-08-22 ASSESSMENT — PULMONARY FUNCTION TESTS
PIF_VALUE: 14
PIF_VALUE: 19
PIF_VALUE: 13

## 2018-08-22 NOTE — PROGRESS NOTES
WEAN PER PROTOCOL:  [] No   [x] Yes  [] N/A    DISCONTINUE ANY LABS:   [] No   [x] Yes    ICU PROPHYLAXIS:  Stress ulcer:  [x] PPI Agent  [] [id number] [] Sucralfate  [] Other:  VTE:   [] Enoxaparin  [] Unfract. Heparin Subcut  [x] EPC Cuffs    NUTRITION:  [] NPO [] Tube Feeding (Specify: ) [] TPN  [] PO (Diet: DIET TUBE FEED CONTINUOUS/CYCLIC NPO; STANDARD WITH FIBER; Nasogastric; Continuous)    HOME MEDICATIONS RECONCILED: [x] No  [] Yes    INSULIN DRIP:   [x] No   [] Yes    CONSULTATION NEEDED:  [] No   [x] Yes    FAMILY UPDATED:    [] No   [x] Yes    TRANSFER OUT OF ICU:   [x] No   [] Yes    ADDITIONAL PLAN:    1. Metabolic encephalopathy possibly sec to elevated ammonia levels. Resolved. 2. Acute hypoxic hypercapnic respiratory failure requiring intubation. Is awake and following commands , wean of and extubate today. 3. KATELYNN improving will continue , hypernatremia Na 146 IV fluids at 1/2  ml/hr. 4. Hb stable at 15.5 less likely GI bleed , evaluated by GI, PPI gtt and octreotide d'karen. On PPI IV QD.   5. Elevated white count,  and normal platelet count, blood cultures negative. Possible aspiration will continue Unasyn, f/u respiratory culture. 6. NSVT , currently in NSR amiodarone discontinued. Pending 2 D ECHO. 7. Neurology following along, no interventions at this time. 8. EPC cuffs for DVT prophylaxis. Kayla Flores M.D.              Department of Internal Medicine/ Critical care  [facility]             8/22/2018, 1:22 PM

## 2018-08-22 NOTE — PROGRESS NOTES
Patient continues to cough over vent regardless of suctioning. 100 of fentanyl was given without result. RN spoke to critical care. Versed gtt started.     Jazmin Led

## 2018-08-22 NOTE — PROGRESS NOTES
Nutrition Assessment    Type and Reason for Visit: Initial, Consult, Positive Nutrition Screen (decreased appetite, wounds, TF recommendations d/t vent)    Nutrition Recommendations: Start nutrition as able. If TF needed, suggest Diabetic formula with goal rate of 65 ml/hr to provide 1872 kcal and 94 g pro per day. Will follow/monitor plans. Malnutrition Assessment:  · Malnutrition Status: Insufficient data    Nutrition Diagnosis:   · Problem: Inadequate oral intake  · Etiology: related to Impaired respiratory function-inability to consume food     Signs and symptoms:  as evidenced by NPO status due to medical condition    Nutrition Assessment:  · Subjective Assessment: Pt on vent. Unable to obtain nutritional information/history from pt at this time. · Wound Type: scattered abrasions, left thigh and buttocks deep tissue injury, skin tears   · Current Nutrition Therapies:  · Oral Diet Orders: NPO   · Nutrition Support Orders: None  · Anthropometric Measures:  · Ht: 5' 7\" (170.2 cm)   · Current Body Wt: 205 lb 14.6 oz (93.4 kg)  · Ideal Body Wt: 135 lb (61.2 kg), % Ideal Body 153%  · BMI Classification: BMI 30.0 - 34.9 Obese Class I  · Comparative Standards (Estimated Nutrition Needs):  · Estimated Daily Total Kcal: 2402-8473 kcal/day  · Estimated Daily Protein (g):  g pro/day     Estimated Intake vs Estimated Needs: Intake Less Than Needs    Nutrition Risk Level: High    Nutrition Interventions:   Start nutrition as able. If TF needed, suggest Diabetic formula with goal rate of 65 ml/hr to provide 1872 kcal and 94 g pro per day. Continued Inpatient Monitoring, Education Not Indicated    Nutrition Evaluation:   · Evaluation: Goals set   · Goals: meet % of estimated nutrition needs     · Monitoring: NPO Status, Weight, Comparative Standards, Pertinent Labs    See Adult Nutrition Doc Flowsheet for more detail.      Electronically signed by Angelita Ball RD, MALU on 8/22/18 at 2:31 PM    Contact Number: [phone]

## 2018-08-22 NOTE — PLAN OF CARE
Problem: OXYGENATION/RESPIRATORY FUNCTION  Goal: Patient will maintain patent airway  Outcome: Ongoing    Goal: Patient will achieve/maintain normal respiratory rate/effort  Respiratory rate and effort will be within normal limits for the patient   Outcome: Ongoing      Problem: MECHANICAL VENTILATION  Goal: Patient will maintain patent airway  Outcome: Ongoing    Goal: Oral health is maintained or improved  Outcome: Ongoing    Goal: ET tube will be managed safely  Outcome: Ongoing    Goal: Mobility/activity is maintained at optimum level for patient  Outcome: Ongoing      Problem: SKIN INTEGRITY  Goal: Skin integrity is maintained or improved  Outcome: Ongoing      Problem: Falls - Risk of:  Goal: Will remain free from falls  Will remain free from falls   Outcome: Ongoing    Goal: Absence of physical injury  Absence of physical injury   Outcome: Ongoing      Problem: Risk for Impaired Skin Integrity  Goal: Tissue integrity - skin and mucous membranes  Structural intactness and normal physiological function of skin and  mucous membranes.    Outcome: Ongoing

## 2018-08-22 NOTE — PROGRESS NOTES
Interval Follow-Up Note     Subjective:  Main problem:   ## Coffee Ground Emesis  ## Cardiogenic shock  ## Altered Mental Status    OBJECTIVE:   BP (!) 111/45   Pulse 93   Temp 99.7 °F (37.6 °C) (Oral)   Resp 19   Ht 5' 7\" (1.702 m)   Wt 205 lb 14.6 oz (93.4 kg)   SpO2 95%   BMI 32.25 kg/m²   Body mass index is 32.25 kg/m².         Lab Results   Component Value Date    WBC 31.8 (HH) 08/22/2018    HGB 15.2 (H) 08/22/2018    HCT 46.3 08/22/2018    MCV 94.5 08/22/2018     08/22/2018     (H) 08/22/2018    K 4.3 08/22/2018     (H) 08/22/2018    CO2 22 08/22/2018    BUN 13 08/22/2018    CREATININE 1.60 (H) 08/22/2018    LABALBU 4.2 08/21/2018    BILITOT <0.10 (L) 08/21/2018    ALKPHOS 86 08/21/2018    AST 22 08/21/2018    ALT 13 08/21/2018      Lab Results   Component Value Date    RBC 4.90 08/22/2018    HGB 15.2 (H) 08/22/2018    MCV 94.5 08/22/2018    MCH 31.0 08/22/2018    MCHC 32.8 08/22/2018    RDW 14.8 (H) 08/22/2018    MPV 10.1 08/22/2018    BASOPCT 0 08/22/2018    LYMPHSABS 1.91 08/22/2018    MONOSABS 1.27 (H) 08/22/2018    NEUTROABS 28.62 (H) 08/22/2018    EOSABS 0.00 08/22/2018    BASOSABS 0.00 08/22/2018        Past Medical History:   Diagnosis Date    Degenerative disc disease, lumbar 05/2018          Current Facility-Administered Medications:     pantoprazole (PROTONIX) 80 mg in sodium chloride 0.9 % 100 mL infusion, 8 mg/hr, Intravenous, Continuous, Burt Stanton MD, Last Rate: 10 mL/hr at 08/21/18 2313, 8 mg/hr at 08/21/18 2313    octreotide (SANDOSTATIN) 500 mcg in sodium chloride 0.9 % 100 mL infusion, 25 mcg/hr, Intravenous, Continuous, Burt Stanton MD, Last Rate: 5 mL/hr at 08/22/18 0002, 25 mcg/hr at 08/22/18 0002    amiodarone (CORDARONE) 450 mg in dextrose 5 % 250 mL infusion, 1 mg/min, Intravenous, Continuous, Rosy Blackwell DO, Last Rate: 33.3 mL/hr at 08/21/18 2313, 1 mg/min at 08/21/18 2313    norepinephrine (LEVOPHED) 16 mg in dextrose 5 % 250 mL infusion, 2 mcg/min, Intravenous, Continuous, Abilio Saez MD, Last Rate: 3.8 mL/hr at 08/22/18 0840, 4 mcg/min at 08/22/18 0840    sodium chloride flush 0.9 % injection 10 mL, 10 mL, Intravenous, 2 times per day, Aleah Jones MD, 10 mL at 08/22/18 0818    sodium chloride flush 0.9 % injection 10 mL, 10 mL, Intravenous, PRN, Aleah Jones MD, 10 mL at 08/21/18 1746    magnesium hydroxide (MILK OF MAGNESIA) 400 MG/5ML suspension 30 mL, 30 mL, Oral, Daily PRN, Aleah Jones MD    ondansetron (ZOFRAN) injection 4 mg, 4 mg, Intravenous, Q6H PRN, Aleah Jones MD    0.9 % sodium chloride infusion, , Intravenous, Continuous, Aleah Jones MD, Last Rate: 100 mL/hr at 08/21/18 1757    ampicillin-sulbactam (UNASYN) 3 g ivpb minibag, 3 g, Intravenous, Q8H, Aleah Jones MD, Stopped at 08/22/18 0846    potassium chloride 20 mEq/50 mL IVPB (Central Line), 20 mEq, Intravenous, PRN, Aleah Jones MD, Last Rate: 50 mL/hr at 08/21/18 1903, 20 mEq at 08/21/18 1903    vasopressin 20 Units in dextrose 5 % 100 mL infusion, 0.04 Units/min, Intravenous, Continuous, Abilio Saez MD    glucose (GLUTOSE) 40 % oral gel 15 g, 15 g, Oral, PRN, Abilio Saez MD    dextrose 50 % solution 12.5 g, 12.5 g, Intravenous, PRN, Abilio Saez MD    glucagon (rDNA) injection 1 mg, 1 mg, Intramuscular, PRN, Abilio Saez MD    dextrose 5 % solution, 100 mL/hr, Intravenous, PRN, Abilio Saez MD    fentaNYL (SUBLIMAZE) injection 50 mcg, 50 mcg, Intravenous, Q1H PRN, Abilio Saez MD, 50 mcg at 08/22/18 0852    insulin lispro (HUMALOG) injection vial 0-6 Units, 0-6 Units, Subcutaneous, Q6H, Abilio Saez MD, 2 Units at 08/22/18 0445    midazolam (VERSED) 100 mg in dextrose 5% 100 mL infusion, 1 mg/hr, Intravenous, Continuous, Abilio Saez MD, Last Rate: 3 mL/hr at 08/22/18 0837, 3 mg/hr at 08/22/18 0837     Ct Abdomen Pelvis Wo Contrast Additional Contrast? hemorrhage, mass effect or midline shift. No abnormal extra-axial fluid collection. The gray-white differentiation is maintained without evidence of an acute infarct. There is no evidence of hydrocephalus. ORBITS: The visualized portion of the orbits demonstrate no acute abnormality. SINUSES: The visualized paranasal sinuses and mastoid air cells demonstrate no acute abnormality. SOFT TISSUES/SKULL:  No acute abnormality of the visualized skull or soft tissues. Incompletely visualized endotracheal and enteric tubes identified     No CT evidence for acute intracranial abnormality. Ct Cervical Spine Wo Contrast    Result Date: 8/21/2018  EXAMINATION: CT OF THE CERVICAL SPINE WITHOUT CONTRAST 8/21/2018 11:58 am TECHNIQUE: CT of the cervical spine was performed without the administration of intravenous contrast. Multiplanar reformatted images are provided for review. Dose modulation, iterative reconstruction, and/or weight based adjustment of the mA/kV was utilized to reduce the radiation dose to as low as reasonably achievable. COMPARISON: None. HISTORY: ORDERING SYSTEM PROVIDED HISTORY: possible trauma, unresponsive FINDINGS: BONES/ALIGNMENT: There is no evidence of an acute cervical spine fracture. There is normal alignment of the cervical spine. DEGENERATIVE CHANGES: Mild-to-moderate degenerative change. SOFT TISSUES: There is no prevertebral soft tissue swelling. Dense calcification related to the right lobe of the thyroid gland. No acute abnormality of the cervical spine. Moderate degenerative change. Right thyroid lobe calcification. Xr Chest Portable    Result Date: 8/22/2018  EXAMINATION: SINGLE XRAY VIEW OF THE CHEST 8/22/2018 6:28 am COMPARISON: 08/21/2018 HISTORY: ORDERING SYSTEM PROVIDED HISTORY: verification of ETT placement TECHNOLOGIST PROVIDED HISTORY: verification of ETT placement FINDINGS: Tip of the endotracheal tube is above the keyon at the level of the clavicular heads.   Right pulmonary vascular/interstitial markings. No focal airspace consolidation, pleural effusion or pneumothorax. Heart size appears within normal limits. Visualized osseous structures appear intact and grossly unremarkable, given the non dedicated imaging. 1. Expected positions of medical support devices. 2. Nonspecific prominence of the interstitial/pulmonary vascular markings can be seen in setting of vascular congestion, chronic interstitial change and/or atypical pneumonitis. No focal airspace consolidation. Intake/Output Summary (Last 24 hours) at 08/22/18 0936  Last data filed at 08/22/18 0816   Gross per 24 hour   Intake           4639.9 ml   Output             1149 ml   Net           3490.9 ml        IMPRESSION / RECOMMENDATIONS: Ms. Rigo Ibarra is a 66 y.o. female admitted to the hospital on 8/21/2018 after being found unresponsive. Ffollowed for management of possible GI bleed. Differential diagnosis: esophagitis, gastritis, gastropathy, PUD, duodenitis, or malignancy. No evidence of ongoing GI bleeding  Please D/C Octreotide and PPI gtte  Daily PPI 40 mg is appropriate  The patient will need an elective EGD as an outpatient after D/C  GI will sign-off. If she develops overt GI bleeding or drops her Hb significantly, we will consider inpatient endoscopy.       Maria Welch MD

## 2018-08-22 NOTE — PLAN OF CARE
Problem: OXYGENATION/RESPIRATORY FUNCTION  Goal: Patient will maintain patent airway  Outcome: Ongoing    Goal: Patient will achieve/maintain normal respiratory rate/effort  Respiratory rate and effort will be within normal limits for the patient   Outcome: Ongoing      Problem: SKIN INTEGRITY  Goal: Skin integrity is maintained or improved  Outcome: Ongoing  Continue to assess skin every shift & reposition for comfort/skin integrity. Problem: Falls - Risk of:  Goal: Will remain free from falls  Will remain free from falls   Outcome: Met This Shift  No falls this shift. Bed in lowest position & locked with SR up. Continue with frequent nursing rounds. Goal: Absence of physical injury  Absence of physical injury   Outcome: Met This Shift      Problem: Risk for Impaired Skin Integrity  Goal: Tissue integrity - skin and mucous membranes  Structural intactness and normal physiological function of skin and  mucous membranes. Outcome: Ongoing  Continue to assess skin every shift & reposition for comfort/skin integrity.
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
## 2018-08-22 NOTE — PROGRESS NOTES
[facility] Nurse  Consult Note       NAME:  Shanda Denis RECORD NUMBER:  [id number]  AGE: 66 y.o. GENDER: female  : 1955  TODAY'S DATE:  2018    Subjective:     Reason for WOCN Evaluation and Assessment: pressure and traumatic injuries present upon admission. left flank, ribs, buttock, and thigh injuries present upon admission. Found down at home. Deep tissue injuries on left thigh and buttock. Flank and ribs appear more traumatic in nature. right buttock with blanchable erythema.        Swathi Stephens is a 66 y.o. female referred by:   [x] Physician  [] Nursing  [] Other:     Wound Identification:  Wound Type: pressure  Contributing Factors: chronic pressure, decreased mobility, shear force and obesity        PAST MEDICAL HISTORY        Diagnosis Date    Degenerative disc disease, lumbar 2018       PAST SURGICAL HISTORY    Past Surgical History:   Procedure Laterality Date    EYE SURGERY      cateract removal    TONSILLECTOMY Bilateral 1963       FAMILY HISTORY    Family History   Problem Relation Age of Onset    Cancer Mother     Cancer Father     Arthritis Sister        SOCIAL HISTORY    Social History   Substance Use Topics    Smoking status: Current Every Day Smoker     Packs/day: 2.00     Years: 3.00     Types: Cigarettes    Smokeless tobacco: Never Used      Comment: on & off smoking    Alcohol use No       ALLERGIES    No Known Allergies        Objective:      BP (!) 105/57   Pulse 118   Temp 98.8 °F (37.1 °C) (Oral)   Resp 17   Ht 5' 7\" (1.702 m)   Wt 205 lb 14.6 oz (93.4 kg)   SpO2 90%   BMI 32.25 kg/m²   Pankaj Risk Score: Pankaj Scale Score: 11    LABS    CBC:   Lab Results   Component Value Date    WBC 31.8 2018    RBC 4.90 2018    HGB 14.4 2018     CMP:  Albumin:    Lab Results   Component Value Date    LABALBU 3.3 2018     PT/INR:    Lab Results   Component Value Date    PROTIME 11.8 2018    INR (Comment) Left skin tear to left flank   Date First Assessed/Time First Assessed: 08/21/18 1545   Wound Type: Skin tear  Wound Event: Not known  Location: (c) Other (Comment)  Wound Location Orientation: Left  Wound Description (Comments): skin tear to left flank  Pre-existing: Yes  Photo Ta. .. Wound Type Wound   Dressing Status Clean;Dry; Intact   Dressing Changed Changed/New   Dressing/Treatment Foam  (mepilex)   Dressing Change Due 08/25/18   Wound Length (cm) 10 cm   Wound Width (cm) 12 cm   Calculated Wound Size (cm^2) (l*w) 120 cm^2   Wound Assessment Dry;Fragile;Pink  (linear excoriations and blistering)   Drainage Amount Scant   Drainage Description Serosanguinous   Odor None   Wound 08/22/18 Buttocks Left   Date First Assessed/Time First Assessed: 08/22/18 0712   Wound Event: Pressure  Location: Buttocks  Wound Location Orientation: Left  Pre-existing: Yes   Wound Type Wound   Wound Deep tissue/Injury   Dressing Status Dry; Intact   Wound Length (cm) 3 cm  (3.0cm x 3.0cm x 0 cm)   Wound Assessment Dry; Intact; Light purple   Drainage Amount None   Odor None   Katy-wound Assessment Dry;Blanchable erythema       Response to treatment:  Well tolerated by patient. Plan:     Plan of Care: Turn every 2 hours  Float heels of of bed with pillows under calves    Use lift sling to reposition patient to minimize potential for shear injury. Foam sacrum dressing to sacrococcygeal area. Peel back dressing, inspect skin beneath, re-secure. Change every 72 hours and prn wrinkles, soilage. Discontinue Sacral dressing if repeatedly soiled by incontinence. Routine incontinence care with incontinence barrier cloths and zinc oxide cream.   Apply zinc oxide cream BID and prn incontinence. Moisture wicking under pads vs cloth backed briefs  Mepilex foam dressings to the left flank and rib wounds. Change every 72 hours  Zinc paste to the buttock and lateral thigh wounds.  BID    Specialty Bed Required : Yes   []

## 2018-08-23 PROBLEM — E55.9 VITAMIN D DEFICIENCY: Status: ACTIVE | Noted: 2018-08-23

## 2018-08-23 LAB
ABSOLUTE EOS #: <0.03 K/UL (ref 0–0.44)
ABSOLUTE IMMATURE GRANULOCYTE: 0.18 K/UL (ref 0–0.3)
ABSOLUTE LYMPH #: 1.97 K/UL (ref 1.1–3.7)
ABSOLUTE MONO #: 0.88 K/UL (ref 0.1–1.2)
ANION GAP SERPL CALCULATED.3IONS-SCNC: 10 MMOL/L (ref 9–17)
BASOPHILS # BLD: 0 % (ref 0–2)
BASOPHILS ABSOLUTE: 0.03 K/UL (ref 0–0.2)
BNP INTERPRETATION: ABNORMAL
BUN BLDV-MCNC: 13 MG/DL (ref 8–23)
BUN/CREAT BLD: ABNORMAL (ref 9–20)
CALCIUM IONIZED: 1.17 MMOL/L (ref 1.13–1.33)
CALCIUM SERPL-MCNC: 8.6 MG/DL (ref 8.6–10.4)
CHLORIDE BLD-SCNC: 111 MMOL/L (ref 98–107)
CO2: 25 MMOL/L (ref 20–31)
CREAT SERPL-MCNC: 0.97 MG/DL (ref 0.5–0.9)
DIFFERENTIAL TYPE: ABNORMAL
EOSINOPHILS RELATIVE PERCENT: 0 % (ref 1–4)
FERRITIN: 2335 UG/L (ref 13–150)
FOLATE: >20 NG/ML
GFR AFRICAN AMERICAN: >60 ML/MIN
GFR NON-AFRICAN AMERICAN: 58 ML/MIN
GFR SERPL CREATININE-BSD FRML MDRD: ABNORMAL ML/MIN/{1.73_M2}
GFR SERPL CREATININE-BSD FRML MDRD: ABNORMAL ML/MIN/{1.73_M2}
GLUCOSE BLD-MCNC: 110 MG/DL (ref 70–99)
HCT VFR BLD CALC: 39.3 % (ref 36.3–47.1)
HEMOGLOBIN: 12.6 G/DL (ref 11.9–15.1)
IMMATURE GRANULOCYTES: 1 %
IRON SATURATION: 36 % (ref 20–55)
IRON: 51 UG/DL (ref 37–145)
LACTIC ACID, WHOLE BLOOD: 1.1 MMOL/L (ref 0.7–2.1)
LYMPHOCYTES # BLD: 10 % (ref 24–43)
MAGNESIUM: 2.5 MG/DL (ref 1.6–2.6)
MCH RBC QN AUTO: 31.3 PG (ref 25.2–33.5)
MCHC RBC AUTO-ENTMCNC: 32.1 G/DL (ref 28.4–34.8)
MCV RBC AUTO: 97.8 FL (ref 82.6–102.9)
MONOCYTES # BLD: 5 % (ref 3–12)
NRBC AUTOMATED: 0 PER 100 WBC
PDW BLD-RTO: 15 % (ref 11.8–14.4)
PHOSPHORUS: 1.5 MG/DL (ref 2.6–4.5)
PLATELET # BLD: 175 K/UL (ref 138–453)
PLATELET ESTIMATE: ABNORMAL
PMV BLD AUTO: 10.7 FL (ref 8.1–13.5)
POTASSIUM SERPL-SCNC: 3.6 MMOL/L (ref 3.7–5.3)
PRO-BNP: 838 PG/ML
RBC # BLD: 4.02 M/UL (ref 3.95–5.11)
RBC # BLD: ABNORMAL 10*6/UL
SEG NEUTROPHILS: 84 % (ref 36–65)
SEGMENTED NEUTROPHILS ABSOLUTE COUNT: 16.59 K/UL (ref 1.5–8.1)
SODIUM BLD-SCNC: 146 MMOL/L (ref 135–144)
TOTAL IRON BINDING CAPACITY: 142 UG/DL (ref 250–450)
TRANSFERRIN: 107 MG/DL (ref 200–360)
UNSATURATED IRON BINDING CAPACITY: 91 UG/DL (ref 112–347)
VITAMIN B-12: 783 PG/ML (ref 232–1245)
VITAMIN D 25-HYDROXY: 21.6 NG/ML (ref 30–100)
WBC # BLD: 19.7 K/UL (ref 3.5–11.3)
WBC # BLD: ABNORMAL 10*3/UL

## 2018-08-23 PROCEDURE — 6360000002 HC RX W HCPCS: Performed by: HOSPITALIST

## 2018-08-23 PROCEDURE — 84100 ASSAY OF PHOSPHORUS: CPT

## 2018-08-23 PROCEDURE — 76937 US GUIDE VASCULAR ACCESS: CPT

## 2018-08-23 PROCEDURE — 2500000003 HC RX 250 WO HCPCS: Performed by: STUDENT IN AN ORGANIZED HEALTH CARE EDUCATION/TRAINING PROGRAM

## 2018-08-23 PROCEDURE — 85025 COMPLETE CBC W/AUTO DIFF WBC: CPT

## 2018-08-23 PROCEDURE — 94762 N-INVAS EAR/PLS OXIMTRY CONT: CPT

## 2018-08-23 PROCEDURE — 83735 ASSAY OF MAGNESIUM: CPT

## 2018-08-23 PROCEDURE — 99232 SBSQ HOSP IP/OBS MODERATE 35: CPT | Performed by: PSYCHIATRY & NEUROLOGY

## 2018-08-23 PROCEDURE — 94660 CPAP INITIATION&MGMT: CPT

## 2018-08-23 PROCEDURE — 84466 ASSAY OF TRANSFERRIN: CPT

## 2018-08-23 PROCEDURE — 2580000003 HC RX 258: Performed by: HOSPITALIST

## 2018-08-23 PROCEDURE — 6360000002 HC RX W HCPCS

## 2018-08-23 PROCEDURE — 82330 ASSAY OF CALCIUM: CPT

## 2018-08-23 PROCEDURE — 6360000002 HC RX W HCPCS: Performed by: STUDENT IN AN ORGANIZED HEALTH CARE EDUCATION/TRAINING PROGRAM

## 2018-08-23 PROCEDURE — 6370000000 HC RX 637 (ALT 250 FOR IP): Performed by: STUDENT IN AN ORGANIZED HEALTH CARE EDUCATION/TRAINING PROGRAM

## 2018-08-23 PROCEDURE — 99291 CRITICAL CARE FIRST HOUR: CPT | Performed by: INTERNAL MEDICINE

## 2018-08-23 PROCEDURE — 83550 IRON BINDING TEST: CPT

## 2018-08-23 PROCEDURE — 82306 VITAMIN D 25 HYDROXY: CPT

## 2018-08-23 PROCEDURE — 83605 ASSAY OF LACTIC ACID: CPT

## 2018-08-23 PROCEDURE — 83880 ASSAY OF NATRIURETIC PEPTIDE: CPT

## 2018-08-23 PROCEDURE — 80048 BASIC METABOLIC PNL TOTAL CA: CPT

## 2018-08-23 PROCEDURE — 2000000000 HC ICU R&B

## 2018-08-23 PROCEDURE — 2580000003 HC RX 258: Performed by: STUDENT IN AN ORGANIZED HEALTH CARE EDUCATION/TRAINING PROGRAM

## 2018-08-23 PROCEDURE — 83540 ASSAY OF IRON: CPT

## 2018-08-23 PROCEDURE — 93005 ELECTROCARDIOGRAM TRACING: CPT

## 2018-08-23 PROCEDURE — 82728 ASSAY OF FERRITIN: CPT

## 2018-08-23 PROCEDURE — C9113 INJ PANTOPRAZOLE SODIUM, VIA: HCPCS | Performed by: HOSPITALIST

## 2018-08-23 PROCEDURE — 6370000000 HC RX 637 (ALT 250 FOR IP): Performed by: HOSPITALIST

## 2018-08-23 RX ORDER — TRAZODONE HYDROCHLORIDE 50 MG/1
50 TABLET ORAL NIGHTLY
Status: DISCONTINUED | OUTPATIENT
Start: 2018-08-23 | End: 2018-08-28

## 2018-08-23 RX ORDER — POTASSIUM CHLORIDE 20 MEQ/1
20 TABLET, EXTENDED RELEASE ORAL ONCE
Status: DISCONTINUED | OUTPATIENT
Start: 2018-08-23 | End: 2018-08-23

## 2018-08-23 RX ORDER — FENTANYL CITRATE 50 UG/ML
25 INJECTION, SOLUTION INTRAMUSCULAR; INTRAVENOUS
Status: DISCONTINUED | OUTPATIENT
Start: 2018-08-23 | End: 2018-08-23

## 2018-08-23 RX ORDER — MAGNESIUM SULFATE 1 G/100ML
1 INJECTION INTRAVENOUS PRN
Status: DISCONTINUED | OUTPATIENT
Start: 2018-08-23 | End: 2018-08-31 | Stop reason: HOSPADM

## 2018-08-23 RX ORDER — POTASSIUM CHLORIDE 29.8 MG/ML
20 INJECTION INTRAVENOUS ONCE
Status: COMPLETED | OUTPATIENT
Start: 2018-08-23 | End: 2018-08-23

## 2018-08-23 RX ORDER — LORAZEPAM 2 MG/ML
2 INJECTION INTRAMUSCULAR ONCE
Status: COMPLETED | OUTPATIENT
Start: 2018-08-23 | End: 2018-08-23

## 2018-08-23 RX ORDER — CLONAZEPAM 1 MG/1
1 TABLET ORAL DAILY
Status: DISCONTINUED | OUTPATIENT
Start: 2018-08-23 | End: 2018-08-23

## 2018-08-23 RX ORDER — OXYCODONE HYDROCHLORIDE 5 MG/1
5 TABLET ORAL EVERY 4 HOURS PRN
Status: DISCONTINUED | OUTPATIENT
Start: 2018-08-23 | End: 2018-08-31 | Stop reason: HOSPADM

## 2018-08-23 RX ORDER — FENTANYL CITRATE 50 UG/ML
100 INJECTION, SOLUTION INTRAMUSCULAR; INTRAVENOUS ONCE
Status: COMPLETED | OUTPATIENT
Start: 2018-08-23 | End: 2018-08-24

## 2018-08-23 RX ORDER — FENTANYL CITRATE 50 UG/ML
100 INJECTION, SOLUTION INTRAMUSCULAR; INTRAVENOUS
Status: DISCONTINUED | OUTPATIENT
Start: 2018-08-23 | End: 2018-08-23

## 2018-08-23 RX ORDER — FENTANYL CITRATE 50 UG/ML
50 INJECTION, SOLUTION INTRAMUSCULAR; INTRAVENOUS
Status: DISCONTINUED | OUTPATIENT
Start: 2018-08-23 | End: 2018-08-28

## 2018-08-23 RX ORDER — HALOPERIDOL 5 MG/ML
5 INJECTION INTRAMUSCULAR ONCE
Status: DISCONTINUED | OUTPATIENT
Start: 2018-08-23 | End: 2018-08-23

## 2018-08-23 RX ORDER — MIRTAZAPINE 30 MG/1
30 TABLET, FILM COATED ORAL NIGHTLY
Status: DISCONTINUED | OUTPATIENT
Start: 2018-08-23 | End: 2018-08-31 | Stop reason: HOSPADM

## 2018-08-23 RX ORDER — HALOPERIDOL 5 MG/ML
5 INJECTION INTRAMUSCULAR EVERY 6 HOURS PRN
Status: DISCONTINUED | OUTPATIENT
Start: 2018-08-23 | End: 2018-08-23

## 2018-08-23 RX ORDER — POTASSIUM CHLORIDE 7.45 MG/ML
10 INJECTION INTRAVENOUS PRN
Status: DISCONTINUED | OUTPATIENT
Start: 2018-08-23 | End: 2018-08-31 | Stop reason: HOSPADM

## 2018-08-23 RX ORDER — POTASSIUM CHLORIDE 20MEQ/15ML
40 LIQUID (ML) ORAL PRN
Status: DISCONTINUED | OUTPATIENT
Start: 2018-08-23 | End: 2018-08-31 | Stop reason: HOSPADM

## 2018-08-23 RX ORDER — CLONAZEPAM 1 MG/1
1 TABLET ORAL 2 TIMES DAILY
Status: DISCONTINUED | OUTPATIENT
Start: 2018-08-23 | End: 2018-08-31 | Stop reason: HOSPADM

## 2018-08-23 RX ORDER — HALOPERIDOL 5 MG/ML
INJECTION INTRAMUSCULAR
Status: COMPLETED
Start: 2018-08-23 | End: 2018-08-23

## 2018-08-23 RX ORDER — OXYCODONE HYDROCHLORIDE 5 MG/1
10 TABLET ORAL EVERY 4 HOURS PRN
Status: DISCONTINUED | OUTPATIENT
Start: 2018-08-23 | End: 2018-08-31 | Stop reason: HOSPADM

## 2018-08-23 RX ORDER — FENTANYL CITRATE 50 UG/ML
25 INJECTION, SOLUTION INTRAMUSCULAR; INTRAVENOUS
Status: DISCONTINUED | OUTPATIENT
Start: 2018-08-23 | End: 2018-08-28

## 2018-08-23 RX ORDER — FENTANYL CITRATE 50 UG/ML
50 INJECTION, SOLUTION INTRAMUSCULAR; INTRAVENOUS
Status: DISCONTINUED | OUTPATIENT
Start: 2018-08-23 | End: 2018-08-23

## 2018-08-23 RX ORDER — ROPINIROLE 1 MG/1
1 TABLET, FILM COATED ORAL 3 TIMES DAILY
Status: DISCONTINUED | OUTPATIENT
Start: 2018-08-23 | End: 2018-08-24

## 2018-08-23 RX ORDER — POTASSIUM CHLORIDE 20 MEQ/1
40 TABLET, EXTENDED RELEASE ORAL PRN
Status: DISCONTINUED | OUTPATIENT
Start: 2018-08-23 | End: 2018-08-31 | Stop reason: HOSPADM

## 2018-08-23 RX ORDER — ERGOCALCIFEROL 1.25 MG/1
50000 CAPSULE ORAL WEEKLY
Status: DISCONTINUED | OUTPATIENT
Start: 2018-08-23 | End: 2018-08-31 | Stop reason: HOSPADM

## 2018-08-23 RX ORDER — HALOPERIDOL 5 MG/ML
5 INJECTION INTRAMUSCULAR ONCE
Status: COMPLETED | OUTPATIENT
Start: 2018-08-23 | End: 2018-08-23

## 2018-08-23 RX ADMIN — FENTANYL CITRATE 50 MCG: 50 INJECTION INTRAMUSCULAR; INTRAVENOUS at 20:08

## 2018-08-23 RX ADMIN — TRAZODONE HYDROCHLORIDE 50 MG: 50 TABLET ORAL at 21:10

## 2018-08-23 RX ADMIN — HALOPERIDOL 5 MG: 5 INJECTION INTRAMUSCULAR at 09:24

## 2018-08-23 RX ADMIN — SODIUM CHLORIDE 3 G: 900 INJECTION INTRAVENOUS at 21:08

## 2018-08-23 RX ADMIN — FENTANYL CITRATE 50 MCG: 50 INJECTION, SOLUTION INTRAMUSCULAR; INTRAVENOUS at 14:16

## 2018-08-23 RX ADMIN — POTASSIUM CHLORIDE 20 MEQ: 400 INJECTION, SOLUTION INTRAVENOUS at 11:56

## 2018-08-23 RX ADMIN — ROPINIROLE HYDROCHLORIDE 1 MG: 1 TABLET, FILM COATED ORAL at 08:38

## 2018-08-23 RX ADMIN — LORAZEPAM 2 MG: 2 INJECTION INTRAMUSCULAR; INTRAVENOUS at 08:35

## 2018-08-23 RX ADMIN — SODIUM CHLORIDE 3 G: 900 INJECTION INTRAVENOUS at 08:38

## 2018-08-23 RX ADMIN — HALOPERIDOL LACTATE 5 MG: 5 INJECTION, SOLUTION INTRAMUSCULAR at 09:24

## 2018-08-23 RX ADMIN — FENTANYL CITRATE 100 MCG: 50 INJECTION, SOLUTION INTRAMUSCULAR; INTRAVENOUS at 11:56

## 2018-08-23 RX ADMIN — ERGOCALCIFEROL 50000 UNITS: 1.25 CAPSULE ORAL at 20:08

## 2018-08-23 RX ADMIN — PANTOPRAZOLE SODIUM 40 MG: 40 INJECTION, POWDER, FOR SOLUTION INTRAVENOUS at 08:38

## 2018-08-23 RX ADMIN — FENTANYL CITRATE 50 MCG: 50 INJECTION, SOLUTION INTRAMUSCULAR; INTRAVENOUS at 02:56

## 2018-08-23 RX ADMIN — MIRTAZAPINE 30 MG: 30 TABLET, FILM COATED ORAL at 21:10

## 2018-08-23 RX ADMIN — PREGABALIN 150 MG: 75 CAPSULE ORAL at 21:10

## 2018-08-23 RX ADMIN — Medication 10 ML: at 08:39

## 2018-08-23 RX ADMIN — SODIUM CHLORIDE: 4.5 INJECTION, SOLUTION INTRAVENOUS at 02:55

## 2018-08-23 RX ADMIN — CLONAZEPAM 1 MG: 1 TABLET ORAL at 11:03

## 2018-08-23 RX ADMIN — SODIUM CHLORIDE 3 G: 900 INJECTION INTRAVENOUS at 02:55

## 2018-08-23 RX ADMIN — FENTANYL CITRATE 50 MCG: 50 INJECTION INTRAMUSCULAR; INTRAVENOUS at 22:23

## 2018-08-23 RX ADMIN — POTASSIUM CHLORIDE 20 MEQ: 400 INJECTION, SOLUTION INTRAVENOUS at 12:19

## 2018-08-23 RX ADMIN — CLONAZEPAM 1 MG: 1 TABLET ORAL at 21:08

## 2018-08-23 RX ADMIN — ROPINIROLE HYDROCHLORIDE 1 MG: 1 TABLET, FILM COATED ORAL at 21:09

## 2018-08-23 RX ADMIN — Medication 10 ML: at 21:11

## 2018-08-23 RX ADMIN — SODIUM CHLORIDE 3 G: 900 INJECTION INTRAVENOUS at 14:19

## 2018-08-23 RX ADMIN — PREGABALIN 150 MG: 75 CAPSULE ORAL at 08:38

## 2018-08-23 RX ADMIN — FENTANYL CITRATE 25 MCG: 50 INJECTION, SOLUTION INTRAMUSCULAR; INTRAVENOUS at 18:09

## 2018-08-23 RX ADMIN — SODIUM PHOSPHATE, MONOBASIC, MONOHYDRATE 29.88 MMOL: 276; 142 INJECTION, SOLUTION INTRAVENOUS at 13:32

## 2018-08-23 RX ADMIN — FENTANYL CITRATE 50 MCG: 50 INJECTION, SOLUTION INTRAMUSCULAR; INTRAVENOUS at 01:09

## 2018-08-23 RX ADMIN — FENTANYL CITRATE 50 MCG: 50 INJECTION INTRAMUSCULAR; INTRAVENOUS at 21:08

## 2018-08-23 RX ADMIN — Medication 2 MG: at 21:08

## 2018-08-23 RX ADMIN — FENTANYL CITRATE 50 MCG: 50 INJECTION, SOLUTION INTRAMUSCULAR; INTRAVENOUS at 16:20

## 2018-08-23 RX ADMIN — FENTANYL CITRATE 50 MCG: 50 INJECTION, SOLUTION INTRAMUSCULAR; INTRAVENOUS at 04:39

## 2018-08-23 ASSESSMENT — PULMONARY FUNCTION TESTS
PIF_VALUE: 15
PIF_VALUE: 18

## 2018-08-23 ASSESSMENT — PAIN SCALES - GENERAL
PAINLEVEL_OUTOF10: 10

## 2018-08-23 NOTE — PLAN OF CARE
Problem: OXYGENATION/RESPIRATORY FUNCTION  Goal: Patient will maintain patent airway  Outcome: Ongoing    Goal: Patient will achieve/maintain normal respiratory rate/effort  Respiratory rate and effort will be within normal limits for the patient   Outcome: Ongoing      Problem: Falls - Risk of:  Goal: Will remain free from falls  Will remain free from falls   Outcome: Ongoing    Goal: Absence of physical injury  Absence of physical injury   Outcome: Ongoing      Problem: Risk for Impaired Skin Integrity  Goal: Tissue integrity - skin and mucous membranes  Structural intactness and normal physiological function of skin and  mucous membranes.    Outcome: Ongoing      Problem: Restraint Use - Nonviolent/Non-Self-Destructive Behavior:  Goal: Absence of restraint indications  Absence of restraint indications   Outcome: Not Met This Shift    Goal: Absence of restraint-related injury  Absence of restraint-related injury   Outcome: Met This Shift      Problem: Nutrition  Goal: Optimal nutrition therapy  Outcome: Ongoing

## 2018-08-23 NOTE — FLOWSHEET NOTE
08/21/18 1146   Oxygen Therapy/Pulse Ox   FiO2  60 %   SpO2 100 %
Patient yelling, \"I can't breathe\" multiple times. Kurt Aden, RT, at bedside & places patient on venturi mask at 55%. Respirations even & unlabored. Sats 90%. Support given. Will continue to monitor.
Visit:   attempted visit with patient while nursing giving medication. Patient has been calling out and loudly moaning for many hours. Intervention:  ·  asked patient what can we do for her and she said call my sister Vasile Brody. ·  called Vasile Brody and she will arrive at hospital soon. ·  attempted to help quiet patient and encourage her breathing through her nose to no avail.   Plan:  · Chaplains are available for support for patient or family during this stressful time and may be paged for specific visits at any time via [address] [place] [address] or by calling [phone].     08/23/18 8342   Encounter Summary   Services provided to: Patient   Referral/Consult From: Nurse   Support System Family members  (her sister Vasile Brody)   Continue Visiting (8/23)   Complexity of Encounter High   Length of Encounter 15 minutes   Crisis   Type Follow up   Assessment (constant moaning and calling out)   Intervention Contacted support as requested per patient/family request   Who? Vasile Brody   Why? patient's sister   At Request Of patient request   Outcome (continued agitation)
[facility]  [phone]

## 2018-08-23 NOTE — PROGRESS NOTES
Right Subclavian [] Left Subclavian       BEEBE'S CATHETER:   [x] No   [] Yes  (Date of Insertion:   )     URINE OUTPUT:            [x] Good   [] Low              [] Anuric      OBJECTIVE:     VITAL SIGNS:  BP (!) 110/47   Pulse 59   Temp 98.2 °F (36.8 °C) (Oral)   Resp 19   Ht 5' 7\" (1.702 m)   Wt 205 lb 14.6 oz (93.4 kg)   SpO2 91%   BMI 32.25 kg/m²   Tmax over 24 hours:  Temp (24hrs), Av.5 °F (36.9 °C), Min:98.1 °F (36.7 °C), Max:99.7 °F (37.6 °C)      Patient Vitals for the past 6 hrs:   BP Temp Temp src Pulse Resp SpO2   18 0600 (!) 110/47 - - 59 19 91 %   18 0500 (!) 103/48 - - 63 15 (!) 89 %   18 0400 136/64 98.2 °F (36.8 °C) Oral 97 15 95 %   18 0300 (!) 96/49 - - 58 17 95 %   18 0200 (!) 101/54 - - 72 16 95 %         Intake/Output Summary (Last 24 hours) at 18 0741  Last data filed at 18 0400   Gross per 24 hour   Intake          3439.83 ml   Output              637 ml   Net          2802.83 ml     Wt Readings from Last 2 Encounters:   18 205 lb 14.6 oz (93.4 kg)     Body mass index is 32.25 kg/m². PHYSICAL EXAMINATION:  Constitutional: Appears well, no distress  EENT: PERRLA, EOMI, sclera clear, anicteric, oropharynx clear, no lesions, neck supple with midline trachea. Neck: Supple, symmetrical, trachea midline, no adenopathy,  no jvd, skin normal  Respiratory: clear to auscultation, no wheezes or rales and unlabored breathing.  No intercostal tenderness  Cardiovascular: regular rate and rhythm, normal S1, S2, no murmur noted  Abdomen: soft, nontender, nondistended, no masses or organomegaly  Extremities:   no pedal edema, no clubbing or cyanosis  Skin: abrasions to L thigh  Neuro: alert and oriented only to name; CNII-XII grossly intact         MEDICATIONS:    Scheduled Meds:   pantoprazole  40 mg Intravenous Daily    ampicillin-sulbactam  3 g Intravenous Q6H    pregabalin  150 mg Oral BID    rOPINIRole  1 mg Oral BID    sodium 2.08*   --   1.60*   GLUCOSE   --    --   301*   --   200*   CALCIUM   --    --   8.8   --   6.9*   PROT   --    < >  7.1   --   5.7*   LABALBU   --    < >  4.2   --   3.3*   BILITOT   --    < >  <0.10*   --   <0.10*   ALKPHOS   --    < >  86   --   66   AST   --    < >  22   --   28   ALT   --    < >  13   --   15    < > = values in this interval not displayed.       Magnesium:   Lab Results   Component Value Date    MG 2.2 08/21/2018     Phosphorus: No results found for: PHOS  Ionized Calcium: No results found for: CAION     Urinalysis:     Troponin:   Recent Labs      08/21/18   1031  08/21/18   1244   TROPONINI  0.06  0.08       Microbiology:    Cultures during this admission:     Blood cultures:                 [] None drawn      [x] Negative             []  Positive (Details:  )  Urine Culture:                   [] None drawn      [x] Negative             []  Positive (Details:  )  Sputum Culture:               [] None drawn       [] Negative             [x]  Positive (Details: gram + cocci )   Endotracheal aspirate:     [x] None drawn       [] Negative             []  Positive (Details:  )     Other pertinent Labs:       Radiology/Imaging:     Chest Xray (8/23/2018):    ASSESSMENT:     Patient Active Problem List    Diagnosis Date Noted    Hypernatremia 08/22/2018    Acute metabolic encephalopathy     Unresponsive     Arterial hypotension     Hypoxic ischemic encephalopathy     Encephalopathy acute 08/21/2018    Hyperglycemia 08/21/2018    Hypokalemia 08/21/2018    KATELYNN (acute kidney injury) ([facility] 08/21/2018    Dehydration 08/21/2018    NSVT (nonsustained ventricular tachycardia) ([facility] 08/21/2018    Depression 08/21/2018    Aspiration pneumonia ([facility] 08/21/2018    Non-traumatic rhabdomyolysis 08/21/2018       PLAN:     WEAN PER PROTOCOL:  [] No   [] Yes  [x] N/A    DISCONTINUE ANY LABS:   [x] No   [] Yes    ICU PROPHYLAXIS:  Stress ulcer:  [x] PPI Agent  [] [id number] [] Sucralfate  [] Other:  VTE: [] Enoxaparin  [] Unfract. Heparin Subcut  [x] EPC Cuffs    NUTRITION:  [] NPO [] Tube Feeding (Specify: ) [] TPN  [x] PO (Diet: DIET GENERAL;)    HOME MEDICATIONS RECONCILED: [x] No  [] Yes    INSULIN DRIP:   [x] No   [] Yes    CONSULTATION NEEDED:  [] No   [x] Yes    FAMILY UPDATED:    [x] No   [] Yes    TRANSFER OUT OF ICU:   [x] No   [] Yes    Plan:    NEURO:  - AO x 3  - Neuro checks;  Ativan PRN for agitation - appreciate neuro recs  - Analgesia: fentanyl PRN  - continue Lyrica, Requip    CV:  - BP and HR stable  - Continue to monitor  - EKG when less agitated - prolonged QT on initial EKG - if considering Haldol    PULM:  - No respiratory distress - high flow NC if needed  - Encourage frequent pulmonary toilet with IS, deep breathing/ coughing exercises    GI/DVT PROPHYLAXIS:  - PPI/SCDs  - Continue regular diet    RENAL:  - Adequate urine output  - Continue to monitor  - IVF: NS @ 50  - F/u BMP; replace electrolytes PRN    ID/ HEME:  - Afebrile / Tmax 98.2  - continue to monitor WBC and f/u CMP  - Continue Unasyn (abx day 3)  - f/u blood cultures     PT/OT/SLP:  - Evaluation and treatment when less agitated    DISPO:  - When appropriate, await clinical improvement for transfer/ discharge         [place] Live DO  General Surgery PGY-1  Department of Internal Medicine/ Critical care  [id number] \[facility]\"")         Pager # [phone]   8/23/2018, 7:41 AM

## 2018-08-24 LAB
ABSOLUTE EOS #: 0.05 K/UL (ref 0–0.44)
ABSOLUTE IMMATURE GRANULOCYTE: 0.17 K/UL (ref 0–0.3)
ABSOLUTE LYMPH #: 1.26 K/UL (ref 1.1–3.7)
ABSOLUTE MONO #: 0.65 K/UL (ref 0.1–1.2)
AMMONIA: 28 UMOL/L (ref 11–51)
ANION GAP SERPL CALCULATED.3IONS-SCNC: 13 MMOL/L (ref 9–17)
BASOPHILS # BLD: 0 % (ref 0–2)
BASOPHILS ABSOLUTE: 0.03 K/UL (ref 0–0.2)
BUN BLDV-MCNC: 10 MG/DL (ref 8–23)
BUN/CREAT BLD: ABNORMAL (ref 9–20)
CALCIUM IONIZED: 1.14 MMOL/L (ref 1.13–1.33)
CALCIUM SERPL-MCNC: 8.3 MG/DL (ref 8.6–10.4)
CHLORIDE BLD-SCNC: 106 MMOL/L (ref 98–107)
CO2: 27 MMOL/L (ref 20–31)
CREAT SERPL-MCNC: 0.73 MG/DL (ref 0.5–0.9)
CULTURE: ABNORMAL
DIFFERENTIAL TYPE: ABNORMAL
DIRECT EXAM: ABNORMAL
EKG ATRIAL RATE: 109 BPM
EKG P AXIS: 65 DEGREES
EKG P-R INTERVAL: 128 MS
EKG Q-T INTERVAL: 334 MS
EKG QRS DURATION: 96 MS
EKG QTC CALCULATION (BAZETT): 449 MS
EKG R AXIS: 57 DEGREES
EKG T AXIS: 65 DEGREES
EKG VENTRICULAR RATE: 109 BPM
EOSINOPHILS RELATIVE PERCENT: 0 % (ref 1–4)
GFR AFRICAN AMERICAN: >60 ML/MIN
GFR NON-AFRICAN AMERICAN: >60 ML/MIN
GFR SERPL CREATININE-BSD FRML MDRD: ABNORMAL ML/MIN/{1.73_M2}
GFR SERPL CREATININE-BSD FRML MDRD: ABNORMAL ML/MIN/{1.73_M2}
GLUCOSE BLD-MCNC: 84 MG/DL (ref 70–99)
HCT VFR BLD CALC: 38.3 % (ref 36.3–47.1)
HEMOGLOBIN: 12.4 G/DL (ref 11.9–15.1)
IMMATURE GRANULOCYTES: 1 %
LYMPHOCYTES # BLD: 10 % (ref 24–43)
Lab: ABNORMAL
MAGNESIUM: 2 MG/DL (ref 1.6–2.6)
MCH RBC QN AUTO: 31.3 PG (ref 25.2–33.5)
MCHC RBC AUTO-ENTMCNC: 32.4 G/DL (ref 28.4–34.8)
MCV RBC AUTO: 96.7 FL (ref 82.6–102.9)
MONOCYTES # BLD: 5 % (ref 3–12)
NRBC AUTOMATED: 0.2 PER 100 WBC
PDW BLD-RTO: 14.6 % (ref 11.8–14.4)
PHOSPHORUS: 2.3 MG/DL (ref 2.6–4.5)
PLATELET # BLD: ABNORMAL K/UL (ref 138–453)
PLATELET ESTIMATE: ABNORMAL
PLATELET, FLUORESCENCE: 142 K/UL (ref 138–453)
PLATELET, IMMATURE FRACTION: 2.6 % (ref 1.1–10.3)
PMV BLD AUTO: ABNORMAL FL (ref 8.1–13.5)
POTASSIUM SERPL-SCNC: 3.3 MMOL/L (ref 3.7–5.3)
POTASSIUM SERPL-SCNC: 3.4 MMOL/L (ref 3.7–5.3)
RBC # BLD: 3.96 M/UL (ref 3.95–5.11)
RBC # BLD: ABNORMAL 10*6/UL
SEG NEUTROPHILS: 83 % (ref 36–65)
SEGMENTED NEUTROPHILS ABSOLUTE COUNT: 10.65 K/UL (ref 1.5–8.1)
SODIUM BLD-SCNC: 146 MMOL/L (ref 135–144)
SPECIMEN DESCRIPTION: ABNORMAL
STATUS: ABNORMAL
WBC # BLD: 12.8 K/UL (ref 3.5–11.3)
WBC # BLD: ABNORMAL 10*3/UL

## 2018-08-24 PROCEDURE — 2580000003 HC RX 258: Performed by: HOSPITALIST

## 2018-08-24 PROCEDURE — 2580000003 HC RX 258: Performed by: STUDENT IN AN ORGANIZED HEALTH CARE EDUCATION/TRAINING PROGRAM

## 2018-08-24 PROCEDURE — 6370000000 HC RX 637 (ALT 250 FOR IP): Performed by: HOSPITALIST

## 2018-08-24 PROCEDURE — 6360000002 HC RX W HCPCS: Performed by: EMERGENCY MEDICINE

## 2018-08-24 PROCEDURE — 82140 ASSAY OF AMMONIA: CPT

## 2018-08-24 PROCEDURE — 99291 CRITICAL CARE FIRST HOUR: CPT | Performed by: INTERNAL MEDICINE

## 2018-08-24 PROCEDURE — 6360000002 HC RX W HCPCS: Performed by: HOSPITALIST

## 2018-08-24 PROCEDURE — 2500000003 HC RX 250 WO HCPCS: Performed by: STUDENT IN AN ORGANIZED HEALTH CARE EDUCATION/TRAINING PROGRAM

## 2018-08-24 PROCEDURE — 82330 ASSAY OF CALCIUM: CPT

## 2018-08-24 PROCEDURE — C9113 INJ PANTOPRAZOLE SODIUM, VIA: HCPCS | Performed by: HOSPITALIST

## 2018-08-24 PROCEDURE — 6360000002 HC RX W HCPCS: Performed by: STUDENT IN AN ORGANIZED HEALTH CARE EDUCATION/TRAINING PROGRAM

## 2018-08-24 PROCEDURE — 36415 COLL VENOUS BLD VENIPUNCTURE: CPT

## 2018-08-24 PROCEDURE — 6370000000 HC RX 637 (ALT 250 FOR IP): Performed by: STUDENT IN AN ORGANIZED HEALTH CARE EDUCATION/TRAINING PROGRAM

## 2018-08-24 PROCEDURE — 94762 N-INVAS EAR/PLS OXIMTRY CONT: CPT

## 2018-08-24 PROCEDURE — 84132 ASSAY OF SERUM POTASSIUM: CPT

## 2018-08-24 PROCEDURE — 85025 COMPLETE CBC W/AUTO DIFF WBC: CPT

## 2018-08-24 PROCEDURE — 85055 RETICULATED PLATELET ASSAY: CPT

## 2018-08-24 PROCEDURE — 6360000002 HC RX W HCPCS: Performed by: INTERNAL MEDICINE

## 2018-08-24 PROCEDURE — 80048 BASIC METABOLIC PNL TOTAL CA: CPT

## 2018-08-24 PROCEDURE — 83735 ASSAY OF MAGNESIUM: CPT

## 2018-08-24 PROCEDURE — 99232 SBSQ HOSP IP/OBS MODERATE 35: CPT | Performed by: NURSE PRACTITIONER

## 2018-08-24 PROCEDURE — 2060000000 HC ICU INTERMEDIATE R&B

## 2018-08-24 PROCEDURE — 84100 ASSAY OF PHOSPHORUS: CPT

## 2018-08-24 RX ORDER — ASPIRIN 81 MG/1
81 TABLET, CHEWABLE ORAL DAILY
Status: DISCONTINUED | OUTPATIENT
Start: 2018-08-24 | End: 2018-08-31 | Stop reason: HOSPADM

## 2018-08-24 RX ORDER — ECHINACEA PURPUREA EXTRACT 125 MG
1 TABLET ORAL PRN
Status: DISCONTINUED | OUTPATIENT
Start: 2018-08-24 | End: 2018-08-31 | Stop reason: HOSPADM

## 2018-08-24 RX ORDER — FOLIC ACID 1 MG/1
1 TABLET ORAL DAILY
Status: DISCONTINUED | OUTPATIENT
Start: 2018-08-24 | End: 2018-08-31 | Stop reason: HOSPADM

## 2018-08-24 RX ORDER — POTASSIUM CHLORIDE 7.45 MG/ML
10 INJECTION INTRAVENOUS ONCE
Status: COMPLETED | OUTPATIENT
Start: 2018-08-24 | End: 2018-08-24

## 2018-08-24 RX ORDER — ROPINIROLE 1 MG/1
1 TABLET, FILM COATED ORAL 4 TIMES DAILY
Status: DISCONTINUED | OUTPATIENT
Start: 2018-08-24 | End: 2018-08-31 | Stop reason: HOSPADM

## 2018-08-24 RX ORDER — PANTOPRAZOLE SODIUM 20 MG/1
20 TABLET, DELAYED RELEASE ORAL
Status: DISCONTINUED | OUTPATIENT
Start: 2018-08-25 | End: 2018-08-31 | Stop reason: HOSPADM

## 2018-08-24 RX ORDER — MULTIVITAMIN WITH FOLIC ACID 400 MCG
1 TABLET ORAL DAILY
Status: DISCONTINUED | OUTPATIENT
Start: 2018-08-24 | End: 2018-08-31 | Stop reason: HOSPADM

## 2018-08-24 RX ADMIN — CLONAZEPAM 1 MG: 1 TABLET ORAL at 20:24

## 2018-08-24 RX ADMIN — PREGABALIN 150 MG: 75 CAPSULE ORAL at 07:53

## 2018-08-24 RX ADMIN — OXYCODONE HYDROCHLORIDE 10 MG: 5 TABLET ORAL at 21:34

## 2018-08-24 RX ADMIN — TRAZODONE HYDROCHLORIDE 50 MG: 50 TABLET ORAL at 20:24

## 2018-08-24 RX ADMIN — FENTANYL CITRATE 50 MCG: 50 INJECTION INTRAMUSCULAR; INTRAVENOUS at 14:41

## 2018-08-24 RX ADMIN — ROPINIROLE HYDROCHLORIDE 1 MG: 1 TABLET, FILM COATED ORAL at 16:07

## 2018-08-24 RX ADMIN — PANTOPRAZOLE SODIUM 40 MG: 40 INJECTION, POWDER, FOR SOLUTION INTRAVENOUS at 09:04

## 2018-08-24 RX ADMIN — Medication 10 ML: at 21:00

## 2018-08-24 RX ADMIN — CLONAZEPAM 1 MG: 1 TABLET ORAL at 08:51

## 2018-08-24 RX ADMIN — FOLIC ACID 1 MG: 1 TABLET ORAL at 10:20

## 2018-08-24 RX ADMIN — SODIUM CHLORIDE: 4.5 INJECTION, SOLUTION INTRAVENOUS at 05:36

## 2018-08-24 RX ADMIN — ROPINIROLE HYDROCHLORIDE 1 MG: 1 TABLET, FILM COATED ORAL at 07:53

## 2018-08-24 RX ADMIN — ROPINIROLE HYDROCHLORIDE 1 MG: 1 TABLET, FILM COATED ORAL at 12:12

## 2018-08-24 RX ADMIN — SALINE NASAL SPRAY 1 SPRAY: 1.5 SOLUTION NASAL at 22:42

## 2018-08-24 RX ADMIN — ASPIRIN 81 MG: 81 TABLET, CHEWABLE ORAL at 12:43

## 2018-08-24 RX ADMIN — FENTANYL CITRATE 50 MCG: 50 INJECTION INTRAMUSCULAR; INTRAVENOUS at 12:36

## 2018-08-24 RX ADMIN — FENTANYL CITRATE 50 MCG: 50 INJECTION INTRAMUSCULAR; INTRAVENOUS at 21:57

## 2018-08-24 RX ADMIN — FENTANYL CITRATE 100 MCG: 50 INJECTION, SOLUTION INTRAMUSCULAR; INTRAVENOUS at 00:14

## 2018-08-24 RX ADMIN — THERA TABS 1 TABLET: TAB at 10:21

## 2018-08-24 RX ADMIN — OXYCODONE HYDROCHLORIDE 10 MG: 5 TABLET ORAL at 00:14

## 2018-08-24 RX ADMIN — SODIUM PHOSPHATE, MONOBASIC, MONOHYDRATE 29.88 MMOL: 276; 142 INJECTION, SOLUTION INTRAVENOUS at 11:26

## 2018-08-24 RX ADMIN — SODIUM CHLORIDE: 4.5 INJECTION, SOLUTION INTRAVENOUS at 18:57

## 2018-08-24 RX ADMIN — ONDANSETRON HYDROCHLORIDE 4 MG: 2 INJECTION, SOLUTION INTRAMUSCULAR; INTRAVENOUS at 16:35

## 2018-08-24 RX ADMIN — FENTANYL CITRATE 50 MCG: 50 INJECTION INTRAMUSCULAR; INTRAVENOUS at 17:31

## 2018-08-24 RX ADMIN — SODIUM CHLORIDE 3 G: 900 INJECTION INTRAVENOUS at 08:30

## 2018-08-24 RX ADMIN — THIAMINE HYDROCHLORIDE 100 MG: 100 INJECTION, SOLUTION INTRAMUSCULAR; INTRAVENOUS at 05:37

## 2018-08-24 RX ADMIN — FENTANYL CITRATE 50 MCG: 50 INJECTION INTRAMUSCULAR; INTRAVENOUS at 20:40

## 2018-08-24 RX ADMIN — SODIUM CHLORIDE 3 G: 900 INJECTION INTRAVENOUS at 21:06

## 2018-08-24 RX ADMIN — ENOXAPARIN SODIUM 40 MG: 40 INJECTION SUBCUTANEOUS at 12:43

## 2018-08-24 RX ADMIN — SODIUM CHLORIDE 3 G: 900 INJECTION INTRAVENOUS at 02:58

## 2018-08-24 RX ADMIN — POTASSIUM CHLORIDE 10 MEQ: 10 INJECTION, SOLUTION INTRAVENOUS at 19:51

## 2018-08-24 RX ADMIN — THIAMINE HYDROCHLORIDE 150 MG: 100 INJECTION, SOLUTION INTRAMUSCULAR; INTRAVENOUS at 10:18

## 2018-08-24 RX ADMIN — ROPINIROLE HYDROCHLORIDE 1 MG: 1 TABLET, FILM COATED ORAL at 20:24

## 2018-08-24 RX ADMIN — SODIUM CHLORIDE 3 G: 900 INJECTION INTRAVENOUS at 14:21

## 2018-08-24 RX ADMIN — PREGABALIN 150 MG: 75 CAPSULE ORAL at 20:24

## 2018-08-24 RX ADMIN — OXYCODONE HYDROCHLORIDE 10 MG: 5 TABLET ORAL at 08:51

## 2018-08-24 RX ADMIN — FENTANYL CITRATE 50 MCG: 50 INJECTION INTRAMUSCULAR; INTRAVENOUS at 10:26

## 2018-08-24 RX ADMIN — FENTANYL CITRATE 50 MCG: 50 INJECTION INTRAMUSCULAR; INTRAVENOUS at 18:54

## 2018-08-24 RX ADMIN — Medication 2 MG: at 21:35

## 2018-08-24 ASSESSMENT — PAIN SCALES - GENERAL
PAINLEVEL_OUTOF10: 4
PAINLEVEL_OUTOF10: 6
PAINLEVEL_OUTOF10: 10
PAINLEVEL_OUTOF10: 9
PAINLEVEL_OUTOF10: 10
PAINLEVEL_OUTOF10: 3
PAINLEVEL_OUTOF10: 6
PAINLEVEL_OUTOF10: 10
PAINLEVEL_OUTOF10: 7
PAINLEVEL_OUTOF10: 6

## 2018-08-24 NOTE — PROGRESS NOTES
intracranial abnormality     ECHO (8/22/2018): EF 55-60%. LA moderately dilated. Mild MR & TR. Dilated IVC with poor inspiratory collapse                              IMPRESSION & PLAN: 66 y.o.  female admitted with  Toxic metabolic encephalopathy in the setting of pneumonia, hyperammonemia & hypercapnic respiratory failure; s/p extubation (8/22); is on nasal Oxygen.  Pt was alert & fully oriented; moving all limbs equally & following simple commands     Pneumonia; is on Unasyn    NSVT; run of A fib; continue ASA 81 mg QD, as per cardiology    RLS; is on Requip 1 mg QID PO    Continue PT/OT    Comorbid conditions - depression, OD    Will follow

## 2018-08-24 NOTE — PROGRESS NOTES
[address] [facility]  Speech Language Pathology    Date: 8/24/2018  Patient Name: Salma Senior  YOB: 1955   AGE: 66 y.o. MRN: [id number]        Patient Not Available for Speech Therapy     Due to:  [] Testing  [] Hemodialysis  [] Cancelled by RN  [] Surgery   [] Intubation/Sedation/Pain Medication  [] Medical instability  [x] Other:  Per RN refusing PO at this time, tired and sleeping for the first time in awhile. Next scheduled treatment: 8/25/18  Completed by:  Julia Waters M.S. [id number] [facility]

## 2018-08-24 NOTE — PROGRESS NOTES
08/21/18   1031  08/21/18   1244   TROPONINI  0.06  0.08     BNP: No results for input(s): BNP in the last 72 hours. Lipids: No results for input(s): CHOL, HDL in the last 72 hours. Invalid input(s): LDLCALCU  INR:   Recent Labs      08/21/18   1702   INR  1.1       Objective:   Vitals: BP (!) 122/104   Pulse 54   Temp 98.8 °F (37.1 °C) (Axillary)   Resp 15   Ht 5' 7\" (1.702 m)   Wt 202 lb 2.6 oz (91.7 kg)   SpO2 94%   BMI 31.66 kg/m²     General appearance: appears well, no  Distress. Alert and oriented to self only  HEENT: Head: Normocephalic, no lesions, without obvious abnormality  Neck: no JVD  Lungs: clear to auscultation bilaterally, no basilar rales, no wheezing   Heart: regular rate and rhythm, S1, S2 normal, no murmur, click, rub or gallop  Abdomen: soft, non-tender; bowel sounds normal  Extremities: No LE edema  Neurologic: alert and oriented to self only. Echocardiogram: 08/22/18  Summary  Left ventricle is normal in size with normal systolic function. Estimated ejection fraction is 55-60%. Due to the technical limitations of this study not all wall segments were  visualized. Left atrium is moderately dilated. Normal right ventricular size and function. Mitral valve structure is normal.  Mild mitral regurgitation. Mild tricuspid regurgitation. Estimated right ventricular systolic pressure is 45 mmHg. Normal aortic root dimension. Dilated IVC with poor inspiratory collapse.     [id number] Score for Atrial Fibrillation Stroke Risk   Risk   Factors  Component Value   C CHF No 0   H HTN No 0   A2 Age >= 76 No,  (64 y.o.) 0   D DM No 0   S2 Prior Stroke/TIA No 0   V Vascular Disease No 0   A Age 74-69 No,  (64 y.o.) 0   Sc Sex female 1    [id number]  Score  1   Score last updated 0/69/38 74:88 PM    Click here for a link to the UpToDate guideline \"Atrial Fibrillation: Anticoagulation therapy to prevent embolization    Disclaimer: Risk Score calculation is dependent on accuracy of patient problem list and past encounter diagnosis. Assessment:     1. Reported  NSVT, rhythm strip not available to review- likely secondary to metabolic derangement  2. Encephalopathy-improved  3. Aspiration pneumonia  5. Dehydration  7. Hypokalemia on admission  8. KATELYNN  9. Leukocytosis  10. Hypertension  11. Hypophosphatemia  12. Sinuses pauses of 2.5 to 3 second, run of Afib. Tahycbrady      1. Treatment Plan:     2. Replace lytes  3. Keep potassium >4 and mag >2  4. Start on aspirin  5. EP consult for Sinuses pauses of 2.5 to 3 second, runs of Afib. Tahycbrady  6. Will need stress test outpatient      Discussed with patient and nursing. [facility]   [address]     I performed a history and physical examination of the patient and discussed management with the resident. I reviewed the residents note and agree with the documented findings and plan of care. Any areas of disagreement are noted on the chart. I was personally present for the key portions of any procedures. I have documented in the chart those procedures where I was not present during the key portions. I have personally evaluated this patient and have completed at least one if not all key elements of the E/M (history, physical exam, and MDM). Additional findings are as noted.     Jarrod Saucedo MD

## 2018-08-24 NOTE — PLAN OF CARE
Problem: Nutrition  Goal: Optimal nutrition therapy  Outcome: Ongoing  Nutrition Problem: Inadequate oral intake  Intervention: Food and/or Nutrient Delivery: Continue current diet, Start ONS. Encourage intake as tolerated.    Nutritional Goals: meet % of estimated nutrition needs

## 2018-08-24 NOTE — PROGRESS NOTES
Critical Care Team - Daily Progress Note      Date and time: 2018 2:40 PM  Patient's name: Gunjan Aguayo Record Number: [id number]  Patient's account/billing number: [de-identified]  Patient's YOB: 1955  Age: 66 y.o. Date of Admission: 2018 10:22 AM  Length of stay during current admission: 3      Primary Care Physician: Sharif Kilpatrick DO  ICU Attending Physician: Dr. Mya Contreras    Code Status: Full Code    Reason for ICU admission:  encephalopathy, KTAELYNN, hyperkalemia      SUBJECTIVE:     OVERNIGHT EVENTS:       Mentation: Stable overnight. No acute events. VSS. Patient distressed due to restless legs. Fever:-  Temp (24hrs), Av.4 °F (36.9 °C), Min:98.1 °F (36.7 °C), Max:98.8 °F (84.7 °C)    Systolic (77DFL), EAO:111 , Min:122 , ZCP:874     Diastolic (76AWP), OGW:55, Min:57, Max:104    Urine output in the last 24 hours:  In: 4182 [I.V.:1574]  Out: 750 [Urine:750]  Patient Vitals for the past 96 hrs (Last 3 readings):   Weight   18 0717 202 lb 2.6 oz (91.7 kg)   18 0557 205 lb 14.6 oz (93.4 kg)   18 1545 196 lb 3.4 oz (89 kg)       AWAKE & FOLLOWING COMMANDS:  [] No   [x] Yes    CURRENT VENTILATION STATUS:     [] Ventilator  [] BIPAP  [] Nasal Cannula [x] Room Air        SECRETIONS Amount:  [] Small [] Moderate  [] Large  [x] None  Color:     [] White [] Colored  [] Bloody    SEDATION:  RAAS Score:  [] Propofol gtt  [] Versed gtt  [] Ativan gtt   [x] No Sedation    PARALYZED:  [x] No    [] Yes    DIARRHEA:                [x] No                [] Yes  (C. Difficile status: [] positive                                                                                                                       [] negative                                                                                                                     [] pending)    VASOPRESSORS:  [x] No    [] Yes    If yes -   [] Levophed       [] Dopamine     [] Vasopressin       [] Dobutamine  [] pneumonia ([facility] [address] [address]  ·   Non-traumatic rhabdomyolysis  ·   Acute metabolic encephalopathy  ·   Unresponsive  ·   Arterial hypotension  ·   Hypoxic ischemic encephalopathy  ·   Hypernatremia  ·   Vitamin D deficiency  · Resolved Problems:  ·   Hypovolemic shock (HCC)  ·       PLAN:     WEAN PER PROTOCOL:  [] No   [] Yes  [x] N/A    DISCONTINUE ANY LABS:   [x] No   [] Yes    ICU PROPHYLAXIS:  Stress ulcer:  [x] PPI Agent  [] [id number] [] Sucralfate  [] Other:  VTE:   [] Enoxaparin  [] Unfract. Heparin Subcut  [x] EPC Cuffs    NUTRITION:  [] NPO [] Tube Feeding (Specify: ) [] TPN  [x] PO (Diet: DIET GENERAL;  Dietary Nutrition Supplements: Standard High Calorie Oral Supplement)    HOME MEDICATIONS RECONCILED: [] No  [x] Yes    INSULIN DRIP:   [x] No   [] Yes    CONSULTATION NEEDED:  [] No   [x] Yes    FAMILY UPDATED:    [] No   [x] Yes    TRANSFER OUT OF ICU:   [] No   [x] Yes     PLAN:    1. Neuro :   -AOx3   -Distressed because of restless legs. Known case. -Clonazepam, ropnirole and pregabalin to continue.  -Analgesia - fentanyl  prn    2. CVS :   - patient has Tachy- Home arrhythmia, sinus pauses of 2- 3 seconds and run of A-FIB. - cardiology on board. Appreciate recommendations. -per cardiology - consult with electrophysiology needed. -Replace electrolytes as needed. - keep K> 4 and Mg > 2  - start on aspirin.  - o/p stress testing. 3. GI Prophylaxis :   -pantoprazole  -Continue regular diet. 4. thromboprophylaxis : PCD's    5. Renal :   - patient had Na levels : 146.  - continue half normal saline. 50 ml/hr    6. Hemat :  RBC continues to be low. Can be replaced with supplements if continues to be low.  -patients anemia contributes to restless legs    7. Additional :   -replace electrolytes as needed. -Monitor hydration.  -Monitor vitals. Disposition : appropriate for transfer to Med-surg. Lynnette Weiss M.D.              Critical care resident,  Department of Internal Medicine/ [address] [place] [address] [address] [facility] ([facility])             8/24/2018, 2:40 PM

## 2018-08-25 LAB
ABSOLUTE EOS #: 0.36 K/UL (ref 0–0.44)
ABSOLUTE IMMATURE GRANULOCYTE: 0.07 K/UL (ref 0–0.3)
ABSOLUTE LYMPH #: 1.3 K/UL (ref 1.1–3.7)
ABSOLUTE MONO #: 0.56 K/UL (ref 0.1–1.2)
ANION GAP SERPL CALCULATED.3IONS-SCNC: 13 MMOL/L (ref 9–17)
BASOPHILS # BLD: 1 % (ref 0–2)
BASOPHILS ABSOLUTE: 0.05 K/UL (ref 0–0.2)
BUN BLDV-MCNC: 9 MG/DL (ref 8–23)
BUN/CREAT BLD: ABNORMAL (ref 9–20)
CALCIUM IONIZED: 1.12 MMOL/L (ref 1.13–1.33)
CALCIUM SERPL-MCNC: 8.5 MG/DL (ref 8.6–10.4)
CHLORIDE BLD-SCNC: 102 MMOL/L (ref 98–107)
CO2: 29 MMOL/L (ref 20–31)
CREAT SERPL-MCNC: 0.62 MG/DL (ref 0.5–0.9)
DIFFERENTIAL TYPE: ABNORMAL
EOSINOPHILS RELATIVE PERCENT: 4 % (ref 1–4)
GFR AFRICAN AMERICAN: >60 ML/MIN
GFR NON-AFRICAN AMERICAN: >60 ML/MIN
GFR SERPL CREATININE-BSD FRML MDRD: ABNORMAL ML/MIN/{1.73_M2}
GFR SERPL CREATININE-BSD FRML MDRD: ABNORMAL ML/MIN/{1.73_M2}
GLUCOSE BLD-MCNC: 119 MG/DL (ref 65–105)
GLUCOSE BLD-MCNC: 78 MG/DL (ref 70–99)
GLUCOSE BLD-MCNC: 82 MG/DL (ref 65–105)
GLUCOSE BLD-MCNC: 95 MG/DL (ref 65–105)
HCT VFR BLD CALC: 41.5 % (ref 36.3–47.1)
HEMOGLOBIN: 13.8 G/DL (ref 11.9–15.1)
IMMATURE GRANULOCYTES: 1 %
LYMPHOCYTES # BLD: 13 % (ref 24–43)
MAGNESIUM: 2.1 MG/DL (ref 1.6–2.6)
MCH RBC QN AUTO: 31.4 PG (ref 25.2–33.5)
MCHC RBC AUTO-ENTMCNC: 33.3 G/DL (ref 28.4–34.8)
MCV RBC AUTO: 94.5 FL (ref 82.6–102.9)
MONOCYTES # BLD: 6 % (ref 3–12)
NRBC AUTOMATED: 0 PER 100 WBC
PDW BLD-RTO: 13.6 % (ref 11.8–14.4)
PHOSPHORUS: 3.4 MG/DL (ref 2.6–4.5)
PLATELET # BLD: 160 K/UL (ref 138–453)
PLATELET ESTIMATE: ABNORMAL
PMV BLD AUTO: 10.5 FL (ref 8.1–13.5)
POTASSIUM SERPL-SCNC: 3.7 MMOL/L (ref 3.7–5.3)
RBC # BLD: 4.39 M/UL (ref 3.95–5.11)
RBC # BLD: ABNORMAL 10*6/UL
SEG NEUTROPHILS: 75 % (ref 36–65)
SEGMENTED NEUTROPHILS ABSOLUTE COUNT: 7.49 K/UL (ref 1.5–8.1)
SODIUM BLD-SCNC: 144 MMOL/L (ref 135–144)
WBC # BLD: 9.8 K/UL (ref 3.5–11.3)
WBC # BLD: ABNORMAL 10*3/UL

## 2018-08-25 PROCEDURE — G8998 SWALLOW D/C STATUS: HCPCS

## 2018-08-25 PROCEDURE — 82330 ASSAY OF CALCIUM: CPT

## 2018-08-25 PROCEDURE — 6370000000 HC RX 637 (ALT 250 FOR IP): Performed by: STUDENT IN AN ORGANIZED HEALTH CARE EDUCATION/TRAINING PROGRAM

## 2018-08-25 PROCEDURE — 99233 SBSQ HOSP IP/OBS HIGH 50: CPT | Performed by: INTERNAL MEDICINE

## 2018-08-25 PROCEDURE — 94762 N-INVAS EAR/PLS OXIMTRY CONT: CPT

## 2018-08-25 PROCEDURE — G8997 SWALLOW GOAL STATUS: HCPCS

## 2018-08-25 PROCEDURE — 6360000002 HC RX W HCPCS: Performed by: STUDENT IN AN ORGANIZED HEALTH CARE EDUCATION/TRAINING PROGRAM

## 2018-08-25 PROCEDURE — 6370000000 HC RX 637 (ALT 250 FOR IP): Performed by: EMERGENCY MEDICINE

## 2018-08-25 PROCEDURE — 2060000000 HC ICU INTERMEDIATE R&B

## 2018-08-25 PROCEDURE — G8996 SWALLOW CURRENT STATUS: HCPCS

## 2018-08-25 PROCEDURE — 6370000000 HC RX 637 (ALT 250 FOR IP): Performed by: HOSPITALIST

## 2018-08-25 PROCEDURE — 97110 THERAPEUTIC EXERCISES: CPT

## 2018-08-25 PROCEDURE — 36415 COLL VENOUS BLD VENIPUNCTURE: CPT

## 2018-08-25 PROCEDURE — G8978 MOBILITY CURRENT STATUS: HCPCS

## 2018-08-25 PROCEDURE — 80048 BASIC METABOLIC PNL TOTAL CA: CPT

## 2018-08-25 PROCEDURE — 6360000002 HC RX W HCPCS: Performed by: HOSPITALIST

## 2018-08-25 PROCEDURE — 85025 COMPLETE CBC W/AUTO DIFF WBC: CPT

## 2018-08-25 PROCEDURE — 84100 ASSAY OF PHOSPHORUS: CPT

## 2018-08-25 PROCEDURE — 6360000002 HC RX W HCPCS: Performed by: INTERNAL MEDICINE

## 2018-08-25 PROCEDURE — 82947 ASSAY GLUCOSE BLOOD QUANT: CPT

## 2018-08-25 PROCEDURE — 99232 SBSQ HOSP IP/OBS MODERATE 35: CPT | Performed by: NURSE PRACTITIONER

## 2018-08-25 PROCEDURE — 2580000003 HC RX 258: Performed by: HOSPITALIST

## 2018-08-25 PROCEDURE — 83735 ASSAY OF MAGNESIUM: CPT

## 2018-08-25 PROCEDURE — 97162 PT EVAL MOD COMPLEX 30 MIN: CPT

## 2018-08-25 PROCEDURE — 92610 EVALUATE SWALLOWING FUNCTION: CPT

## 2018-08-25 PROCEDURE — G8980 MOBILITY D/C STATUS: HCPCS

## 2018-08-25 PROCEDURE — G8979 MOBILITY GOAL STATUS: HCPCS

## 2018-08-25 PROCEDURE — 2580000003 HC RX 258: Performed by: STUDENT IN AN ORGANIZED HEALTH CARE EDUCATION/TRAINING PROGRAM

## 2018-08-25 RX ADMIN — FOLIC ACID 1 MG: 1 TABLET ORAL at 08:06

## 2018-08-25 RX ADMIN — OXYCODONE HYDROCHLORIDE 10 MG: 5 TABLET ORAL at 00:59

## 2018-08-25 RX ADMIN — Medication 2 MG: at 20:13

## 2018-08-25 RX ADMIN — OXYCODONE HYDROCHLORIDE 5 MG: 5 TABLET ORAL at 16:44

## 2018-08-25 RX ADMIN — MIRTAZAPINE 30 MG: 30 TABLET, FILM COATED ORAL at 00:28

## 2018-08-25 RX ADMIN — ASPIRIN 81 MG: 81 TABLET, CHEWABLE ORAL at 08:06

## 2018-08-25 RX ADMIN — ROPINIROLE HYDROCHLORIDE 1 MG: 1 TABLET, FILM COATED ORAL at 08:06

## 2018-08-25 RX ADMIN — FENTANYL CITRATE 50 MCG: 50 INJECTION INTRAMUSCULAR; INTRAVENOUS at 00:58

## 2018-08-25 RX ADMIN — MIRTAZAPINE 30 MG: 30 TABLET, FILM COATED ORAL at 20:14

## 2018-08-25 RX ADMIN — OXYCODONE HYDROCHLORIDE 10 MG: 5 TABLET ORAL at 05:58

## 2018-08-25 RX ADMIN — SODIUM CHLORIDE 3 G: 900 INJECTION INTRAVENOUS at 16:12

## 2018-08-25 RX ADMIN — POTASSIUM CHLORIDE 40 MEQ: 40 SOLUTION ORAL at 00:58

## 2018-08-25 RX ADMIN — THERA TABS 1 TABLET: TAB at 08:06

## 2018-08-25 RX ADMIN — ENOXAPARIN SODIUM 40 MG: 40 INJECTION SUBCUTANEOUS at 08:06

## 2018-08-25 RX ADMIN — THIAMINE HYDROCHLORIDE 250 MG: 100 INJECTION, SOLUTION INTRAMUSCULAR; INTRAVENOUS at 05:58

## 2018-08-25 RX ADMIN — SODIUM CHLORIDE 3 G: 900 INJECTION INTRAVENOUS at 02:59

## 2018-08-25 RX ADMIN — CLONAZEPAM 1 MG: 1 TABLET ORAL at 20:14

## 2018-08-25 RX ADMIN — ROPINIROLE HYDROCHLORIDE 1 MG: 1 TABLET, FILM COATED ORAL at 20:14

## 2018-08-25 RX ADMIN — SODIUM CHLORIDE 3 G: 900 INJECTION INTRAVENOUS at 20:14

## 2018-08-25 RX ADMIN — SODIUM CHLORIDE 3 G: 900 INJECTION INTRAVENOUS at 09:59

## 2018-08-25 RX ADMIN — PREGABALIN 150 MG: 75 CAPSULE ORAL at 20:13

## 2018-08-25 RX ADMIN — ROPINIROLE HYDROCHLORIDE 1 MG: 1 TABLET, FILM COATED ORAL at 13:10

## 2018-08-25 RX ADMIN — ROPINIROLE HYDROCHLORIDE 1 MG: 1 TABLET, FILM COATED ORAL at 16:44

## 2018-08-25 RX ADMIN — CLONAZEPAM 1 MG: 1 TABLET ORAL at 08:06

## 2018-08-25 RX ADMIN — PANTOPRAZOLE SODIUM 20 MG: 20 TABLET, DELAYED RELEASE ORAL at 06:02

## 2018-08-25 RX ADMIN — TRAZODONE HYDROCHLORIDE 50 MG: 50 TABLET ORAL at 20:13

## 2018-08-25 RX ADMIN — PREGABALIN 150 MG: 75 CAPSULE ORAL at 08:06

## 2018-08-25 ASSESSMENT — PAIN SCALES - GENERAL
PAINLEVEL_OUTOF10: 3
PAINLEVEL_OUTOF10: 4
PAINLEVEL_OUTOF10: 8
PAINLEVEL_OUTOF10: 10
PAINLEVEL_OUTOF10: 4

## 2018-08-25 NOTE — PROGRESS NOTES
Pulse 87   Temp 97.5 °F (36.4 °C) (Oral)   Resp 15   Ht 5' 7\" (1.702 m)   Wt 202 lb 2.6 oz (91.7 kg)   SpO2 90%   BMI 31.66 kg/m²   24 hour intake/output:  Intake/Output Summary (Last 24 hours) at 08/25/18 0141  Last data filed at 08/25/18 0000   Gross per 24 hour   Intake             1882 ml   Output             1900 ml   Net              -18 ml     Last 3 weights: Wt Readings from Last 3 Encounters:   08/24/18 202 lb 2.6 oz (91.7 kg)       General appearance: alert and cooperative with exam  Constitutional: Appears well, no distress  Neck: Supple, symmetrical, trachea midline, no adenopathy,  no jvd, skin normal  Respiratory: clear to auscultation, no wheezes or rales and unlabored breathing.  No intercostal tenderness  Cardiovascular: regular rate and rhythm, normal S1, S2, no murmur noted  Abdomen: soft, nontender, nondistended, no masses or organomegaly  Extremities:   no pedal edema, no clubbing or cyanosis  Skin: abrasions to L thigh  Neuro: alert and oriented only to name; CNII-XII grossly intact     Medications:Current Inpatient  Scheduled Meds:   thiamine (VITAMIN B1) IVPB  250 mg Intravenous Q24H    rOPINIRole  1 mg Oral 4x Daily    folic acid  1 mg Oral Daily    multivitamin  1 tablet Oral Daily    enoxaparin  40 mg Subcutaneous Daily    pantoprazole  20 mg Oral QAM AC    aspirin  81 mg Oral Daily    traZODone  50 mg Oral Nightly    mirtazapine  30 mg Oral Nightly    clonazePAM  1 mg Oral BID    vitamin D  50,000 Units Oral Weekly    ampicillin-sulbactam  3 g Intravenous Q6H    pregabalin  150 mg Oral BID    sodium chloride flush  10 mL Intravenous 2 times per day     Continuous Infusions:   sodium chloride 50 mL/hr at 08/24/18 1857    dextrose       PRN Meds:sodium chloride, sodium phosphate IVPB **OR** sodium phosphate IVPB, potassium chloride **OR** potassium chloride **OR** potassium chloride, magnesium sulfate, fentanNYL **OR** fentanNYL, oxyCODONE **OR** oxyCODONE,

## 2018-08-25 NOTE — PROGRESS NOTES
Tele sitter initiated  for pt safety. Pt. Removing oxygen and her saturations were dropping. Will continue to monitor.   Electronically signed by Marisela Frances RN on 8/25/2018 at 10:15 AM

## 2018-08-25 NOTE — PROGRESS NOTES
Speech Language Pathology  Facility/Department: [facility] 4B STEPDOWN   BEDSIDE SWALLOW EVALUATION    NAME: Radha Sanabria  : 1955  MRN: [id number]    ADMISSION DATE: 2018     ADMITTING DIAGNOSIS: has Encephalopathy acute; Hyperglycemia; Hypokalemia; KATELYNN (acute kidney injury) ([address] Dehydration; NSVT (nonsustained ventricular tachycardia) ([address] Depression; Aspiration pneumonia ([address] Non-traumatic rhabdomyolysis; Acute metabolic encephalopathy; Unresponsive; Arterial hypotension; Hypoxic ischemic encephalopathy; Hypernatremia; and Vitamin D deficiency on her problem list.     ONSET DATE: 2018      Recent Chest Xray/CT of Chest:   XR Chest 18  1. Expected positions of medical support devices. 2. Nonspecific prominence of the interstitial/pulmonary vascular markings can   be seen in setting of vascular congestion, chronic interstitial change and/or   atypical pneumonitis.  No focal airspace consolidation     XR Chest 18  1. Slight interval improvement in right mid and right lower lung pulmonary   opacities.  Findings concerning for underlying pulmonary interstitial edema. Continued radiographic follow-up to resolution is recommended.       2. Tip of the endotracheal tube is above the keyon at the level of the   clavicular heads.  Additional support hardware as detailed above. Date of Eval: 2018  Evaluating Therapist: Howard Blas       Current Diet level: General Diet; high  Calorie supplement         Primary Complaint   Radha Sanabria, a 65 yo woman was admitted to [facility]. According to medical records, pt was found unresponsive at home. She was dx with toxic metabolic encephalopathy with PNA, hyperammonemia with hypercapnic respiratory failure. She was intubated 18 -18          IMPRESSIONS  1) No overt s/s of aspiration observed via [place] 3 oz Water Protocol.      2) Per RN, pt is able to swallow medications with water w/o overt s/s of

## 2018-08-25 NOTE — CARE COORDINATION
Transition planning  Met with pt and family to discuss plans - will need SNF before transition to home - list of facilities provided. Requested choice soon.

## 2018-08-25 NOTE — PROGRESS NOTES
Physical Therapy    Facility/Department: [facility] 4B STEPDOWN  Initial Assessment    NAME: Aneta Joaquin  : 1955  MRN: [id number]    Date of Service: 2018  Patient admitted with encephalopathy associated with acute respiratory failure with hypoxemia and hypercapnia  Discharge Recommendations:  Continue to assess pending progress, Subacute/Skilled Nursing Facility        Patient Diagnosis(es): The primary encounter diagnosis was Unresponsive. Diagnoses of Other specified hypotension, Elevated lactic acid level, and Acute kidney injury ([address] were also pertinent to this visit. has a past medical history of Degenerative disc disease, lumbar. has a past surgical history that includes Tonsillectomy (Bilateral, ) and eye surgery (). Restrictions  Position Activity Restriction  Other position/activity restrictions: bipap-just switched to n/c-confused and lethargic  Vision/Hearing        Subjective  General  Family / Caregiver Present: No  Follows Commands: Impaired  Pain Screening  Patient Currently in Pain: Denies  Pain Assessment  Pain Assessment: FLACC  Pain Level: 3  Vital Signs  Patient Currently in Pain: Denies  Pre Treatment Pain Screening  Pain at present: 3  Scale Used: FLACC  Intervention List: Patient able to continue with treatment  Comments / Details: nurse present for part of eval-given meds    Orientation  Orientation  Overall Orientation Status: Impaired  Orientation Level: Oriented to person;Oriented to place; Disoriented to situation;Disoriented to time    Social/Functional History  Social/Functional History  Lives With: Alone  Type of Home: House  Bathroom Toilet: Standard  Bathroom Accessibility: Accessible  ADL Assistance: Independent  Homemaking Assistance: Independent  Homemaking Responsibilities: Yes  Ambulation Assistance: Independent  Transfer Assistance: Independent  Active : Yes  Occupation: Full time employment  Additional Comments: pt lethargic-answering some

## 2018-08-25 NOTE — CONSULTS
mg by mouth daily   Yes Historical Provider, MD   pregabalin (LYRICA) 150 MG capsule Take 150 mg by mouth 2 times daily. .   Yes Historical Provider, MD   clonazePAM (KLONOPIN) 1 MG tablet Take 1 mg by mouth daily. Jenelle Kehr Historical Provider, MD       Allergies:  Patient has no known allergies. Social History:   reports that she has been smoking Cigarettes. She has a 6.00 pack-year smoking history. She has never used smokeless tobacco. She reports that she does not drink alcohol or use drugs. Family History:   Positive for early CAD    REVIEW OF SYSTEMS:    · Constitutional: there has been no unanticipated weight loss. There's been No change in energy level, No change in activity level. · Eyes: No visual changes or diplopia. No scleral icterus. · ENT: No Headaches, hearing loss or vertigo. No mouth sores or sore throat. · Cardiovascular: No problem  · Respiratory: No previous reported problems  · Gastrointestinal: No abdominal pain, appetite loss, blood in stools. No change in bowel or bladder habits. · Genitourinary: No dysuria, trouble voiding, or hematuria. · Musculoskeletal:  No gait disturbance, No weakness or joint complaints. · Integumentary: No rash or pruritis. · Neurological: No headache, diplopia, change in muscle strength, numbness or tingling. No change in gait, balance, coordination, mood, affect, memory, mentation, behavior. · Psychiatric: No anxiety, or depression. · Endocrine: No temperature intolerance. No excessive thirst, fluid intake, or urination. No tremor. · Hematologic/Lymphatic: No abnormal bruising or bleeding, blood clots or swollen lymph nodes. · Allergic/Immunologic: No nasal congestion or hives.     PHYSICAL EXAM:    Physical Examination:    /61   Pulse 85   Temp 98.5 °F (36.9 °C) (Oral)   Resp 12   Ht 5' 7\" (1.702 m)   Wt 206 lb 2.1 oz (93.5 kg)   SpO2 93%   BMI 32.28 kg/m²    Constitutional and General Appearance: alert, cooperative, no distress and appears stated age  [de-identified]: PERRL, no cervical lymphadenopathy. No masses palpable. Normal oral mucosa  Respiratory:  · Normal excursion and expansion without use of accessory muscles  · Resp Auscultation: Good respiratory effort. No for increased work of breathing. On auscultation: clear to auscultation bilaterally  Cardiovascular:  · The apical impulse is not displaced  · Heart tones are crisp and normal. regular S1 and S2. Murmurs:  None  · Jugular venous pulsation Normal  · The carotid upstroke is normal in amplitude and contour without delay or bruit  · Peripheral pulses are symmetrical and full   Abdomen:  · No masses or tenderness  · Bowel sounds present  Extremities:  ·  No Cyanosis or Clubbing  ·  Lower extremity edema: Trace  ·  Skin: Warm and dry  Neurological:  · Alert and oriented. · Moves all extremities well  · No abnormalities of mood, affect, memory, mentation, or behavior are noted    DATA:    Diagnostics:      EKG: ( 8/23/18)  Sinus tachycardia, 109 bpm; non-specific ST and T wave abnormality  . Labs:     CBC:   Recent Labs      08/24/18   0533  08/25/18   0616   WBC  12.8*  9.8   HGB  12.4  13.8   HCT  38.3  41.5   PLT  See Reflexed IPF Result  160     BMP:   Recent Labs      08/24/18   0533  08/24/18   2316  08/25/18   0616   NA  146*   --   144   K  3.3*  3.4*  3.7   CO2  27   --   29   BUN  10   --   9   CREATININE  0.73   --   0.62   LABGLOM  >60   --   >60   GLUCOSE  84   --   78     BNP: No results for input(s): BNP in the last 72 hours. PT/INR: No results for input(s): PROTIME, INR in the last 72 hours. APTT:No results for input(s): APTT in the last 72 hours. CARDIAC ENZYMES:No results for input(s): CKTOTAL, CKMB, CKMBINDEX, TROPONINI in the last 72 hours. FASTING LIPID PANEL:No results found for: HDL, LDLDIRECT, LDLCALC, TRIG  LIVER PROFILE:No results for input(s): AST, ALT, LABALBU in the last 72 hours. IMPRESSION:    1. Acute encephalopathy, improving   2.   Pneumonia and

## 2018-08-25 NOTE — PROGRESS NOTES
[] None drawn      [] Negative             []  Positive (Details:  )  Sputum Culture:               [] None drawn       [] Negative             []  Positive (Details:  )   Endotracheal aspirate:     [] None drawn       [] Negative             []  Positive (Details:  )       Consults:     Cardiology     Assessment:     Principal Problem:    Encephalopathy acute  Active Problems:    Hyperglycemia    Hypokalemia    KATELYNN (acute kidney injury) ([facility] [place] [address]    Dehydration    NSVT (nonsustained ventricular tachycardia) ([facility])    Depression    Aspiration pneumonia (HCC)    Non-traumatic rhabdomyolysis    Acute metabolic encephalopathy    Unresponsive    Arterial hypotension    Hypoxic ischemic encephalopathy    Hypernatremia    Vitamin D deficiency  Resolved Problems:    Hypovolemic shock (HCC)        Recommended Follow-up:     On Unasyn for aspiration pneumonia  Continue clonazepam, ropinirole and pregabalin at present dose  Follow cardiology and electrophysiology recommendations  Outpatient stress test  Monitor and replace electrolytes as needed  Protonix for GI prophylaxis  Heparin for DVT prophylaxis      Above mentioned assessment and plan was discussed by me with the admitting medicine resident. The medicine team assigned to the patient by medicine admitting resident will be following up the patient from now onwards on the floor.      Alana Ramos M.D.  PGY-3 IM Resident   8/24/2018, 11:47 PM

## 2018-08-25 NOTE — PROGRESS NOTES
hours. Hepatic: No results for input(s): ALKPHOS, ALT, AST, PROT, BILITOT, BILIDIR, LABALBU in the last 72 hours. Amylase and Lipase:No results for input(s): LACTA, AMYLASE in the last 72 hours. Lactic Acid: No results for input(s): LACTA in the last 72 hours. CARDIAC ENZYMES:No results for input(s): CKTOTAL, CKMB, CKMBINDEX, TROPONINI in the last 72 hours. BNP: No results for input(s): BNP in the last 72 hours. Lipids: No results for input(s): CHOL, TRIG, HDL, LDLCALC in the last 72 hours. Invalid input(s): LDL  ABGs: No results found for: PH, PCO2, PO2, HCO3, O2SAT  Thyroid:   Lab Results   Component Value Date    TSH 0.41 08/21/2018      Urinalysis: No results for input(s): BACTERIA, BLOODU, CLARITYU, COLORU, PHUR, PROTEINU, RBCUA, SPECGRAV, BILIRUBINUR, NITRU, WBCUA, LEUKOCYTESUR, GLUCOSEU in the last 72 hours. Assessment:    Principal Problem:    Encephalopathy acute  Active Problems:    Hyperglycemia    Hypokalemia    KATELYNN (acute kidney injury) ([address]    Dehydration    NSVT (nonsustained ventricular tachycardia) ([facility])    Depression    Aspiration pneumonia (HCC)    Non-traumatic rhabdomyolysis    Acute metabolic encephalopathy    Unresponsive    Arterial hypotension    Hypoxic ischemic encephalopathy    Hypernatremia    Vitamin D deficiency  Resolved Problems:    Hypovolemic shock ([address]      Plan:  Meds reviewed- changes made as appropriate. On Unasyn. Can Change to Augmentin. On Lovenox. Continue medications for restless leg syndrome  Increase activity as permitted and tolerated. Questions patient had were answered to her satisfaction. Continue supplemental oxygen  Patient was informed of the need for using oxygen. Patient was educated on the importance of compliance and the benefits of oxygen use. Complications of oxygen use, including dryness of the nostrils, epistaxis and also the fire hazard were explained to the patient.   Patient willingly accepts to use  the oxygen as recommended  Cxr reviewed. Diet reviewed  Thank you for having us involved in the care of your patient. Please call us if you have any questions or concerns.     Randy Yo MD      8/25/2018, 3:19 PM    Pulmonary & Critical Care

## 2018-08-26 LAB
ABSOLUTE EOS #: 0.44 K/UL (ref 0–0.44)
ABSOLUTE IMMATURE GRANULOCYTE: 0.04 K/UL (ref 0–0.3)
ABSOLUTE LYMPH #: 1.47 K/UL (ref 1.1–3.7)
ABSOLUTE MONO #: 0.51 K/UL (ref 0.1–1.2)
ANION GAP SERPL CALCULATED.3IONS-SCNC: 16 MMOL/L (ref 9–17)
BASOPHILS # BLD: 0 % (ref 0–2)
BASOPHILS ABSOLUTE: 0.03 K/UL (ref 0–0.2)
BUN BLDV-MCNC: 10 MG/DL (ref 8–23)
BUN/CREAT BLD: ABNORMAL (ref 9–20)
CALCIUM IONIZED: 1.07 MMOL/L (ref 1.13–1.33)
CALCIUM SERPL-MCNC: 8.6 MG/DL (ref 8.6–10.4)
CHLORIDE BLD-SCNC: 103 MMOL/L (ref 98–107)
CO2: 28 MMOL/L (ref 20–31)
CREAT SERPL-MCNC: 0.44 MG/DL (ref 0.5–0.9)
DIFFERENTIAL TYPE: ABNORMAL
EOSINOPHILS RELATIVE PERCENT: 5 % (ref 1–4)
GFR AFRICAN AMERICAN: >60 ML/MIN
GFR NON-AFRICAN AMERICAN: >60 ML/MIN
GFR SERPL CREATININE-BSD FRML MDRD: ABNORMAL ML/MIN/{1.73_M2}
GFR SERPL CREATININE-BSD FRML MDRD: ABNORMAL ML/MIN/{1.73_M2}
GLUCOSE BLD-MCNC: 77 MG/DL (ref 65–105)
GLUCOSE BLD-MCNC: 86 MG/DL (ref 70–99)
HCT VFR BLD CALC: 41.5 % (ref 36.3–47.1)
HEMOGLOBIN: 13.5 G/DL (ref 11.9–15.1)
IMMATURE GRANULOCYTES: 0 %
LYMPHOCYTES # BLD: 16 % (ref 24–43)
MAGNESIUM: 1.7 MG/DL (ref 1.6–2.6)
MCH RBC QN AUTO: 31.1 PG (ref 25.2–33.5)
MCHC RBC AUTO-ENTMCNC: 32.5 G/DL (ref 28.4–34.8)
MCV RBC AUTO: 95.6 FL (ref 82.6–102.9)
MONOCYTES # BLD: 6 % (ref 3–12)
NRBC AUTOMATED: 0 PER 100 WBC
PDW BLD-RTO: 13.1 % (ref 11.8–14.4)
PHOSPHORUS: 2.3 MG/DL (ref 2.6–4.5)
PLATELET # BLD: 157 K/UL (ref 138–453)
PLATELET ESTIMATE: ABNORMAL
PMV BLD AUTO: 10.4 FL (ref 8.1–13.5)
POTASSIUM SERPL-SCNC: 3.6 MMOL/L (ref 3.7–5.3)
RBC # BLD: 4.34 M/UL (ref 3.95–5.11)
RBC # BLD: ABNORMAL 10*6/UL
SEG NEUTROPHILS: 73 % (ref 36–65)
SEGMENTED NEUTROPHILS ABSOLUTE COUNT: 6.49 K/UL (ref 1.5–8.1)
SODIUM BLD-SCNC: 147 MMOL/L (ref 135–144)
WBC # BLD: 9 K/UL (ref 3.5–11.3)
WBC # BLD: ABNORMAL 10*3/UL

## 2018-08-26 PROCEDURE — 2580000003 HC RX 258: Performed by: STUDENT IN AN ORGANIZED HEALTH CARE EDUCATION/TRAINING PROGRAM

## 2018-08-26 PROCEDURE — 2060000000 HC ICU INTERMEDIATE R&B

## 2018-08-26 PROCEDURE — 2580000003 HC RX 258: Performed by: HOSPITALIST

## 2018-08-26 PROCEDURE — 83735 ASSAY OF MAGNESIUM: CPT

## 2018-08-26 PROCEDURE — 36415 COLL VENOUS BLD VENIPUNCTURE: CPT

## 2018-08-26 PROCEDURE — 80048 BASIC METABOLIC PNL TOTAL CA: CPT

## 2018-08-26 PROCEDURE — 6370000000 HC RX 637 (ALT 250 FOR IP): Performed by: HOSPITALIST

## 2018-08-26 PROCEDURE — 94762 N-INVAS EAR/PLS OXIMTRY CONT: CPT

## 2018-08-26 PROCEDURE — 6360000002 HC RX W HCPCS: Performed by: HOSPITALIST

## 2018-08-26 PROCEDURE — 6360000002 HC RX W HCPCS: Performed by: INTERNAL MEDICINE

## 2018-08-26 PROCEDURE — 6370000000 HC RX 637 (ALT 250 FOR IP): Performed by: STUDENT IN AN ORGANIZED HEALTH CARE EDUCATION/TRAINING PROGRAM

## 2018-08-26 PROCEDURE — 85025 COMPLETE CBC W/AUTO DIFF WBC: CPT

## 2018-08-26 PROCEDURE — 99233 SBSQ HOSP IP/OBS HIGH 50: CPT | Performed by: INTERNAL MEDICINE

## 2018-08-26 PROCEDURE — 6370000000 HC RX 637 (ALT 250 FOR IP): Performed by: EMERGENCY MEDICINE

## 2018-08-26 PROCEDURE — 82330 ASSAY OF CALCIUM: CPT

## 2018-08-26 PROCEDURE — 90792 PSYCH DIAG EVAL W/MED SRVCS: CPT | Performed by: NURSE PRACTITIONER

## 2018-08-26 PROCEDURE — 84100 ASSAY OF PHOSPHORUS: CPT

## 2018-08-26 PROCEDURE — 82947 ASSAY GLUCOSE BLOOD QUANT: CPT

## 2018-08-26 PROCEDURE — 6360000002 HC RX W HCPCS: Performed by: STUDENT IN AN ORGANIZED HEALTH CARE EDUCATION/TRAINING PROGRAM

## 2018-08-26 PROCEDURE — 99232 SBSQ HOSP IP/OBS MODERATE 35: CPT | Performed by: INTERNAL MEDICINE

## 2018-08-26 RX ORDER — HALOPERIDOL 5 MG/ML
5 INJECTION INTRAMUSCULAR EVERY 6 HOURS PRN
Status: DISCONTINUED | OUTPATIENT
Start: 2018-08-26 | End: 2018-08-31 | Stop reason: HOSPADM

## 2018-08-26 RX ORDER — CALCIUM CARBONATE/VITAMIN D3 250-3.125
1 TABLET ORAL DAILY
Status: DISCONTINUED | OUTPATIENT
Start: 2018-08-26 | End: 2018-08-31 | Stop reason: HOSPADM

## 2018-08-26 RX ORDER — AMOXICILLIN AND CLAVULANATE POTASSIUM 875; 125 MG/1; MG/1
1 TABLET, FILM COATED ORAL EVERY 12 HOURS SCHEDULED
Status: DISCONTINUED | OUTPATIENT
Start: 2018-08-26 | End: 2018-08-28

## 2018-08-26 RX ADMIN — ENOXAPARIN SODIUM 40 MG: 40 INJECTION SUBCUTANEOUS at 08:52

## 2018-08-26 RX ADMIN — HALOPERIDOL LACTATE 5 MG: 5 INJECTION, SOLUTION INTRAMUSCULAR at 22:55

## 2018-08-26 RX ADMIN — PANTOPRAZOLE SODIUM 20 MG: 20 TABLET, DELAYED RELEASE ORAL at 05:38

## 2018-08-26 RX ADMIN — ROPINIROLE HYDROCHLORIDE 1 MG: 1 TABLET, FILM COATED ORAL at 08:52

## 2018-08-26 RX ADMIN — AMOXICILLIN AND CLAVULANATE POTASSIUM 1 TABLET: 875; 125 TABLET, FILM COATED ORAL at 19:48

## 2018-08-26 RX ADMIN — PREGABALIN 150 MG: 75 CAPSULE ORAL at 19:49

## 2018-08-26 RX ADMIN — OXYCODONE HYDROCHLORIDE 5 MG: 5 TABLET ORAL at 14:35

## 2018-08-26 RX ADMIN — THERA TABS 1 TABLET: TAB at 08:52

## 2018-08-26 RX ADMIN — THIAMINE HYDROCHLORIDE 250 MG: 100 INJECTION, SOLUTION INTRAMUSCULAR; INTRAVENOUS at 05:36

## 2018-08-26 RX ADMIN — SODIUM CHLORIDE: 4.5 INJECTION, SOLUTION INTRAVENOUS at 16:48

## 2018-08-26 RX ADMIN — Medication 2 MG: at 19:50

## 2018-08-26 RX ADMIN — FOLIC ACID 1 MG: 1 TABLET ORAL at 08:52

## 2018-08-26 RX ADMIN — AMOXICILLIN AND CLAVULANATE POTASSIUM 1 TABLET: 875; 125 TABLET, FILM COATED ORAL at 10:05

## 2018-08-26 RX ADMIN — MIRTAZAPINE 30 MG: 30 TABLET, FILM COATED ORAL at 19:49

## 2018-08-26 RX ADMIN — ROPINIROLE HYDROCHLORIDE 1 MG: 1 TABLET, FILM COATED ORAL at 12:44

## 2018-08-26 RX ADMIN — ROPINIROLE HYDROCHLORIDE 1 MG: 1 TABLET, FILM COATED ORAL at 19:49

## 2018-08-26 RX ADMIN — ROPINIROLE HYDROCHLORIDE 1 MG: 1 TABLET, FILM COATED ORAL at 17:02

## 2018-08-26 RX ADMIN — FENTANYL CITRATE 50 MCG: 50 INJECTION INTRAMUSCULAR; INTRAVENOUS at 23:42

## 2018-08-26 RX ADMIN — CALCIUM CARBONATE-CHOLECALCIFEROL TAB 250 MG-125 UNIT 250 MG: 250-125 TAB at 10:06

## 2018-08-26 RX ADMIN — TRAZODONE HYDROCHLORIDE 50 MG: 50 TABLET ORAL at 19:49

## 2018-08-26 RX ADMIN — SODIUM CHLORIDE 3 G: 900 INJECTION INTRAVENOUS at 03:00

## 2018-08-26 RX ADMIN — CLONAZEPAM 1 MG: 1 TABLET ORAL at 08:52

## 2018-08-26 RX ADMIN — SODIUM CHLORIDE: 4.5 INJECTION, SOLUTION INTRAVENOUS at 23:46

## 2018-08-26 RX ADMIN — ASPIRIN 81 MG: 81 TABLET, CHEWABLE ORAL at 08:52

## 2018-08-26 RX ADMIN — OXYCODONE HYDROCHLORIDE 10 MG: 5 TABLET ORAL at 02:18

## 2018-08-26 RX ADMIN — CLONAZEPAM 1 MG: 1 TABLET ORAL at 19:50

## 2018-08-26 RX ADMIN — PREGABALIN 150 MG: 75 CAPSULE ORAL at 08:52

## 2018-08-26 ASSESSMENT — PAIN SCALES - GENERAL
PAINLEVEL_OUTOF10: 7
PAINLEVEL_OUTOF10: 7
PAINLEVEL_OUTOF10: 0
PAINLEVEL_OUTOF10: 0
PAINLEVEL_OUTOF10: 4
PAINLEVEL_OUTOF10: 0

## 2018-08-26 NOTE — PLAN OF CARE
Problem: OXYGENATION/RESPIRATORY FUNCTION  Goal: Patient will achieve/maintain normal respiratory rate/effort  Respiratory rate and effort will be within normal limits for the patient   Outcome: Ongoing

## 2018-08-26 NOTE — PLAN OF CARE
Problem: Falls - Risk of:  Goal: Will remain free from falls  Will remain free from falls   Outcome: Ongoing  Pt remains free of falls at this time. Bed locked in lowest position, siderails x2, call light in reach. Non-skid footwear applied. Encouraged pt to call for assistance as needed for safety. Falling star posted outside of room. Will continue to monitor. Telesitter remains in place for safety.

## 2018-08-26 NOTE — PROGRESS NOTES
Guard initiated for pt. Safety, pt. Non redirectable, will continue to monitor.   Electronically signed by Evita Mallory RN on 8/26/2018 at 6:55 PM
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
## 2018-08-26 NOTE — PROGRESS NOTES
NO INDICATION FOR PPM/EPS  2. CONTINUE ASPIRIN  3. Cardiology to sign off, please call with questions        Please see orders. Discussed with patient and family and nursing.     Akil Coronel MD

## 2018-08-26 NOTE — PROGRESS NOTES
PULMONARY PROGRESS NOTE      Patient: Dipesh Griffith  YOB: 1955    MRN: [id number]     Acct: [de-identified]     Admit date: 8/21/2018    REASON FOR CONSULT:- Right lower lobe pneumonia with acute hypoxemic respiratory failure and restless leg syndrome    Pt seen and Chart reviewed. Patient is awake and more conversant  Denied much of shortness of breath or wheezing. Has cough with mucoid sputum. No hemoptysis. No chest pain or pressure.   Restless legs symptoms are way better    Subjective:   Review of Systems -   General ROS: Completed and except as mentioned above were negative   Psychological ROS:  Completed and except as mentioned above were negative  Allergy and Immunology ROS:  Completed and except as mentioned above were negative  Hematological and Lymphatic ROS:  Completed and except as mentioned above were negative  Respiratory ROS:  Completed and except as mentioned above were negative  Cardiovascular ROS:  Completed and except as mentioned above were negative  Gastrointestinal ROS: Completed and except as mentioned above were negative  Genito-Urinary ROS:  Completed and except as mentioned above were negative  Musculoskeletal ROS:  Completed and except as mentioned above were negative  Neurological ROS:  Completed and except as mentioned above were negative  Dermatological ROS:  Completed and except as mentioned above were negative      Diet:  DIET GENERAL;  Dietary Nutrition Supplements: Standard High Calorie Oral Supplement      Medications:Current Inpatient    Scheduled Meds:   oyster shell calcium/vitamin D  1 tablet Oral Daily    amoxicillin-clavulanate  1 tablet Oral 2 times per day    thiamine (VITAMIN B1) IVPB  250 mg Intravenous Q24H    rOPINIRole  1 mg Oral 4x Daily    folic acid  1 mg Oral Daily    multivitamin  1 tablet Oral Daily    enoxaparin  40 mg Subcutaneous Daily    pantoprazole  20 mg results for input(s): LABA1C in the last 72 hours. INR: No results for input(s): INR in the last 72 hours. Hepatic: No results for input(s): ALKPHOS, ALT, AST, PROT, BILITOT, BILIDIR, LABALBU in the last 72 hours. Amylase and Lipase:No results for input(s): LACTA, AMYLASE in the last 72 hours. Lactic Acid: No results for input(s): LACTA in the last 72 hours. CARDIAC ENZYMES:No results for input(s): CKTOTAL, CKMB, CKMBINDEX, TROPONINI in the last 72 hours. BNP: No results for input(s): BNP in the last 72 hours. Lipids: No results for input(s): CHOL, TRIG, HDL, LDLCALC in the last 72 hours. Invalid input(s): LDL  ABGs: No results found for: PH, PCO2, PO2, HCO3, O2SAT  Thyroid:   Lab Results   Component Value Date    TSH 0.41 08/21/2018      Urinalysis: No results for input(s): BACTERIA, BLOODU, CLARITYU, COLORU, PHUR, PROTEINU, RBCUA, SPECGRAV, BILIRUBINUR, NITRU, WBCUA, LEUKOCYTESUR, GLUCOSEU in the last 72 hours. Assessment:    Principal Problem:    Encephalopathy acute  Active Problems:    Hyperglycemia    Hypokalemia    KATELYNN (acute kidney injury) ([address]    Dehydration    NSVT (nonsustained ventricular tachycardia) ([facility])    Depression    Aspiration pneumonia (HCC)    Non-traumatic rhabdomyolysis    Acute metabolic encephalopathy    Unresponsive    Arterial hypotension    Hypoxic ischemic encephalopathy    Hypernatremia    Vitamin D deficiency  Resolved Problems:    Hypovolemic shock ([address]      Plan:  Meds reviewed- changes made as appropriate. On Augmentin. On Lovenox. Continue medications for restless leg syndrome  Increase activity as permitted and tolerated. Questions patient had were answered to her satisfaction. Continue supplemental oxygen. Patient was informed of the need for using oxygen. Patient was educated on the importance of compliance and the benefits of oxygen use.   Complications of oxygen use, including dryness of the nostrils, epistaxis and also the fire hazard were

## 2018-08-26 NOTE — PROGRESS NOTES
[facility]  Internal Medicine Residency Program  Inpatient Daily Progress Note  ______________________________________________________________________________    Patient: Noe Deleon  YOB: 1955   MRN: [id number]    Acct: [de-identified]     Admit date: 8/21/2018  Today's date: 08/26/18  Number of days in the hospital: 5  Expected Discharge Date: 08/28/18    Admitting Diagnosis: Encephalopathy acute    Subjective:   Pt seen and Chart reviewed. No acute overnight events. Patient remains hemodynamically stable. Patient is up in the chair this morning. She is complaining of restless leg syndrome and is requesting her medications. Patient is on clonazepam, Lyrica, and droperidol. Denies any chest pain, shortness of breath, abdominal pain.     Objective:   Vital Sign:  BP (!) 154/74   Pulse 86   Temp 98.2 °F (36.8 °C) (Oral)   Resp 12   Ht 5' 7\" (1.702 m)   Wt 207 lb 3.7 oz (94 kg)   SpO2 94%   BMI 32.46 kg/m²       Physical Exam:  General appearance:   alert, well appearing, and in no distress  Mental Status: alert, oriented to person, place, and time  Lungs:  clear to auscultation, no wheezes, rales or rhonchi, symmetric air entry  Heart[de-identified] normal rate and regular rhythm, S1 and S2 normal  Abdomen:  soft, nontender, nondistended, no masses or organomegaly  Extremities: no pedal edema noted   Skin: normal coloration and turgor, no rashes, no suspicious skin lesions noted    Medications:  Scheduled Medications   oyster shell calcium/vitamin D  1 tablet Oral Daily    thiamine (VITAMIN B1) IVPB  250 mg Intravenous Q24H    rOPINIRole  1 mg Oral 4x Daily    folic acid  1 mg Oral Daily    multivitamin  1 tablet Oral Daily    enoxaparin  40 mg Subcutaneous Daily    pantoprazole  20 mg Oral QAM AC    aspirin  81 mg Oral Daily    traZODone  50 mg Oral Nightly    mirtazapine  30 mg Oral Nightly    clonazePAM  1 mg Oral BID    vitamin D  50,000 Units Oral Weekly    ampicillin-sulbactam  3 g Intravenous Q6H    pregabalin  150 mg Oral BID    sodium chloride flush  10 mL Intravenous 2 times per day       PRN Medications  sodium chloride 1 spray PRN   sodium phosphate IVPB 0.16 mmol/kg PRN   Or     sodium phosphate IVPB 0.32 mmol/kg PRN   potassium chloride 40 mEq PRN   Or     potassium chloride 40 mEq PRN   Or     potassium chloride 10 mEq PRN   magnesium sulfate 1 g PRN   fentanNYL 50 mcg Q1H PRN   Or     fentanNYL 25 mcg Q2H PRN   oxyCODONE 5 mg Q4H PRN   Or     oxyCODONE 10 mg Q4H PRN   melatonin 2 mg Nightly PRN   sodium chloride flush 10 mL PRN   magnesium hydroxide 30 mL Daily PRN   ondansetron 4 mg Q6H PRN   glucose 15 g PRN   dextrose 12.5 g PRN   glucagon (rDNA) 1 mg PRN   dextrose 100 mL/hr PRN       Diagnostic Labs and Imaging:  CBC:  Recent Labs      08/24/18   0533  08/25/18   0616  08/26/18   0650   WBC  12.8*  9.8  9.0   HGB  12.4  13.8  13.5   PLT  See Reflexed IPF Result  160  157     BMP: Recent Labs      08/24/18   0533  08/24/18   2316  08/25/18   0616  08/26/18   0650   NA  146*   --   144  147*   K  3.3*  3.4*  3.7  3.6*   CL  106   --   102  103   CO2  27   --   29  28   BUN  10   --   9  10   CREATININE  0.73   --   0.62  0.44*   GLUCOSE  84   --   78  86       Assessment and Plan:   1. Acute encephalopathy secondary to sepsis. Resolved  2. Aspirations pneumonia. Continue Augmentin. 3. Acute respirtory failure with hypoxemia and hypercapnia. Wean off oxygen  4. NSVT, runs of A. fib and pauses. No further pauses, seen by EP. No indications for PPM/EPS. 5. Restless leg syndrome. Continue ropirinol, clonazepam and Lyrica  6. Vitamin D deficiency. Vitamin D supplements  7. DVT prophylaxis: Lovenox  8. PT/OT eval and treat  9. Patient is to follow-up with neurology in 6-8 weeks. 10. Discharge planning.   Patient has given list of SNF choices      Janet Castillo MD    Department of Internal Medicine  [facility]         8/26/2018, 8:27 AM      Attending Physician Statement  I have discussed the case, including pertinent history and exam findings with the resident and the team.  I have seen and examined the patient and the key elements of the encounter have been performed by me. I agree with the assessment, plan and orders as documented by the resident.         Sara Crowell MD, [facility], [address]  Attending Physician, Internal Medicine Service    Internal Medicine Residency Program  8/26/2018, 7:05 PM

## 2018-08-27 LAB
ANION GAP SERPL CALCULATED.3IONS-SCNC: 13 MMOL/L (ref 9–17)
BUN BLDV-MCNC: 7 MG/DL (ref 8–23)
BUN/CREAT BLD: ABNORMAL (ref 9–20)
CALCIUM SERPL-MCNC: 8.4 MG/DL (ref 8.6–10.4)
CHLORIDE BLD-SCNC: 98 MMOL/L (ref 98–107)
CO2: 29 MMOL/L (ref 20–31)
CREAT SERPL-MCNC: 0.4 MG/DL (ref 0.5–0.9)
CULTURE: NORMAL
CULTURE: NORMAL
GFR AFRICAN AMERICAN: >60 ML/MIN
GFR NON-AFRICAN AMERICAN: >60 ML/MIN
GFR SERPL CREATININE-BSD FRML MDRD: ABNORMAL ML/MIN/{1.73_M2}
GFR SERPL CREATININE-BSD FRML MDRD: ABNORMAL ML/MIN/{1.73_M2}
GLUCOSE BLD-MCNC: 81 MG/DL (ref 70–99)
GLUCOSE BLD-MCNC: 83 MG/DL (ref 65–105)
Lab: NORMAL
Lab: NORMAL
MAGNESIUM: 1.6 MG/DL (ref 1.6–2.6)
POTASSIUM SERPL-SCNC: 3 MMOL/L (ref 3.7–5.3)
SODIUM BLD-SCNC: 140 MMOL/L (ref 135–144)
SPECIMEN DESCRIPTION: NORMAL
SPECIMEN DESCRIPTION: NORMAL
STATUS: NORMAL
STATUS: NORMAL

## 2018-08-27 PROCEDURE — 97530 THERAPEUTIC ACTIVITIES: CPT

## 2018-08-27 PROCEDURE — 6370000000 HC RX 637 (ALT 250 FOR IP): Performed by: STUDENT IN AN ORGANIZED HEALTH CARE EDUCATION/TRAINING PROGRAM

## 2018-08-27 PROCEDURE — 6370000000 HC RX 637 (ALT 250 FOR IP): Performed by: EMERGENCY MEDICINE

## 2018-08-27 PROCEDURE — 80048 BASIC METABOLIC PNL TOTAL CA: CPT

## 2018-08-27 PROCEDURE — 99233 SBSQ HOSP IP/OBS HIGH 50: CPT | Performed by: INTERNAL MEDICINE

## 2018-08-27 PROCEDURE — 2060000000 HC ICU INTERMEDIATE R&B

## 2018-08-27 PROCEDURE — 6370000000 HC RX 637 (ALT 250 FOR IP): Performed by: HOSPITALIST

## 2018-08-27 PROCEDURE — 83735 ASSAY OF MAGNESIUM: CPT

## 2018-08-27 PROCEDURE — 36415 COLL VENOUS BLD VENIPUNCTURE: CPT

## 2018-08-27 PROCEDURE — 99232 SBSQ HOSP IP/OBS MODERATE 35: CPT | Performed by: INTERNAL MEDICINE

## 2018-08-27 PROCEDURE — 2580000003 HC RX 258: Performed by: STUDENT IN AN ORGANIZED HEALTH CARE EDUCATION/TRAINING PROGRAM

## 2018-08-27 PROCEDURE — 94762 N-INVAS EAR/PLS OXIMTRY CONT: CPT

## 2018-08-27 PROCEDURE — 6360000002 HC RX W HCPCS: Performed by: STUDENT IN AN ORGANIZED HEALTH CARE EDUCATION/TRAINING PROGRAM

## 2018-08-27 PROCEDURE — 82947 ASSAY GLUCOSE BLOOD QUANT: CPT

## 2018-08-27 PROCEDURE — 6360000002 HC RX W HCPCS: Performed by: INTERNAL MEDICINE

## 2018-08-27 PROCEDURE — 2580000003 HC RX 258: Performed by: HOSPITALIST

## 2018-08-27 PROCEDURE — 97116 GAIT TRAINING THERAPY: CPT

## 2018-08-27 PROCEDURE — 97110 THERAPEUTIC EXERCISES: CPT

## 2018-08-27 RX ORDER — THIAMINE MONONITRATE (VIT B1) 100 MG
100 TABLET ORAL DAILY
Status: DISCONTINUED | OUTPATIENT
Start: 2018-08-27 | End: 2018-08-31 | Stop reason: HOSPADM

## 2018-08-27 RX ADMIN — ROPINIROLE HYDROCHLORIDE 1 MG: 1 TABLET, FILM COATED ORAL at 16:19

## 2018-08-27 RX ADMIN — Medication 2 MG: at 21:30

## 2018-08-27 RX ADMIN — OXYCODONE HYDROCHLORIDE 10 MG: 5 TABLET ORAL at 02:18

## 2018-08-27 RX ADMIN — ROPINIROLE HYDROCHLORIDE 1 MG: 1 TABLET, FILM COATED ORAL at 12:58

## 2018-08-27 RX ADMIN — FOLIC ACID 1 MG: 1 TABLET ORAL at 08:28

## 2018-08-27 RX ADMIN — SODIUM CHLORIDE: 4.5 INJECTION, SOLUTION INTRAVENOUS at 14:07

## 2018-08-27 RX ADMIN — TRAZODONE HYDROCHLORIDE 50 MG: 50 TABLET ORAL at 21:30

## 2018-08-27 RX ADMIN — Medication 100 MG: at 16:19

## 2018-08-27 RX ADMIN — Medication 10 ML: at 21:32

## 2018-08-27 RX ADMIN — PREGABALIN 150 MG: 75 CAPSULE ORAL at 08:29

## 2018-08-27 RX ADMIN — ENOXAPARIN SODIUM 40 MG: 40 INJECTION SUBCUTANEOUS at 08:27

## 2018-08-27 RX ADMIN — ROPINIROLE HYDROCHLORIDE 1 MG: 1 TABLET, FILM COATED ORAL at 22:13

## 2018-08-27 RX ADMIN — ROPINIROLE HYDROCHLORIDE 1 MG: 1 TABLET, FILM COATED ORAL at 08:29

## 2018-08-27 RX ADMIN — AMOXICILLIN AND CLAVULANATE POTASSIUM 1 TABLET: 875; 125 TABLET, FILM COATED ORAL at 21:31

## 2018-08-27 RX ADMIN — THERA TABS 1 TABLET: TAB at 08:28

## 2018-08-27 RX ADMIN — THIAMINE HYDROCHLORIDE 250 MG: 100 INJECTION, SOLUTION INTRAMUSCULAR; INTRAVENOUS at 05:53

## 2018-08-27 RX ADMIN — Medication 10 ML: at 08:29

## 2018-08-27 RX ADMIN — CALCIUM CARBONATE-CHOLECALCIFEROL TAB 250 MG-125 UNIT 250 MG: 250-125 TAB at 08:28

## 2018-08-27 RX ADMIN — MIRTAZAPINE 30 MG: 30 TABLET, FILM COATED ORAL at 21:31

## 2018-08-27 RX ADMIN — PANTOPRAZOLE SODIUM 20 MG: 20 TABLET, DELAYED RELEASE ORAL at 05:53

## 2018-08-27 RX ADMIN — HALOPERIDOL LACTATE 5 MG: 5 INJECTION, SOLUTION INTRAMUSCULAR at 22:54

## 2018-08-27 RX ADMIN — AMOXICILLIN AND CLAVULANATE POTASSIUM 1 TABLET: 875; 125 TABLET, FILM COATED ORAL at 08:29

## 2018-08-27 RX ADMIN — ASPIRIN 81 MG: 81 TABLET, CHEWABLE ORAL at 08:29

## 2018-08-27 RX ADMIN — CLONAZEPAM 1 MG: 1 TABLET ORAL at 21:30

## 2018-08-27 RX ADMIN — PREGABALIN 150 MG: 75 CAPSULE ORAL at 21:31

## 2018-08-27 RX ADMIN — CLONAZEPAM 1 MG: 1 TABLET ORAL at 08:27

## 2018-08-27 ASSESSMENT — PAIN SCALES - GENERAL
PAINLEVEL_OUTOF10: 10
PAINLEVEL_OUTOF10: 7
PAINLEVEL_OUTOF10: 6

## 2018-08-27 NOTE — PROGRESS NOTES
Patient attempting to get out of bed. Hitting staff during care. Order received for IM haldol 5 mg which was given. Security called to bedside as patient is unable to maintain her safety. Order obtained for soft wrist restraints which were applied.

## 2018-08-27 NOTE — PROGRESS NOTES
term goal 5: progress OOB mobility as tolerated    Plan    Plan  Times per week: 5-6x/wk  Times per day: Daily  Current Treatment Recommendations: Strengthening, ROM, Functional Mobility Training, Transfer Training, Endurance Training, Patient/Caregiver Education & Training, Positioning, Gait Training  Safety Devices  Type of devices: Call light within reach, Nurse notified, Maria Connor in use, All fall risk precautions in place, Gait belt, Patient at risk for falls, Left in chair, Sitter present  Restraints  Initially in place: No     Therapy Time   Individual Concurrent Group Co-treatment   Time In 0930         Time Out 1009         Minutes [address] [place] [address]

## 2018-08-27 NOTE — PROGRESS NOTES
94     Patient Vitals for the past 96 hrs (Last 3 readings):   Weight   08/26/18 0600 207 lb 3.7 oz (94 kg)   08/25/18 0600 206 lb 2.1 oz (93.5 kg)   08/24/18 0717 202 lb 2.6 oz (91.7 kg)          PHYSICAL EXAMINATION:  Head and neck atraumatic, normocephalic    Lymph nodes-no cervical, supraclavicular lymphadenopathy    Neck-no JVP elevation    Lungs - Ventilating all lobes without  dullness. No bronchial breath sounds. Chest expansion equal bilaterally , right post and axillar rales and b/l  Wheeze exp     CVS- S1, S2 regular. No S3 no S4, no murmurs    Abdomen-nontender, nondistended. Bowel sounds are present. No organomegaly    Lower extremity-no edema    Upper extremity-no edema    Neurological-grossly normal cranial nerves.   No overt motor deficit        Medications:    Scheduled Meds:   thiamine  100 mg Oral Daily    oyster shell calcium/vitamin D  1 tablet Oral Daily    amoxicillin-clavulanate  1 tablet Oral 2 times per day    rOPINIRole  1 mg Oral 4x Daily    folic acid  1 mg Oral Daily    multivitamin  1 tablet Oral Daily    enoxaparin  40 mg Subcutaneous Daily    pantoprazole  20 mg Oral QAM AC    aspirin  81 mg Oral Daily    traZODone  50 mg Oral Nightly    mirtazapine  30 mg Oral Nightly    clonazePAM  1 mg Oral BID    vitamin D  50,000 Units Oral Weekly    pregabalin  150 mg Oral BID    sodium chloride flush  10 mL Intravenous 2 times per day       Continuous Infusions:   sodium chloride 50 mL/hr at 08/27/18 1407    dextrose         PRN Meds:  haloperidol lactate, sodium chloride, sodium phosphate IVPB **OR** sodium phosphate IVPB, potassium chloride **OR** potassium chloride **OR** potassium chloride, magnesium sulfate, fentanNYL **OR** fentanNYL, oxyCODONE **OR** oxyCODONE, melatonin, sodium chloride flush, magnesium hydroxide, ondansetron, glucose, dextrose, glucagon (rDNA), dextrose    Labs:  CBC: Recent Labs      08/25/18   0616  08/26/18   0650   WBC  9.8  9.0   HGB  13.8

## 2018-08-27 NOTE — PROGRESS NOTES
Security was called to bedside for unsteady patient attempting to get up, unable to stand safely even with staff assistance, refusing to sit in bed or follow directions. Guard and RN unable to redirect patient to stay in bed, security applied soft wrist restraints after getting patient positioned in bed. See MAR re: Haldol IM per order at 2255. Criteria for restraints explained to patient who does not consistently display understanding, insists she can go for a walk and needs to get ready for work. Patient was positioned for comfort and offered other comfort measures. See restraint flowsheets.

## 2018-08-27 NOTE — PROGRESS NOTES
Nutrition Assessment    Type and Reason for Visit: Reassess    Nutrition Recommendations:   - Continue current diet with Ensure ONS.  - Encourage/monitor intakes as tolerated. Malnutrition Assessment:  · Malnutrition Status: At risk for malnutrition  · Context: Chronic illness    Nutrition Diagnosis:   · Problem: Inadequate oral intake  · Etiology: related to current medical condition     Signs and symptoms:  as evidenced by Intake 0-25%    Nutrition Assessment:  · Subjective Assessment: Pt with restraints in place d/t agitation with sitter at bedside. RN reports pt is taking fair amounts of meals and tolerating an oral diet. · Nutrition-Focused Physical Findings: Active bowel sounds. +1 generalized, BUE, and BLE edema. · Wound Type: scattered abrasions, left thigh and buttocks deep tissue injury, skin tears   · Current Nutrition Therapies:  · Oral Diet Orders: General   · Oral Diet intake: 1-25%  · Oral Nutrition Supplement (ONS) Orders: Standard High Calorie Oral Supplement  · ONS intake: 1-25%  · Anthropometric Measures:  · Ht: 5' 7\" (170.2 cm)   · Current Body Wt: 207 lb 3.7 oz (94 kg)  · Admission Body Wt: 205 lb 14.6 oz (93.4 kg)  · Ideal Body Wt: 135 lb (61.2 kg), % Ideal Body 150%  · BMI Classification: BMI 30.0 - 34.9 Obese Class I  · Comparative Standards (Estimated Nutrition Needs):  · Estimated Daily Total Kcal: 6119-5120 kcal/day  · Estimated Daily Protein (g):  g pro/day     Estimated Intake vs Estimated Needs: Intake Less Than Needs, Intake Improving    Nutrition Risk Level:  Moderate    Nutrition Interventions:   Continue current diet, Continue current ONS  Continued Inpatient Monitoring, Education Not Indicated    Nutrition Evaluation:   · Evaluation: Progressing toward goals   · Goals: meet % of estimated nutrition needs     · Monitoring: Meal Intake, Supplement Intake, Diet Tolerance, Skin Integrity, Wound Healing, Fluid Balance, Ascites/Edema, Weight, Comparative Standards,

## 2018-08-27 NOTE — PLAN OF CARE
Problem: Restraint Use - Nonviolent/Non-Self-Destructive Behavior:  Intervention: Manage bed safety  Safety measures in place.

## 2018-08-28 ENCOUNTER — APPOINTMENT (OUTPATIENT)
Dept: GENERAL RADIOLOGY | Age: 63
DRG: 133 | End: 2018-08-28
Payer: MEDICARE

## 2018-08-28 LAB
ABSOLUTE EOS #: 0.3 K/UL (ref 0–0.44)
ABSOLUTE IMMATURE GRANULOCYTE: 0.07 K/UL (ref 0–0.3)
ABSOLUTE LYMPH #: 1.51 K/UL (ref 1.1–3.7)
ABSOLUTE MONO #: 0.91 K/UL (ref 0.1–1.2)
ANION GAP SERPL CALCULATED.3IONS-SCNC: 16 MMOL/L (ref 9–17)
BASOPHILS # BLD: 0 % (ref 0–2)
BASOPHILS ABSOLUTE: 0.04 K/UL (ref 0–0.2)
BUN BLDV-MCNC: 6 MG/DL (ref 8–23)
BUN/CREAT BLD: ABNORMAL (ref 9–20)
CALCIUM SERPL-MCNC: 8.7 MG/DL (ref 8.6–10.4)
CHLORIDE BLD-SCNC: 99 MMOL/L (ref 98–107)
CO2: 26 MMOL/L (ref 20–31)
CREAT SERPL-MCNC: 0.42 MG/DL (ref 0.5–0.9)
CULTURE: NORMAL
DIFFERENTIAL TYPE: ABNORMAL
EOSINOPHILS RELATIVE PERCENT: 3 % (ref 1–4)
GFR AFRICAN AMERICAN: >60 ML/MIN
GFR NON-AFRICAN AMERICAN: >60 ML/MIN
GFR SERPL CREATININE-BSD FRML MDRD: ABNORMAL ML/MIN/{1.73_M2}
GFR SERPL CREATININE-BSD FRML MDRD: ABNORMAL ML/MIN/{1.73_M2}
GLUCOSE BLD-MCNC: 128 MG/DL (ref 70–99)
HCT VFR BLD CALC: 40.3 % (ref 36.3–47.1)
HEMOGLOBIN: 13.5 G/DL (ref 11.9–15.1)
IMMATURE GRANULOCYTES: 1 %
LYMPHOCYTES # BLD: 15 % (ref 24–43)
Lab: NORMAL
MAGNESIUM: 1.6 MG/DL (ref 1.6–2.6)
MCH RBC QN AUTO: 31 PG (ref 25.2–33.5)
MCHC RBC AUTO-ENTMCNC: 33.5 G/DL (ref 28.4–34.8)
MCV RBC AUTO: 92.4 FL (ref 82.6–102.9)
MONOCYTES # BLD: 9 % (ref 3–12)
NRBC AUTOMATED: 0 PER 100 WBC
PDW BLD-RTO: 12.9 % (ref 11.8–14.4)
PLATELET # BLD: 180 K/UL (ref 138–453)
PLATELET ESTIMATE: ABNORMAL
PMV BLD AUTO: 10.8 FL (ref 8.1–13.5)
POTASSIUM SERPL-SCNC: 3 MMOL/L (ref 3.7–5.3)
RBC # BLD: 4.36 M/UL (ref 3.95–5.11)
RBC # BLD: ABNORMAL 10*6/UL
SEG NEUTROPHILS: 71 % (ref 36–65)
SEGMENTED NEUTROPHILS ABSOLUTE COUNT: 7.03 K/UL (ref 1.5–8.1)
SODIUM BLD-SCNC: 141 MMOL/L (ref 135–144)
SPECIMEN DESCRIPTION: NORMAL
STATUS: NORMAL
WBC # BLD: 9.9 K/UL (ref 3.5–11.3)
WBC # BLD: ABNORMAL 10*3/UL

## 2018-08-28 PROCEDURE — 6360000002 HC RX W HCPCS: Performed by: STUDENT IN AN ORGANIZED HEALTH CARE EDUCATION/TRAINING PROGRAM

## 2018-08-28 PROCEDURE — 76937 US GUIDE VASCULAR ACCESS: CPT

## 2018-08-28 PROCEDURE — 6370000000 HC RX 637 (ALT 250 FOR IP): Performed by: STUDENT IN AN ORGANIZED HEALTH CARE EDUCATION/TRAINING PROGRAM

## 2018-08-28 PROCEDURE — 99233 SBSQ HOSP IP/OBS HIGH 50: CPT | Performed by: INTERNAL MEDICINE

## 2018-08-28 PROCEDURE — 99232 SBSQ HOSP IP/OBS MODERATE 35: CPT | Performed by: NURSE PRACTITIONER

## 2018-08-28 PROCEDURE — 2060000000 HC ICU INTERMEDIATE R&B

## 2018-08-28 PROCEDURE — 2500000003 HC RX 250 WO HCPCS: Performed by: STUDENT IN AN ORGANIZED HEALTH CARE EDUCATION/TRAINING PROGRAM

## 2018-08-28 PROCEDURE — 71045 X-RAY EXAM CHEST 1 VIEW: CPT

## 2018-08-28 PROCEDURE — 93005 ELECTROCARDIOGRAM TRACING: CPT

## 2018-08-28 PROCEDURE — 85025 COMPLETE CBC W/AUTO DIFF WBC: CPT

## 2018-08-28 PROCEDURE — 97116 GAIT TRAINING THERAPY: CPT

## 2018-08-28 PROCEDURE — 83735 ASSAY OF MAGNESIUM: CPT

## 2018-08-28 PROCEDURE — 6370000000 HC RX 637 (ALT 250 FOR IP): Performed by: HOSPITALIST

## 2018-08-28 PROCEDURE — 2580000003 HC RX 258: Performed by: HOSPITALIST

## 2018-08-28 PROCEDURE — 80048 BASIC METABOLIC PNL TOTAL CA: CPT

## 2018-08-28 PROCEDURE — 36415 COLL VENOUS BLD VENIPUNCTURE: CPT

## 2018-08-28 PROCEDURE — 6370000000 HC RX 637 (ALT 250 FOR IP): Performed by: EMERGENCY MEDICINE

## 2018-08-28 PROCEDURE — 6360000002 HC RX W HCPCS: Performed by: INTERNAL MEDICINE

## 2018-08-28 RX ORDER — CARBOXYMETHYLCELLULOSE SODIUM 10 MG/ML
1 GEL OPHTHALMIC EVERY 4 HOURS PRN
Status: DISCONTINUED | OUTPATIENT
Start: 2018-08-28 | End: 2018-08-31 | Stop reason: HOSPADM

## 2018-08-28 RX ORDER — POTASSIUM CHLORIDE 20 MEQ/1
20 TABLET, EXTENDED RELEASE ORAL ONCE
Status: COMPLETED | OUTPATIENT
Start: 2018-08-28 | End: 2018-08-28

## 2018-08-28 RX ORDER — QUETIAPINE FUMARATE 25 MG/1
25 TABLET, FILM COATED ORAL EVERY EVENING
Status: DISCONTINUED | OUTPATIENT
Start: 2018-08-28 | End: 2018-08-31 | Stop reason: HOSPADM

## 2018-08-28 RX ORDER — QUETIAPINE FUMARATE 25 MG/1
12.5 TABLET, FILM COATED ORAL
Status: DISCONTINUED | OUTPATIENT
Start: 2018-08-29 | End: 2018-08-31 | Stop reason: HOSPADM

## 2018-08-28 RX ORDER — LABETALOL HYDROCHLORIDE 5 MG/ML
10 INJECTION, SOLUTION INTRAVENOUS EVERY 4 HOURS PRN
Status: DISCONTINUED | OUTPATIENT
Start: 2018-08-28 | End: 2018-08-31 | Stop reason: HOSPADM

## 2018-08-28 RX ORDER — AMOXICILLIN AND CLAVULANATE POTASSIUM 875; 125 MG/1; MG/1
1 TABLET, FILM COATED ORAL EVERY 12 HOURS SCHEDULED
Status: DISCONTINUED | OUTPATIENT
Start: 2018-08-28 | End: 2018-08-31 | Stop reason: HOSPADM

## 2018-08-28 RX ORDER — POTASSIUM CHLORIDE 20 MEQ/1
20 TABLET, EXTENDED RELEASE ORAL ONCE
Status: DISCONTINUED | OUTPATIENT
Start: 2018-08-28 | End: 2018-08-31 | Stop reason: HOSPADM

## 2018-08-28 RX ADMIN — CLONAZEPAM 1 MG: 1 TABLET ORAL at 20:57

## 2018-08-28 RX ADMIN — HALOPERIDOL LACTATE 5 MG: 5 INJECTION, SOLUTION INTRAMUSCULAR at 04:53

## 2018-08-28 RX ADMIN — OXYCODONE HYDROCHLORIDE 10 MG: 5 TABLET ORAL at 22:33

## 2018-08-28 RX ADMIN — Medication 10 ML: at 20:57

## 2018-08-28 RX ADMIN — QUETIAPINE FUMARATE 25 MG: 25 TABLET ORAL at 17:10

## 2018-08-28 RX ADMIN — CLONAZEPAM 1 MG: 1 TABLET ORAL at 10:10

## 2018-08-28 RX ADMIN — PREGABALIN 150 MG: 75 CAPSULE ORAL at 10:11

## 2018-08-28 RX ADMIN — ROPINIROLE HYDROCHLORIDE 1 MG: 1 TABLET, FILM COATED ORAL at 20:57

## 2018-08-28 RX ADMIN — POTASSIUM CHLORIDE 20 MEQ: 1500 TABLET, EXTENDED RELEASE ORAL at 20:57

## 2018-08-28 RX ADMIN — ROPINIROLE HYDROCHLORIDE 1 MG: 1 TABLET, FILM COATED ORAL at 12:19

## 2018-08-28 RX ADMIN — ENOXAPARIN SODIUM 40 MG: 40 INJECTION SUBCUTANEOUS at 10:11

## 2018-08-28 RX ADMIN — Medication 10 ML: at 10:10

## 2018-08-28 RX ADMIN — PANTOPRAZOLE SODIUM 20 MG: 20 TABLET, DELAYED RELEASE ORAL at 10:11

## 2018-08-28 RX ADMIN — AMOXICILLIN AND CLAVULANATE POTASSIUM 1 TABLET: 875; 125 TABLET, FILM COATED ORAL at 20:57

## 2018-08-28 RX ADMIN — ROPINIROLE HYDROCHLORIDE 1 MG: 1 TABLET, FILM COATED ORAL at 10:11

## 2018-08-28 RX ADMIN — THERA TABS 1 TABLET: TAB at 10:11

## 2018-08-28 RX ADMIN — ASPIRIN 81 MG: 81 TABLET, CHEWABLE ORAL at 10:10

## 2018-08-28 RX ADMIN — CARBOXYMETHYLCELLULOSE SODIUM 1 DROP: 10 GEL OPHTHALMIC at 20:57

## 2018-08-28 RX ADMIN — AMOXICILLIN AND CLAVULANATE POTASSIUM 1 TABLET: 875; 125 TABLET, FILM COATED ORAL at 10:10

## 2018-08-28 RX ADMIN — PREGABALIN 150 MG: 75 CAPSULE ORAL at 20:57

## 2018-08-28 RX ADMIN — CARBOXYMETHYLCELLULOSE SODIUM 1 DROP: 10 GEL OPHTHALMIC at 10:10

## 2018-08-28 RX ADMIN — CARBOXYMETHYLCELLULOSE SODIUM 1 DROP: 10 GEL OPHTHALMIC at 05:51

## 2018-08-28 RX ADMIN — Medication 2 MG: at 21:06

## 2018-08-28 RX ADMIN — CALCIUM CARBONATE-CHOLECALCIFEROL TAB 250 MG-125 UNIT 250 MG: 250-125 TAB at 10:10

## 2018-08-28 RX ADMIN — CARBOXYMETHYLCELLULOSE SODIUM 1 DROP: 10 GEL OPHTHALMIC at 17:10

## 2018-08-28 RX ADMIN — Medication 100 MG: at 10:10

## 2018-08-28 RX ADMIN — MIRTAZAPINE 30 MG: 30 TABLET, FILM COATED ORAL at 20:57

## 2018-08-28 RX ADMIN — FOLIC ACID 1 MG: 1 TABLET ORAL at 10:11

## 2018-08-28 RX ADMIN — LABETALOL HYDROCHLORIDE 10 MG: 5 INJECTION, SOLUTION INTRAVENOUS at 01:57

## 2018-08-28 RX ADMIN — ROPINIROLE HYDROCHLORIDE 1 MG: 1 TABLET, FILM COATED ORAL at 17:10

## 2018-08-28 ASSESSMENT — PAIN SCALES - GENERAL
PAINLEVEL_OUTOF10: 3
PAINLEVEL_OUTOF10: 10
PAINLEVEL_OUTOF10: 0

## 2018-08-28 NOTE — PROGRESS NOTES
Static: Good  Sitting - Dynamic: Good;-  Standing - Static: Fair  Standing - Dynamic: Fair  Comments: standing balance assessed with RW       Exercises deferred due to RN arrival to [facility]     Assessment   Body structures, Functions, Activity limitations: Decreased functional mobility ; Decreased strength;Decreased cognition;Decreased safe awareness;Decreased endurance  Assessment: Pt amb 300' with RW requiring CGA, unsteady with directional changes. Pt to benefit from continued therapy and SNF placement to improve functional mobility, strength, and endurance. Prognosis: Good  REQUIRES PT FOLLOW UP: Yes  Activity Tolerance  Activity Tolerance: Patient Tolerated treatment well     Goals  Short term goals  Time Frame for Short term goals: 10  Short term goal 1: pt able to maintain alertness to participate in active mobility and exercise  Short term goal 2: pt independent with bed mobility  Short term goal 3: pt able to sit edge of bed x15 min to perform AROM-PRE exercise  Short term goal 4: pt able to sit to stand transfer supervision  Short term goal 5: Pt to ambulate 300ft SBA with least restrictive device    Plan    Plan  Times per week: 5-6x/wk  Times per day: Daily  Current Treatment Recommendations: Strengthening, ROM, Functional Mobility Training, Transfer Training, Endurance Training, Patient/Caregiver Education & Training, Positioning, Gait Training  Safety Devices  Type of devices: All fall risk precautions in place, Call light within reach, Gait belt, Patient at risk for falls, Left in chair, Telesitter in use, Sitter present, Nurse notified  Restraints  Initially in place: No     Therapy Time   Individual Concurrent Group Co-treatment   Time In 1153         Time Out 1212         Minutes 2 [facility]  Treatment performed by Student PTA under the supervision of co-signing PTA who agrees with all treatment and documentation.    Tito Walker PTA

## 2018-08-28 NOTE — PROGRESS NOTES
PATIENT REFUSES TO WEAR BIPAP     [x] Risks and benefits explained to patient   [x] Patient refuses to wear Bipap stating \"I'll call if I need it. \"  [x] Patient verbalizes understanding of information presented.

## 2018-08-28 NOTE — VIRTUAL HEALTH
150 mg, 150 mg, Oral, BID  melatonin tablet 2 mg, 2 mg, Oral, Nightly PRN  sodium chloride flush 0.9 % injection 10 mL, 10 mL, Intravenous, 2 times per day  sodium chloride flush 0.9 % injection 10 mL, 10 mL, Intravenous, PRN  magnesium hydroxide (MILK OF MAGNESIA) 400 MG/5ML suspension 30 mL, 30 mL, Oral, Daily PRN  ondansetron (ZOFRAN) injection 4 mg, 4 mg, Intravenous, Q6H PRN  glucose (GLUTOSE) 40 % oral gel 15 g, 15 g, Oral, PRN  dextrose 50 % solution 12.5 g, 12.5 g, Intravenous, PRN  glucagon (rDNA) injection 1 mg, 1 mg, Intramuscular, PRN  dextrose 5 % solution, 100 mL/hr, Intravenous, PRN       PAST PSYCHIATRIC HISTORY:  Refer to previous note     Past psychiatric medications include: N/A     Adverse reactions from psychotropic medications:  None reported    Lifetime Psychiatric Review of Systems:     Jacy: denies  Panic: denies  Phobia: denies  Hallucinations: denies  Delusions: denies     Past Medical History:        Diagnosis Date    Degenerative disc disease, lumbar 05/2018       Past Surgical History:        Procedure Laterality Date    EYE SURGERY  2013    cateract removal    TONSILLECTOMY Bilateral 1963       Allergies: Patient has no known allergies.       Social History:  Refer to previous note   Social History     Social History    Marital status: Single     Spouse name: N/A    Number of children: N/A    Years of education: 15     Occupational History   3 [employer]     Social History Main Topics    Smoking status: Current Every Day Smoker     Packs/day: 2.00     Years: 3.00     Types: Cigarettes    Smokeless tobacco: Never Used      Comment: on & off smoking    Alcohol use No    Drug use: No    Sexual activity: No     Other Topics Concern    None     Social History Narrative    None       Family Psychiatric History:  Depression- sister, mother    Family History:   Family History   Problem Relation Age of Onset   [employer] Cancer Mother     Cancer Father     Arthritis Sister Physical  BP (!) 125/59   Pulse 88   Temp 98.4 °F (36.9 °C) (Axillary)   Resp 20   Ht 5' 7\" (1.702 m)   Wt 193 lb 9 oz (87.8 kg)   SpO2 98%   BMI 30.32 kg/m²     MENTAL STATUS EXAM:  Level of consciousness:  arousable to prolonged physical stimulation and lethargic  Appearance:  hospital attire, lying in bed and fair grooming  Behavior/Motor:  Kept pulling her sheets up, trying to get comfortable to doze off  Attitude toward examiner:  cooperative, poor eye contact and uncooperative due to trying to rest   Speech:  normal volume and slurred  Mood:  Agitated   Affect:  mood congruent  Thought processes:  linear and coherent  Thought content:  Homocidal ideation denies  Suicidal Ideation:  denies suicidal ideation  Delusions:  no evidence of delusions  Perceptual Disturbance:  denies any perceptual disturbance  Cognition:  Unable to assess (patient sleeping)  Memory:  Unable to assess (patient sleeping)  Insight & Judgement: limited   Medication side effects: none reported      DSM-V DIAGNOSIS:  depression    Impression  : This patient was seen by the writer on Sunday as well. She has a history of agitation without destructive behavior. She endorses a history of saying things that she does not mean to get what she wants at the time; for ex: saying she wants to kill herself so that she can be left alone. I honestly do not feel that she is truly suicidal. I believe she made those statements out of anger as she reports.      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 May give up to 50 mg PO Qhs if ineffective for sleep/agitation. Hourly rounding without 1:1 monitoring should be sufficient. Please re-consult if any questions. Thank you very much for allowing us to participate in the care of this patient. Time spent > 60 min. Physicians Signature:  Electronically signed by DESMOND Garcia CNP on 8/28/2018 at 9:16 AM.    Dragon voice recognition software used in portions of this document.

## 2018-08-28 NOTE — PROGRESS NOTES
[address] [place] [address]     Department of Internal Medicine - Staff Internal Medicine Service     DAILY PROGRESS NOTE       Patient: Noe Deleon  YOB: 1955  MRN: [id number]     Acct: [de-identified]     Admit date: 8/21/2018  Admitting Diagnosis: Encephalopathy acute    Subjective:   Patient seen and examined at bedside. Alert and oriented right now. Stable vitally. Had episodes of agitation and restlessness overnight. On Suicidal precautions.      Objective:   BP (!) 125/59   Pulse 88   Temp 98.4 °F (36.9 °C) (Axillary)   Resp 20   Ht 5' 7\" (1.702 m)   Wt 193 lb 9 oz (87.8 kg)   SpO2 98%   BMI 30.32 kg/m²       General appearance - alert, well appearing, and in no distress  Mental status - alert, oriented to person, place, and time  Chest - clear to auscultation, no wheezes, rales or rhonchi, symmetric air entry  Heart - normal rate, regular rhythm, normal S1, S2, no murmurs, rubs, clicks or gallops  Abdomen - soft, nontender, nondistended, no masses or organomegaly  Neurological - alert, oriented, normal speech, no focal findings or movement disorder noted  Musculoskeletal - no joint tenderness, deformity or swelling  Extremities - peripheral pulses normal, no pedal edema, no clubbing or cyanosis  Skin - normal coloration and turgor, no rashes, no suspicious skin lesions noted      Intake/Output Summary (Last 24 hours) at 08/28/18 1114  Last data filed at 08/28/18 0400   Gross per 24 hour   Intake                0 ml   Output             1700 ml   Net            -1700 ml         Medications:      thiamine  100 mg Oral Daily    oyster shell calcium/vitamin D  1 tablet Oral Daily    rOPINIRole  1 mg Oral 4x Daily    folic acid  1 mg Oral Daily    multivitamin  1 tablet Oral Daily    enoxaparin  40 mg Subcutaneous Daily    pantoprazole  20 mg Oral QAM AC    aspirin  81 mg Oral Daily    traZODone  50 mg Oral Nightly    mirtazapine  30 mg Oral Nightly    clonazePAM  1 mg

## 2018-08-28 NOTE — PROGRESS NOTES
Was paged by the nurse for patient becoming increasingly agitated. Patient states she has restless legs and is unable to sleep at this time. Would like to walk around the unit, however, nurses have taken her on 3 walks and state that she is unsteady on her feet. Patient has received Klonopin, Haldol, Remeron, Lyrica, Melotonin, and Requip. Patient stated to me that she has depression and has previous suicidal ideations but states has no plan currently. However, she stated to the nurse that this birthday was going to be her last and she was going home to end it. See nurses note for more. Sitter in the room currently. Ordered suicide precautions and psychiatry consult. Will inform primary in the morning. Jayant Florence MD  Internal Medicine Resident, PGY-1  [facility];  [facility], [place]  8/28/2018, 12:01 AM

## 2018-08-28 NOTE — PROGRESS NOTES
[facility]  Occupational Therapy Not Seen Note    DATE: 2018  Name: Shyla Schulz  : 1955  MRN: [id number]    Patient not available for Occupational Therapy due to:    [] Testing:    [] Hemodialysis    [] Blood Transfusion in Progress    [x]Refusal by Patient: Pt not willing to get OOB to participate at this time. Pt encouraged and informed of importance to participate to stay strong. Pt is aware of importance but still refuses. [] Surgery/Procedure:    [] Strict Bedrest    [] Sedation    [] Spine Precautions     [] Pt being transferred to palliative care at this time. Spoke with pt/family and OT services to be defered. [] Pt independent with functional mobility and functional tasks.  Pt with no OT acute care needs at this time, will defer OT eval.    [] Other    Next Scheduled Treatment:  18    Signature: LANDRY Jalloh

## 2018-08-29 PROBLEM — F41.9 ANXIETY: Status: ACTIVE | Noted: 2018-08-29

## 2018-08-29 LAB
ANION GAP SERPL CALCULATED.3IONS-SCNC: 12 MMOL/L (ref 9–17)
BUN BLDV-MCNC: 14 MG/DL (ref 8–23)
BUN/CREAT BLD: ABNORMAL (ref 9–20)
CALCIUM SERPL-MCNC: 8.2 MG/DL (ref 8.6–10.4)
CHLORIDE BLD-SCNC: 102 MMOL/L (ref 98–107)
CO2: 27 MMOL/L (ref 20–31)
CREAT SERPL-MCNC: 0.54 MG/DL (ref 0.5–0.9)
EKG ATRIAL RATE: 96 BPM
EKG ATRIAL RATE: 99 BPM
EKG P AXIS: 41 DEGREES
EKG P AXIS: 52 DEGREES
EKG P-R INTERVAL: 126 MS
EKG P-R INTERVAL: 128 MS
EKG Q-T INTERVAL: 366 MS
EKG Q-T INTERVAL: 386 MS
EKG QRS DURATION: 90 MS
EKG QRS DURATION: 94 MS
EKG QTC CALCULATION (BAZETT): 469 MS
EKG QTC CALCULATION (BAZETT): 487 MS
EKG R AXIS: 14 DEGREES
EKG R AXIS: 42 DEGREES
EKG T AXIS: 12 DEGREES
EKG T AXIS: 50 DEGREES
EKG VENTRICULAR RATE: 96 BPM
EKG VENTRICULAR RATE: 99 BPM
GFR AFRICAN AMERICAN: >60 ML/MIN
GFR NON-AFRICAN AMERICAN: >60 ML/MIN
GFR SERPL CREATININE-BSD FRML MDRD: ABNORMAL ML/MIN/{1.73_M2}
GFR SERPL CREATININE-BSD FRML MDRD: ABNORMAL ML/MIN/{1.73_M2}
GLUCOSE BLD-MCNC: 100 MG/DL (ref 70–99)
MAGNESIUM: 2.2 MG/DL (ref 1.6–2.6)
POTASSIUM SERPL-SCNC: 3.4 MMOL/L (ref 3.7–5.3)
SODIUM BLD-SCNC: 141 MMOL/L (ref 135–144)

## 2018-08-29 PROCEDURE — 97166 OT EVAL MOD COMPLEX 45 MIN: CPT

## 2018-08-29 PROCEDURE — 6370000000 HC RX 637 (ALT 250 FOR IP): Performed by: HOSPITALIST

## 2018-08-29 PROCEDURE — 6360000002 HC RX W HCPCS: Performed by: INTERNAL MEDICINE

## 2018-08-29 PROCEDURE — 6370000000 HC RX 637 (ALT 250 FOR IP): Performed by: STUDENT IN AN ORGANIZED HEALTH CARE EDUCATION/TRAINING PROGRAM

## 2018-08-29 PROCEDURE — 36415 COLL VENOUS BLD VENIPUNCTURE: CPT

## 2018-08-29 PROCEDURE — 83735 ASSAY OF MAGNESIUM: CPT

## 2018-08-29 PROCEDURE — 6370000000 HC RX 637 (ALT 250 FOR IP): Performed by: EMERGENCY MEDICINE

## 2018-08-29 PROCEDURE — 94762 N-INVAS EAR/PLS OXIMTRY CONT: CPT

## 2018-08-29 PROCEDURE — 2580000003 HC RX 258: Performed by: HOSPITALIST

## 2018-08-29 PROCEDURE — G8988 SELF CARE GOAL STATUS: HCPCS

## 2018-08-29 PROCEDURE — 97535 SELF CARE MNGMENT TRAINING: CPT

## 2018-08-29 PROCEDURE — 99233 SBSQ HOSP IP/OBS HIGH 50: CPT | Performed by: INTERNAL MEDICINE

## 2018-08-29 PROCEDURE — 80048 BASIC METABOLIC PNL TOTAL CA: CPT

## 2018-08-29 PROCEDURE — 6360000002 HC RX W HCPCS: Performed by: STUDENT IN AN ORGANIZED HEALTH CARE EDUCATION/TRAINING PROGRAM

## 2018-08-29 PROCEDURE — 2060000000 HC ICU INTERMEDIATE R&B

## 2018-08-29 PROCEDURE — G8987 SELF CARE CURRENT STATUS: HCPCS

## 2018-08-29 RX ORDER — MULTIVITAMIN WITH FOLIC ACID 400 MCG
1 TABLET ORAL DAILY
Qty: 30 TABLET | Refills: 3 | Status: SHIPPED | OUTPATIENT
Start: 2018-08-30

## 2018-08-29 RX ORDER — UREA 10 %
2 LOTION (ML) TOPICAL NIGHTLY PRN
Qty: 30 TABLET | Refills: 0 | Status: SHIPPED | OUTPATIENT
Start: 2018-08-29 | End: 2018-09-11

## 2018-08-29 RX ORDER — ECHINACEA PURPUREA EXTRACT 125 MG
1 TABLET ORAL PRN
Qty: 1 BOTTLE | Refills: 3 | Status: SHIPPED | OUTPATIENT
Start: 2018-08-29 | End: 2019-02-13 | Stop reason: ALTCHOICE

## 2018-08-29 RX ORDER — LANOLIN ALCOHOL/MO/W.PET/CERES
100 CREAM (GRAM) TOPICAL DAILY
Qty: 30 TABLET | Refills: 3 | Status: SHIPPED | OUTPATIENT
Start: 2018-08-30 | End: 2019-02-13 | Stop reason: ALTCHOICE

## 2018-08-29 RX ORDER — CLONAZEPAM 1 MG/1
1 TABLET ORAL 2 TIMES DAILY
Qty: 60 TABLET | Refills: 0 | Status: SHIPPED | OUTPATIENT
Start: 2018-08-29 | End: 2019-03-08 | Stop reason: SDUPTHER

## 2018-08-29 RX ORDER — QUETIAPINE FUMARATE 25 MG/1
25 TABLET, FILM COATED ORAL EVERY EVENING
Qty: 60 TABLET | Refills: 3 | Status: SHIPPED | OUTPATIENT
Start: 2018-08-29 | End: 2019-02-13 | Stop reason: ALTCHOICE

## 2018-08-29 RX ORDER — FUROSEMIDE 20 MG/1
20 TABLET ORAL DAILY
Qty: 5 TABLET | Refills: 0 | Status: SHIPPED | OUTPATIENT
Start: 2018-08-29 | End: 2019-02-13 | Stop reason: ALTCHOICE

## 2018-08-29 RX ORDER — BENZONATATE 100 MG/1
100 CAPSULE ORAL 3 TIMES DAILY PRN
Qty: 30 CAPSULE | Refills: 0 | Status: SHIPPED | OUTPATIENT
Start: 2018-08-29 | End: 2018-09-05

## 2018-08-29 RX ORDER — QUETIAPINE FUMARATE 25 MG/1
12.5 TABLET, FILM COATED ORAL
Qty: 60 TABLET | Refills: 3 | Status: SHIPPED | OUTPATIENT
Start: 2018-08-30 | End: 2019-02-13 | Stop reason: ALTCHOICE

## 2018-08-29 RX ORDER — BENZONATATE 100 MG/1
100 CAPSULE ORAL 3 TIMES DAILY PRN
Status: DISCONTINUED | OUTPATIENT
Start: 2018-08-29 | End: 2018-08-31 | Stop reason: HOSPADM

## 2018-08-29 RX ORDER — ERGOCALCIFEROL (VITAMIN D2) 1250 MCG
50000 CAPSULE ORAL WEEKLY
Qty: 30 CAPSULE | Refills: 1 | Status: SHIPPED | OUTPATIENT
Start: 2018-08-30 | End: 2019-02-13 | Stop reason: ALTCHOICE

## 2018-08-29 RX ORDER — ACETAMINOPHEN 325 MG/1
325 TABLET ORAL EVERY 6 HOURS PRN
Status: DISCONTINUED | OUTPATIENT
Start: 2018-08-29 | End: 2018-08-31 | Stop reason: HOSPADM

## 2018-08-29 RX ORDER — AMOXICILLIN AND CLAVULANATE POTASSIUM 875; 125 MG/1; MG/1
1 TABLET, FILM COATED ORAL EVERY 12 HOURS SCHEDULED
Qty: 20 TABLET | Refills: 0 | Status: SHIPPED | OUTPATIENT
Start: 2018-08-29 | End: 2018-09-08

## 2018-08-29 RX ORDER — MIRTAZAPINE 30 MG/1
30 TABLET, FILM COATED ORAL NIGHTLY
Qty: 30 TABLET | Refills: 3 | Status: SHIPPED | OUTPATIENT
Start: 2018-08-29 | End: 2018-10-29 | Stop reason: SDUPTHER

## 2018-08-29 RX ORDER — PREGABALIN 150 MG/1
150 CAPSULE ORAL 2 TIMES DAILY
Qty: 60 CAPSULE | Refills: 0 | Status: SHIPPED | OUTPATIENT
Start: 2018-08-29 | End: 2019-06-27

## 2018-08-29 RX ORDER — ROPINIROLE 1 MG/1
1 TABLET, FILM COATED ORAL 4 TIMES DAILY
Qty: 90 TABLET | Refills: 3 | Status: SHIPPED | OUTPATIENT
Start: 2018-08-29

## 2018-08-29 RX ORDER — ASPIRIN 81 MG/1
81 TABLET, CHEWABLE ORAL DAILY
Qty: 30 TABLET | Refills: 3 | Status: SHIPPED | OUTPATIENT
Start: 2018-08-30 | End: 2019-03-08 | Stop reason: SDUPTHER

## 2018-08-29 RX ORDER — CARBOXYMETHYLCELLULOSE SODIUM 10 MG/ML
1 GEL OPHTHALMIC EVERY 4 HOURS PRN
Qty: 1 BOTTLE | Refills: 1 | Status: SHIPPED | OUTPATIENT
Start: 2018-08-29 | End: 2019-03-08 | Stop reason: ALTCHOICE

## 2018-08-29 RX ORDER — FOLIC ACID 1 MG/1
1 TABLET ORAL DAILY
Qty: 30 TABLET | Refills: 3 | Status: SHIPPED | OUTPATIENT
Start: 2018-08-30 | End: 2019-02-13 | Stop reason: ALTCHOICE

## 2018-08-29 RX ADMIN — ROPINIROLE HYDROCHLORIDE 1 MG: 1 TABLET, FILM COATED ORAL at 09:08

## 2018-08-29 RX ADMIN — POTASSIUM CHLORIDE 40 MEQ: 40 SOLUTION ORAL at 09:10

## 2018-08-29 RX ADMIN — ENOXAPARIN SODIUM 40 MG: 40 INJECTION SUBCUTANEOUS at 09:09

## 2018-08-29 RX ADMIN — Medication 10 ML: at 09:08

## 2018-08-29 RX ADMIN — MAGNESIUM SULFATE IN DEXTROSE 1 G: 10 INJECTION, SOLUTION INTRAVENOUS at 01:12

## 2018-08-29 RX ADMIN — ROPINIROLE HYDROCHLORIDE 1 MG: 1 TABLET, FILM COATED ORAL at 17:29

## 2018-08-29 RX ADMIN — Medication 100 MG: at 09:09

## 2018-08-29 RX ADMIN — Medication 10 ML: at 22:11

## 2018-08-29 RX ADMIN — ASPIRIN 81 MG: 81 TABLET, CHEWABLE ORAL at 09:09

## 2018-08-29 RX ADMIN — BENZONATATE 100 MG: 100 CAPSULE ORAL at 01:53

## 2018-08-29 RX ADMIN — ACETAMINOPHEN 325 MG: 325 TABLET ORAL at 21:45

## 2018-08-29 RX ADMIN — THERA TABS 1 TABLET: TAB at 09:08

## 2018-08-29 RX ADMIN — MAGNESIUM SULFATE IN DEXTROSE 1 G: 10 INJECTION, SOLUTION INTRAVENOUS at 00:04

## 2018-08-29 RX ADMIN — AMOXICILLIN AND CLAVULANATE POTASSIUM 1 TABLET: 875; 125 TABLET, FILM COATED ORAL at 21:10

## 2018-08-29 RX ADMIN — ROPINIROLE HYDROCHLORIDE 1 MG: 1 TABLET, FILM COATED ORAL at 21:10

## 2018-08-29 RX ADMIN — FOLIC ACID 1 MG: 1 TABLET ORAL at 09:09

## 2018-08-29 RX ADMIN — ROPINIROLE HYDROCHLORIDE 1 MG: 1 TABLET, FILM COATED ORAL at 12:02

## 2018-08-29 RX ADMIN — PANTOPRAZOLE SODIUM 20 MG: 20 TABLET, DELAYED RELEASE ORAL at 09:09

## 2018-08-29 RX ADMIN — AMOXICILLIN AND CLAVULANATE POTASSIUM 1 TABLET: 875; 125 TABLET, FILM COATED ORAL at 09:09

## 2018-08-29 RX ADMIN — CALCIUM CARBONATE-CHOLECALCIFEROL TAB 250 MG-125 UNIT 250 MG: 250-125 TAB at 09:09

## 2018-08-29 RX ADMIN — QUETIAPINE FUMARATE 25 MG: 25 TABLET ORAL at 17:29

## 2018-08-29 RX ADMIN — QUETIAPINE FUMARATE 12.5 MG: 25 TABLET ORAL at 09:09

## 2018-08-29 RX ADMIN — CLONAZEPAM 1 MG: 1 TABLET ORAL at 21:10

## 2018-08-29 RX ADMIN — MIRTAZAPINE 30 MG: 30 TABLET, FILM COATED ORAL at 21:10

## 2018-08-29 RX ADMIN — PREGABALIN 150 MG: 75 CAPSULE ORAL at 21:10

## 2018-08-29 RX ADMIN — PREGABALIN 150 MG: 75 CAPSULE ORAL at 09:08

## 2018-08-29 ASSESSMENT — PAIN DESCRIPTION - PROGRESSION: CLINICAL_PROGRESSION: GRADUALLY WORSENING

## 2018-08-29 ASSESSMENT — PAIN SCALES - GENERAL: PAINLEVEL_OUTOF10: 3

## 2018-08-29 ASSESSMENT — PAIN DESCRIPTION - FREQUENCY: FREQUENCY: INTERMITTENT

## 2018-08-29 ASSESSMENT — PAIN DESCRIPTION - LOCATION: LOCATION: HEAD

## 2018-08-29 ASSESSMENT — PAIN DESCRIPTION - PAIN TYPE: TYPE: ACUTE PAIN

## 2018-08-29 ASSESSMENT — PAIN DESCRIPTION - DESCRIPTORS: DESCRIPTORS: HEADACHE

## 2018-08-29 ASSESSMENT — PAIN DESCRIPTION - ONSET: ONSET: GRADUAL

## 2018-08-29 NOTE — PROGRESS NOTES
Spoke to the patient and the sister who who was visiting patient at this time. Persistent patient is not suicidal at baseline, but has issues with going to sleep since past 20/30 years. Sister also reports that patient is in the habit of pacing through her house at bedtime, because it is very difficult for her to sleep with the restless legs. And she has had these troubles since many years. Patient denies any suicidal/homicidal ideation at the moment. And denies any homicidal in any way or anybody else, I just did last night because I was drugged with medications and was unable to sleep and people were \" telling me\" to go to sleep. QTC from previous EKG noted. Given patient is on medications known for QTC elevation, will do EKG to monitor the QTC.       Kp Mccullough MD  PGY-2, Internal Medicine  [address] [facility]

## 2018-08-29 NOTE — PROGRESS NOTES
Home Oxygen Evaluation    Home Oxygen Evaluation not necessary as patient is to be discharged to a facility rather than a private home.   ALYSSA NELSON  1:10 PM

## 2018-08-29 NOTE — PLAN OF CARE
Problem: Falls - Risk of:  Goal: Will remain free from falls  Will remain free from falls   Outcome: Ongoing  Pt remains free of falls at this time. Bed locked in lowest position, siderails x3, call light in reach. Non-skid footwear applied. Pt ambulates in room with steady gait and nurse assist. Encouraged pt to call for assistance as needed for safety. Falling star posted outside of room. Will continue to monitor.

## 2018-08-29 NOTE — PLAN OF CARE
Problem: Falls - Risk of:  Goal: Will remain free from falls  Will remain free from falls   Outcome: Ongoing  Pt remains free of falls at this time. Bed locked in lowest position, siderails x2, call light in reach. Non-skid footwear applied. Pt ambulates in room with steady gait. Encouraged pt to call for assistance as needed for safety. Falling star posted outside of room. Will continue to monitor.

## 2018-08-29 NOTE — PROGRESS NOTES
Occupational Therapy   Occupational Therapy Initial Assessment  Date: 2018   Patient Name: Preeti Morton  MRN: [id number]     : 1955    Date of Service: 2018    Discharge Recommendations:  [address], Continue to assess pending progress (pt agreeable to SNF. Pt does not feel safe to go home)  OT Equipment Recommendations  Equipment Needed: No      Patient Diagnosis(es): The primary encounter diagnosis was Unresponsive. Diagnoses of Other specified hypotension, Elevated lactic acid level, Acute kidney injury ([address] Anxiety, and Restless leg syndrome were also pertinent to this visit. has a past medical history of Degenerative disc disease, lumbar. has a past surgical history that includes Tonsillectomy (Bilateral, ) and eye surgery (). Chief Complaint   Patient presents with    Altered Mental Status     Pt found unresponsive, \"didnt report to work\" unknown LKW     Note from Emergency Medicine dated 18  Preeti Morton is a 66 y.o. female who presents with Found unresponsive at home. Patient reportedly did not show up for work this morning. Unknown for past medical history, however possible history of distant stroke, as well as drug overdose history. No family in room. Per EMS patient was hypotensive and bradycardic at times requiring pacing. Patient was intubated prior to arrival.  Patient was unresponsive at time of arrival to emergency department, with approval of heart rate and blood pressure. Restrictions  Restrictions/Precautions  Restrictions/Precautions: Fall Risk, General Precautions  Required Braces or Orthoses?: No    Subjective   General  Chart Reviewed: No  Patient assessed for rehabilitation services?: Yes  Family / Caregiver Present: No  General Comment  Comments: RN ok'd for therapy this PM. Pt was agreeable and cooperative throughout.    Pain Assessment  Patient Currently in Pain: Denies     Social/Functional Normotonic  Tone LUE  LUE Tone: Normotonic  Coordination  Movements Are Fluid And Coordinated: No  Coordination and Movement description: Decreased speed     Bed mobility  Comment: pt sitting in chair upon arrival   Transfers  Sit to stand: Stand by assistance  Stand to sit: Stand by assistance     Cognition  Overall Cognitive Status: WFL        Sensation  Overall Sensation Status: WFL      LUE AROM : WFL  RUE AROM : WFL  LUE Strength  Gross LUE Strength: WFL  L Hand Grasp: 5/5  L Hand Release: 5/5  RUE Strength  Gross RUE Strength: WFL  R Hand Grasp: 5/5  R Hand Release: 5/5     Assessment   Performance deficits / Impairments: Decreased ADL status; Decreased high-level IADLs;Decreased functional mobility ; Decreased safe awareness;Decreased endurance  Prognosis: Good  Decision Making: Medium Complexity  Patient Education: OT POC, discharge recs, safety awareness, funct mob/funct transfer techs, good return. REQUIRES OT FOLLOW UP: Yes  Activity Tolerance  Activity Tolerance: Patient Tolerated treatment well  Safety Devices  Safety Devices in place: Yes  Type of devices: Nurse notified; Patient at risk for falls; All fall risk precautions in place;Call light within reach  Restraints  Initially in place: No         Plan   Plan  Times per week: 2-3x/wk  Current Treatment Recommendations: Self-Care / ADL, Home Management Training, Safety Education & Training, Functional Mobility Training, Patient/Caregiver Education & Training    G-Code  OT G-codes  Functional Assessment Tool Used: [place] AMPAC  Score: 19/24  Functional Limitation: Self care  Self Care Current Status (): At least 40 percent but less than 60 percent impaired, limited or restricted  Self Care Goal Status (): At least 20 percent but less than 40 percent impaired, limited or restricted    How much help from another person does the pt currently need? Unable A Lot A Little None   1. Putting on and taking off regular lower body clothing?       1      2 3       4   2. Bathing (including washing, rinsing, drying)? 1      2      3      4   3. Toileting, which includes using toilet, bedpan, or urinal?      1      2        3      4   4. Putting on and taking off regular upper body clothing? 1      2      3       4   5. Taking care of personal grooming such as brushing teeth? 1      2      3      4   6. Eating meals? 1      2       3       4     1. Unable = Total/Dependent Assist  2. A Lot = Maximum/Moderate Assist  3. A Little = Minimum/Contact Guard Assist/Supervision  4. None= Modified [place]/Independent    Raw Score Scale Score Scale Score Standard Error Approximate Degree of Functional Impairment     6 17.07 3.74 100%   7 20.13 3.68 92%   8 22.86 3.43 86%   9 25.33 3.17 80%   10 27.31 2.96 75%   11 29.04 2.79 70%   12 30.60 2.68 67%   13 32.03 2.62 63%   14 33.39 2.61 60%   15 34.69 2.65 56%   16 35.96 2.71 53%   17 37.26 2.82 50%   18 38.66 2.97 47%   19 40.22 3.20 43%   20 42.03 3.55 38%   21 44.27 4.08 33%   22 47.10 4.81 26%   23 51.12 5.88 16%   24 57.54 7.36 0%     Goals  Short term goals  Time Frame for Short term goals: Pt will, by discharge:  Short term goal 1: independently demo UB/LB ADL task. Short term goal 2: independently demo funct mob/funct transfer. Short term goal 3: independently demo safety awareness during funct mob/funct transfer. Short term goal 4: independently demo pursed lip breathing in 2/3 sessions. Short term goal 5: independently demo dynamic standing for 20+ minutes during functional task. Therapy Time   Individual Concurrent Group Co-treatment   Time In 1226         Time Out 1511         Minutes 38              Discharge recommendations discussed with patient during initial evaluation.       Noé, OTS

## 2018-08-30 LAB
ABSOLUTE EOS #: 0.34 K/UL (ref 0–0.44)
ABSOLUTE IMMATURE GRANULOCYTE: 0.04 K/UL (ref 0–0.3)
ABSOLUTE LYMPH #: 1.65 K/UL (ref 1.1–3.7)
ABSOLUTE MONO #: 0.8 K/UL (ref 0.1–1.2)
ANION GAP SERPL CALCULATED.3IONS-SCNC: 9 MMOL/L (ref 9–17)
BASOPHILS # BLD: 1 % (ref 0–2)
BASOPHILS ABSOLUTE: 0.03 K/UL (ref 0–0.2)
BUN BLDV-MCNC: 18 MG/DL (ref 8–23)
BUN/CREAT BLD: ABNORMAL (ref 9–20)
CALCIUM SERPL-MCNC: 8.3 MG/DL (ref 8.6–10.4)
CHLORIDE BLD-SCNC: 106 MMOL/L (ref 98–107)
CO2: 26 MMOL/L (ref 20–31)
CREAT SERPL-MCNC: 0.45 MG/DL (ref 0.5–0.9)
DIFFERENTIAL TYPE: ABNORMAL
EOSINOPHILS RELATIVE PERCENT: 5 % (ref 1–4)
GFR AFRICAN AMERICAN: >60 ML/MIN
GFR NON-AFRICAN AMERICAN: >60 ML/MIN
GFR SERPL CREATININE-BSD FRML MDRD: ABNORMAL ML/MIN/{1.73_M2}
GFR SERPL CREATININE-BSD FRML MDRD: ABNORMAL ML/MIN/{1.73_M2}
GLUCOSE BLD-MCNC: 102 MG/DL (ref 70–99)
HCT VFR BLD CALC: 39 % (ref 36.3–47.1)
HEMOGLOBIN: 12.4 G/DL (ref 11.9–15.1)
IMMATURE GRANULOCYTES: 1 %
LYMPHOCYTES # BLD: 25 % (ref 24–43)
MCH RBC QN AUTO: 31.5 PG (ref 25.2–33.5)
MCHC RBC AUTO-ENTMCNC: 31.8 G/DL (ref 28.4–34.8)
MCV RBC AUTO: 99 FL (ref 82.6–102.9)
MONOCYTES # BLD: 12 % (ref 3–12)
NRBC AUTOMATED: 0 PER 100 WBC
PDW BLD-RTO: 13.7 % (ref 11.8–14.4)
PLATELET # BLD: 222 K/UL (ref 138–453)
PLATELET ESTIMATE: ABNORMAL
PMV BLD AUTO: 10.7 FL (ref 8.1–13.5)
POTASSIUM SERPL-SCNC: 3.8 MMOL/L (ref 3.7–5.3)
RBC # BLD: 3.94 M/UL (ref 3.95–5.11)
RBC # BLD: ABNORMAL 10*6/UL
SEG NEUTROPHILS: 57 % (ref 36–65)
SEGMENTED NEUTROPHILS ABSOLUTE COUNT: 3.77 K/UL (ref 1.5–8.1)
SODIUM BLD-SCNC: 141 MMOL/L (ref 135–144)
WBC # BLD: 6.6 K/UL (ref 3.5–11.3)
WBC # BLD: ABNORMAL 10*3/UL

## 2018-08-30 PROCEDURE — 6370000000 HC RX 637 (ALT 250 FOR IP): Performed by: STUDENT IN AN ORGANIZED HEALTH CARE EDUCATION/TRAINING PROGRAM

## 2018-08-30 PROCEDURE — 6370000000 HC RX 637 (ALT 250 FOR IP): Performed by: HOSPITALIST

## 2018-08-30 PROCEDURE — 2580000003 HC RX 258: Performed by: HOSPITALIST

## 2018-08-30 PROCEDURE — 6370000000 HC RX 637 (ALT 250 FOR IP): Performed by: EMERGENCY MEDICINE

## 2018-08-30 PROCEDURE — 36415 COLL VENOUS BLD VENIPUNCTURE: CPT

## 2018-08-30 PROCEDURE — 80048 BASIC METABOLIC PNL TOTAL CA: CPT

## 2018-08-30 PROCEDURE — 6360000002 HC RX W HCPCS: Performed by: INTERNAL MEDICINE

## 2018-08-30 PROCEDURE — 2060000000 HC ICU INTERMEDIATE R&B

## 2018-08-30 PROCEDURE — 85025 COMPLETE CBC W/AUTO DIFF WBC: CPT

## 2018-08-30 PROCEDURE — 99233 SBSQ HOSP IP/OBS HIGH 50: CPT | Performed by: INTERNAL MEDICINE

## 2018-08-30 RX ADMIN — ROPINIROLE HYDROCHLORIDE 1 MG: 1 TABLET, FILM COATED ORAL at 17:10

## 2018-08-30 RX ADMIN — AMOXICILLIN AND CLAVULANATE POTASSIUM 1 TABLET: 875; 125 TABLET, FILM COATED ORAL at 21:25

## 2018-08-30 RX ADMIN — CLONAZEPAM 1 MG: 1 TABLET ORAL at 21:28

## 2018-08-30 RX ADMIN — FOLIC ACID 1 MG: 1 TABLET ORAL at 09:43

## 2018-08-30 RX ADMIN — Medication 10 ML: at 21:25

## 2018-08-30 RX ADMIN — CALCIUM CARBONATE-CHOLECALCIFEROL TAB 250 MG-125 UNIT 250 MG: 250-125 TAB at 09:42

## 2018-08-30 RX ADMIN — ASPIRIN 81 MG: 81 TABLET, CHEWABLE ORAL at 09:43

## 2018-08-30 RX ADMIN — Medication 2 MG: at 21:25

## 2018-08-30 RX ADMIN — OXYCODONE HYDROCHLORIDE 10 MG: 5 TABLET ORAL at 21:28

## 2018-08-30 RX ADMIN — ROPINIROLE HYDROCHLORIDE 1 MG: 1 TABLET, FILM COATED ORAL at 21:25

## 2018-08-30 RX ADMIN — THERA TABS 1 TABLET: TAB at 09:43

## 2018-08-30 RX ADMIN — QUETIAPINE FUMARATE 12.5 MG: 25 TABLET ORAL at 09:44

## 2018-08-30 RX ADMIN — PREGABALIN 150 MG: 75 CAPSULE ORAL at 09:43

## 2018-08-30 RX ADMIN — MIRTAZAPINE 30 MG: 30 TABLET, FILM COATED ORAL at 21:25

## 2018-08-30 RX ADMIN — ERGOCALCIFEROL 50000 UNITS: 1.25 CAPSULE ORAL at 09:42

## 2018-08-30 RX ADMIN — QUETIAPINE FUMARATE 25 MG: 25 TABLET ORAL at 17:10

## 2018-08-30 RX ADMIN — Medication 100 MG: at 09:42

## 2018-08-30 RX ADMIN — ENOXAPARIN SODIUM 40 MG: 40 INJECTION SUBCUTANEOUS at 09:43

## 2018-08-30 RX ADMIN — ROPINIROLE HYDROCHLORIDE 1 MG: 1 TABLET, FILM COATED ORAL at 09:43

## 2018-08-30 RX ADMIN — AMOXICILLIN AND CLAVULANATE POTASSIUM 1 TABLET: 875; 125 TABLET, FILM COATED ORAL at 09:43

## 2018-08-30 RX ADMIN — OXYCODONE HYDROCHLORIDE 10 MG: 5 TABLET ORAL at 03:19

## 2018-08-30 RX ADMIN — ROPINIROLE HYDROCHLORIDE 1 MG: 1 TABLET, FILM COATED ORAL at 13:14

## 2018-08-30 RX ADMIN — PANTOPRAZOLE SODIUM 20 MG: 20 TABLET, DELAYED RELEASE ORAL at 10:54

## 2018-08-30 RX ADMIN — PREGABALIN 150 MG: 75 CAPSULE ORAL at 21:25

## 2018-08-30 RX ADMIN — Medication 10 ML: at 09:42

## 2018-08-30 ASSESSMENT — PAIN SCALES - GENERAL
PAINLEVEL_OUTOF10: 7
PAINLEVEL_OUTOF10: 7

## 2018-08-30 NOTE — PROGRESS NOTES
[address] [place] [address]     Department of Internal Medicine - Staff Internal Medicine Service     DAILY PROGRESS NOTE       Patient: Claudeen Kallman  YOB: 1955  MRN: [id number]     Acct: [de-identified]     Admit date: 8/21/2018  Admitting Diagnosis: Encephalopathy acute    Subjective:   Patient seen and examined at bedside. No acute events overnight. Alert and oriented. Vitally Stable. No more on O2 via nasal canula.      Objective:   BP (!) 132/59   Pulse 83   Temp 97.9 °F (36.6 °C) (Oral)   Resp 20   Ht 5' 7\" (1.702 m)   Wt 197 lb 5 oz (89.5 kg)   SpO2 92%   BMI 30.90 kg/m²       General appearance - alert, well appearing, and in no distress  Mouth - mucous membranes moist, pharynx normal without lesions  Chest - clear to auscultation, no wheezes, rales or rhonchi, symmetric air entry  Heart - normal rate, regular rhythm, normal S1, S2, no murmurs, rubs, clicks or gallops  Abdomen - soft, nontender, nondistended, no masses or organomegaly  Neurological - alert, oriented, normal speech, no focal findings or movement disorder noted  Musculoskeletal - no joint tenderness, deformity or swelling  Extremities - peripheral pulses normal, no pedal edema, no clubbing or cyanosis  Skin - normal coloration and turgor, no rashes, no suspicious skin lesions noted      Intake/Output Summary (Last 24 hours) at 08/30/18 1021  Last data filed at 08/30/18 0149   Gross per 24 hour   Intake             1810 ml   Output                0 ml   Net             1810 ml         Medications:      potassium chloride  20 mEq Oral Once    amoxicillin-clavulanate  1 tablet Oral 2 times per day    QUEtiapine  12.5 mg Oral QAM AC    QUEtiapine  25 mg Oral QPM    thiamine  100 mg Oral Daily    oyster shell calcium/vitamin D  1 tablet Oral Daily    rOPINIRole  1 mg Oral 4x Daily    folic acid  1 mg Oral Daily    multivitamin  1 tablet Oral Daily    enoxaparin  40 mg Subcutaneous Daily    pantoprazole  20 mg

## 2018-08-30 NOTE — PROGRESS NOTES
Nutrition Assessment    Type and Reason for Visit: Reassess    Nutrition Recommendations:   - Continue current General diet with Ensure supplements. - Encourage/monitor intakes as tolerated. Malnutrition Assessment:  · Malnutrition Status: No malnutrition    Nutrition Diagnosis: Resolving  · Problem: Inadequate oral intake  · Etiology: related to current medical condition     Signs and symptoms:  as evidenced by variable PO intakes and need for oral supplements    Nutrition Assessment:  · Subjective Assessment: Pt continues to tolerate an oral diet well. Pt also has been drinking Ensure supplements and likes them. · Nutrition-Focused Physical Findings: Active bowel sounds. Non-pitting generalized, BUE, and BLE edema. · Wound Type:  (scattered abrasions, left thigh and buttocks deep tissue injury, skin tears )  · Current Nutrition Therapies:  · Oral Diet Orders: General   · Oral Diet intake: 51-75%, %  · Oral Nutrition Supplement (ONS) Orders: Standard High Calorie Oral Supplement  · ONS intake: 51-75%, %  · Anthropometric Measures:  · Ht: 5' 7\" (170.2 cm)   · Current Body Wt: 197 lb 5 oz (89.5 kg)  · Admission Body Wt: 205 lb 14.6 oz (93.4 kg)  · Ideal Body Wt: 135 lb (61.2 kg), % Ideal Body 150%  · BMI Classification: BMI 30.0 - 34.9 Obese Class I  · Comparative Standards (Estimated Nutrition Needs):  · Estimated Daily Total Kcal: 3707-4215 kcal/day  · Estimated Daily Protein (g):  g pro/day     Estimated Intake vs Estimated Needs: Intake Meets Needs    Nutrition Risk Level:  Moderate    Nutrition Interventions:   Continue current diet, Continue current ONS  Continued Inpatient Monitoring, Education Not Indicated    Nutrition Evaluation:   · Evaluation: Goal achieved   · Goals: meet % of estimated nutrition needs     · Monitoring: Meal Intake, Supplement Intake, Diet Tolerance, Skin Integrity, Wound Healing, Fluid Balance, Ascites/Edema, Weight, Comparative Standards, Pertinent

## 2018-08-30 NOTE — CARE COORDINATION
Raimundo Trammell from [facility] here to onsite - pt is denied for all [facility] facilities. Pt made aware by liaison Raimundo Trammell. 1110 in to review further SNF choices, pt choices are [facility], [facility], [facility] at [facility]. [phone] call from Cathye Aase at [facility] - accepted and submitted for precert.

## 2018-08-31 VITALS
TEMPERATURE: 97.9 F | DIASTOLIC BLOOD PRESSURE: 65 MMHG | OXYGEN SATURATION: 95 % | WEIGHT: 197.31 LBS | BODY MASS INDEX: 30.97 KG/M2 | RESPIRATION RATE: 16 BRPM | HEIGHT: 67 IN | HEART RATE: 86 BPM | SYSTOLIC BLOOD PRESSURE: 109 MMHG

## 2018-08-31 PROCEDURE — 6370000000 HC RX 637 (ALT 250 FOR IP): Performed by: STUDENT IN AN ORGANIZED HEALTH CARE EDUCATION/TRAINING PROGRAM

## 2018-08-31 PROCEDURE — 6360000002 HC RX W HCPCS: Performed by: INTERNAL MEDICINE

## 2018-08-31 PROCEDURE — 6370000000 HC RX 637 (ALT 250 FOR IP): Performed by: EMERGENCY MEDICINE

## 2018-08-31 PROCEDURE — 99238 HOSP IP/OBS DSCHRG MGMT 30/<: CPT | Performed by: INTERNAL MEDICINE

## 2018-08-31 PROCEDURE — 2580000003 HC RX 258: Performed by: HOSPITALIST

## 2018-08-31 PROCEDURE — 6370000000 HC RX 637 (ALT 250 FOR IP): Performed by: HOSPITALIST

## 2018-08-31 RX ADMIN — ASPIRIN 81 MG: 81 TABLET, CHEWABLE ORAL at 10:11

## 2018-08-31 RX ADMIN — THERA TABS 1 TABLET: TAB at 10:10

## 2018-08-31 RX ADMIN — FOLIC ACID 1 MG: 1 TABLET ORAL at 10:11

## 2018-08-31 RX ADMIN — ENOXAPARIN SODIUM 40 MG: 40 INJECTION SUBCUTANEOUS at 10:11

## 2018-08-31 RX ADMIN — QUETIAPINE FUMARATE 12.5 MG: 25 TABLET ORAL at 07:19

## 2018-08-31 RX ADMIN — BENZONATATE 100 MG: 100 CAPSULE ORAL at 00:28

## 2018-08-31 RX ADMIN — AMOXICILLIN AND CLAVULANATE POTASSIUM 1 TABLET: 875; 125 TABLET, FILM COATED ORAL at 10:10

## 2018-08-31 RX ADMIN — ROPINIROLE HYDROCHLORIDE 1 MG: 1 TABLET, FILM COATED ORAL at 14:06

## 2018-08-31 RX ADMIN — ROPINIROLE HYDROCHLORIDE 1 MG: 1 TABLET, FILM COATED ORAL at 10:09

## 2018-08-31 RX ADMIN — CALCIUM CARBONATE-CHOLECALCIFEROL TAB 250 MG-125 UNIT 250 MG: 250-125 TAB at 10:09

## 2018-08-31 RX ADMIN — CLONAZEPAM 1 MG: 1 TABLET ORAL at 10:09

## 2018-08-31 RX ADMIN — Medication 100 MG: at 10:09

## 2018-08-31 RX ADMIN — PANTOPRAZOLE SODIUM 20 MG: 20 TABLET, DELAYED RELEASE ORAL at 07:19

## 2018-08-31 RX ADMIN — PREGABALIN 150 MG: 75 CAPSULE ORAL at 10:09

## 2018-08-31 RX ADMIN — Medication 10 ML: at 10:13

## 2018-08-31 NOTE — CARE COORDINATION
1898 [address] obtained for [facility]. Patient updated and transportation request for 1700 to accommodate SNF.

## 2018-08-31 NOTE — PROGRESS NOTES
mirtazapine  30 mg Oral Nightly    clonazePAM  1 mg Oral BID    vitamin D  50,000 Units Oral Weekly    pregabalin  150 mg Oral BID    sodium chloride flush  10 mL Intravenous 2 times per day       Diagnostic Labs and Imaging    CBC: Recent Labs      08/30/18   0622   WBC  6.6   RBC  3.94*   HGB  12.4   HCT  39.0   MCV  99.0   RDW  13.7   PLT  222     BMP: Recent Labs      08/29/18   0617  08/30/18   0622   NA  141  141   K  3.4*  3.8   CL  102  106   CO2  27  26   BUN  14  18   CREATININE  0.54  0.45*       Assessment and Plan:     1. Acute encephalopathy with episodes of agitation and combative behavior. Resolved. 2. Aspirations pneumonia. Resolved. Augmentin to continue   3. Acute respirtory failure with hypoxemia and hypercapnia.  Resolved. Patient no more on Oxygen. 4. NSVT, runs of A. fib and pauses.  No further pauses, seen by EP.  No indications for PPM/EPS.  Cardiology signed off  5. Restless leg syndrome.  Continue ropirinol, clonazepam and Lyrica  6. Vitamin D deficiency.  Vitamin D supplements  7. DVT prophylaxis: Lovenox  8. PT/OT eval and treat  9. Patient is to follow-up with neurology in 6-8 weeks. Discharged - Patient will be discharged today. Sheryle Rinks, MD  PGY-1, Department of Internal Medicine  [address] [place] [address]  8/31/2018 1:25 PM      Attending Physician Statement  I have discussed the case, including pertinent history and exam findings with the resident and the team.  I have seen and examined the patient and the key elements of the encounter have been performed by me. I agree with the assessment, plan and orders as documented by the resident. Agitation has resolved. Patient is awake, alert and oriented ×3. Vital signs are stable. Patient can be discharged today considering that she is clinically is stable.       Sara Martin MD, HARSH, [address]  Attending Physician, Internal Medicine Service    Internal Medicine Residency

## 2018-08-31 NOTE — CARE COORDINATION
Discharge [facility] Case Management Department  Written by: Jordana Sweet RN    Patient Name: Dipesh Griffith  Attending Provider: Najma Babin MD  Admit Date: 2018 10:22 AM  MRN: [id number]  Account: [de-identified]                     : 1955  Discharge Date: 2018      Disposition: [facility] via [address] [facility]. HENS completed and in dc packet with AVS and signed Rx. Yessica Forrest RN to call report. Transport scheduled for 1700.     Jordana Sweet RN

## 2018-09-05 NOTE — DISCHARGE SUMMARY
[address] [place] [address]     Department of Internal Medicine - Staff Internal Medicine Service    INPATIENT DISCHARGE SUMMARY        Patient Identification:  Claudeen Kallman is a 66 y.o. female. :  1955  MRN: [id number]     Acct: [de-identified]   Admit Date:  2018  Discharge date and time: 2018  5:40 PM   Attending Provider: No att. providers found                                     Admission Diagnoses:   Encephalopathy [G93.40]    Discharge Diagnoses:   Principal Problem:    Encephalopathy acute  Active Problems:    Hyperglycemia    Hypokalemia    KATELYNN (acute kidney injury) ([address]    Dehydration    NSVT (nonsustained ventricular tachycardia) (HCC)    Depression    Aspiration pneumonia (HCC)    Non-traumatic rhabdomyolysis    Acute metabolic encephalopathy    Unresponsive    Arterial hypotension    Hypoxic ischemic encephalopathy    Hypernatremia    Vitamin D deficiency    Anxiety  Resolved Problems:    Hypovolemic shock ([address]       Consults:   Cardiology, neurology, GI    Brief Inpatient course: The patient was admitted with an Neuropathy associated with acute respiratory failure with hypoxemia and hypercapnia. She was found to have shock secondary to GI bleed and possibly due to sepsis due to pneumonia, secondary to aspiration. She was admitted to the ICU with toxic metabolic gastropathy in the setting of pneumonia, hyperammonemia and hypercapnic respiratory failure. The patient developed hyponatremia, hypokalemia and acute kidney injury due to dehydration. The patient was in ventricular tachycardia and was put on amiodarone. The next day the amiodarone was stopped. The patient was agitated and moaning constantly because of restless leg and pain. Home medications were resumed. On workup no iron deficiency was found. There was evidence of mild vitamin D deficiency. Patient's mentation got better. The patient was found to have sinus pauses of 2.5-3 seconds with atrial fibrillation.

## 2018-09-11 ENCOUNTER — OFFICE VISIT (OUTPATIENT)
Dept: FAMILY MEDICINE CLINIC | Age: 63
End: 2018-09-11
Payer: MEDICARE

## 2018-09-11 VITALS
BODY MASS INDEX: 31.32 KG/M2 | RESPIRATION RATE: 16 BRPM | DIASTOLIC BLOOD PRESSURE: 60 MMHG | OXYGEN SATURATION: 95 % | HEART RATE: 87 BPM | WEIGHT: 200 LBS | TEMPERATURE: 98.5 F | SYSTOLIC BLOOD PRESSURE: 110 MMHG

## 2018-09-11 DIAGNOSIS — Z09 HOSPITAL DISCHARGE FOLLOW-UP: ICD-10-CM

## 2018-09-11 DIAGNOSIS — K92.0 COFFEE GROUND EMESIS: ICD-10-CM

## 2018-09-11 DIAGNOSIS — I47.29 NSVT (NONSUSTAINED VENTRICULAR TACHYCARDIA): ICD-10-CM

## 2018-09-11 DIAGNOSIS — J69.0 ASPIRATION PNEUMONIA, UNSPECIFIED ASPIRATION PNEUMONIA TYPE, UNSPECIFIED LATERALITY, UNSPECIFIED PART OF LUNG (HCC): Primary | ICD-10-CM

## 2018-09-11 DIAGNOSIS — B37.2 CANDIDAL DERMATITIS: ICD-10-CM

## 2018-09-11 DIAGNOSIS — N17.9 AKI (ACUTE KIDNEY INJURY) (HCC): ICD-10-CM

## 2018-09-11 PROCEDURE — G8417 CALC BMI ABV UP PARAM F/U: HCPCS | Performed by: NURSE PRACTITIONER

## 2018-09-11 PROCEDURE — 1111F DSCHRG MED/CURRENT MED MERGE: CPT | Performed by: NURSE PRACTITIONER

## 2018-09-11 PROCEDURE — 3017F COLORECTAL CA SCREEN DOC REV: CPT | Performed by: NURSE PRACTITIONER

## 2018-09-11 PROCEDURE — 4004F PT TOBACCO SCREEN RCVD TLK: CPT | Performed by: NURSE PRACTITIONER

## 2018-09-11 PROCEDURE — 99215 OFFICE O/P EST HI 40 MIN: CPT | Performed by: NURSE PRACTITIONER

## 2018-09-11 PROCEDURE — G8427 DOCREV CUR MEDS BY ELIG CLIN: HCPCS | Performed by: NURSE PRACTITIONER

## 2018-09-11 RX ORDER — NYSTATIN AND TRIAMCINOLONE ACETONIDE 100000; 1 [USP'U]/G; MG/G
OINTMENT TOPICAL
Qty: 30 G | Refills: 0 | Status: SHIPPED | OUTPATIENT
Start: 2018-09-11 | End: 2019-02-13 | Stop reason: ALTCHOICE

## 2018-09-11 ASSESSMENT — ENCOUNTER SYMPTOMS
VOMITING: 0
EYES NEGATIVE: 1
NAUSEA: 0
ABDOMINAL PAIN: 0
ALLERGIC/IMMUNOLOGIC NEGATIVE: 1
COLOR CHANGE: 0
DIARRHEA: 0
RESPIRATORY NEGATIVE: 1
BLOOD IN STOOL: 0
ANAL BLEEDING: 0
CONSTIPATION: 0
WHEEZING: 0
SHORTNESS OF BREATH: 0
CHEST TIGHTNESS: 0

## 2018-09-11 NOTE — LETTER
[facility] [address] [address]  Phone: [phone]  Fax: [phone]    DESMOND Oneill CNP        September 11, 2018     Patient: Ngozi Mcclendon   YOB: 1955   Date of Visit: 9/11/2018       To Whom It May Concern: It is my medical opinion that Ngozi Mcclendon may return to full duty immediately with no restrictions starting 9/18/18. If you have any questions or concerns, please don't hesitate to call.     Sincerely,        DESMOND Oneill CNP
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
## 2018-09-20 PROBLEM — E86.0 DEHYDRATION: Status: RESOLVED | Noted: 2018-08-21 | Resolved: 2018-09-20

## 2018-09-26 ENCOUNTER — HOSPITAL ENCOUNTER (OUTPATIENT)
Age: 63
Setting detail: SPECIMEN
Discharge: HOME | End: 2018-09-26
Payer: MEDICARE

## 2018-09-26 DIAGNOSIS — K92.0 COFFEE GROUND EMESIS: ICD-10-CM

## 2018-09-26 DIAGNOSIS — N17.9 AKI (ACUTE KIDNEY INJURY) (HCC): ICD-10-CM

## 2018-09-26 LAB
ABSOLUTE EOS #: 0.24 K/UL (ref 0–0.44)
ABSOLUTE IMMATURE GRANULOCYTE: <0.03 K/UL (ref 0–0.3)
ABSOLUTE LYMPH #: 2.25 K/UL (ref 1.1–3.7)
ABSOLUTE MONO #: 0.47 K/UL (ref 0.1–1.2)
ANION GAP SERPL CALCULATED.3IONS-SCNC: 16 MMOL/L (ref 9–17)
BASOPHILS # BLD: 1 % (ref 0–2)
BASOPHILS ABSOLUTE: 0.04 K/UL (ref 0–0.2)
BUN BLDV-MCNC: 6 MG/DL (ref 8–23)
BUN/CREAT BLD: ABNORMAL (ref 9–20)
CALCIUM SERPL-MCNC: 9.9 MG/DL (ref 8.6–10.4)
CHLORIDE BLD-SCNC: 100 MMOL/L (ref 98–107)
CO2: 25 MMOL/L (ref 20–31)
CREAT SERPL-MCNC: 0.64 MG/DL (ref 0.5–0.9)
DIFFERENTIAL TYPE: ABNORMAL
EOSINOPHILS RELATIVE PERCENT: 4 % (ref 1–4)
GFR AFRICAN AMERICAN: >60 ML/MIN
GFR NON-AFRICAN AMERICAN: >60 ML/MIN
GFR SERPL CREATININE-BSD FRML MDRD: ABNORMAL ML/MIN/{1.73_M2}
GFR SERPL CREATININE-BSD FRML MDRD: ABNORMAL ML/MIN/{1.73_M2}
GLUCOSE BLD-MCNC: 94 MG/DL (ref 70–99)
HCT VFR BLD CALC: 48.5 % (ref 36.3–47.1)
HEMOGLOBIN: 15.1 G/DL (ref 11.9–15.1)
IMMATURE GRANULOCYTES: 0 %
LYMPHOCYTES # BLD: 35 % (ref 24–43)
MCH RBC QN AUTO: 30.6 PG (ref 25.2–33.5)
MCHC RBC AUTO-ENTMCNC: 31.1 G/DL (ref 28.4–34.8)
MCV RBC AUTO: 98.4 FL (ref 82.6–102.9)
MONOCYTES # BLD: 7 % (ref 3–12)
NRBC AUTOMATED: 0 PER 100 WBC
PDW BLD-RTO: 14.3 % (ref 11.8–14.4)
PLATELET # BLD: 260 K/UL (ref 138–453)
PLATELET ESTIMATE: ABNORMAL
PMV BLD AUTO: 11 FL (ref 8.1–13.5)
POTASSIUM SERPL-SCNC: 4 MMOL/L (ref 3.7–5.3)
RBC # BLD: 4.93 M/UL (ref 3.95–5.11)
RBC # BLD: ABNORMAL 10*6/UL
SEG NEUTROPHILS: 53 % (ref 36–65)
SEGMENTED NEUTROPHILS ABSOLUTE COUNT: 3.5 K/UL (ref 1.5–8.1)
SODIUM BLD-SCNC: 141 MMOL/L (ref 135–144)
WBC # BLD: 6.5 K/UL (ref 3.5–11.3)
WBC # BLD: ABNORMAL 10*3/UL

## 2018-10-03 ENCOUNTER — HOSPITAL ENCOUNTER (OUTPATIENT)
Dept: GENERAL RADIOLOGY | Age: 63
Discharge: HOME | End: 2018-10-05
Payer: MEDICARE

## 2018-10-03 ENCOUNTER — HOSPITAL ENCOUNTER (OUTPATIENT)
Age: 63
Discharge: HOME | End: 2018-10-05
Payer: MEDICARE

## 2018-10-03 DIAGNOSIS — R93.89 ABNORMAL CHEST X-RAY: Primary | ICD-10-CM

## 2018-10-03 DIAGNOSIS — J69.0 ASPIRATION PNEUMONIA, UNSPECIFIED ASPIRATION PNEUMONIA TYPE, UNSPECIFIED LATERALITY, UNSPECIFIED PART OF LUNG (HCC): ICD-10-CM

## 2018-10-03 PROCEDURE — 71046 X-RAY EXAM CHEST 2 VIEWS: CPT

## 2018-10-12 ENCOUNTER — OFFICE VISIT (OUTPATIENT)
Dept: FAMILY MEDICINE CLINIC | Age: 63
End: 2018-10-12
Payer: MEDICARE

## 2018-10-12 VITALS
SYSTOLIC BLOOD PRESSURE: 118 MMHG | WEIGHT: 188.6 LBS | HEART RATE: 91 BPM | OXYGEN SATURATION: 93 % | BODY MASS INDEX: 29.54 KG/M2 | DIASTOLIC BLOOD PRESSURE: 78 MMHG
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
## 2018-10-12 DIAGNOSIS — E04.1 THYROID NODULE: ICD-10-CM

## 2018-10-12 DIAGNOSIS — R93.89 ABNORMAL CHEST X-RAY: ICD-10-CM

## 2018-10-12 PROCEDURE — G8484 FLU IMMUNIZE NO ADMIN: HCPCS | Performed by: FAMILY MEDICINE

## 2018-10-12 PROCEDURE — G8427 DOCREV CUR MEDS BY ELIG CLIN: HCPCS | Performed by: FAMILY MEDICINE

## 2018-10-12 PROCEDURE — 3017F COLORECTAL CA SCREEN DOC REV: CPT | Performed by: FAMILY MEDICINE

## 2018-10-12 PROCEDURE — 99213 OFFICE O/P EST LOW 20 MIN: CPT | Performed by: FAMILY MEDICINE

## 2018-10-12 PROCEDURE — 4004F PT TOBACCO SCREEN RCVD TLK: CPT | Performed by: FAMILY MEDICINE

## 2018-10-12 PROCEDURE — G8417 CALC BMI ABV UP PARAM F/U: HCPCS | Performed by: FAMILY MEDICINE

## 2018-10-12 RX ORDER — CLINDAMYCIN HYDROCHLORIDE 300 MG/1
300 CAPSULE ORAL
COMMUNITY
Start: 2018-10-06 | End: 2018-10-13

## 2018-10-12 RX ORDER — NAPROXEN 500 MG/1
500 TABLET ORAL 2 TIMES DAILY PRN
COMMUNITY
Start: 2018-10-06 | End: 2019-03-08 | Stop reason: ALTCHOICE
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
## 2018-10-17 ENCOUNTER — HOSPITAL ENCOUNTER (OUTPATIENT)
Dept: CT IMAGING | Age: 63
Discharge: HOME | End: 2018-10-19
Payer: MEDICARE

## 2018-10-17 DIAGNOSIS — R93.89 ABNORMAL CHEST X-RAY: ICD-10-CM

## 2018-10-17 PROCEDURE — 6360000004 HC RX CONTRAST MEDICATION: Performed by: NURSE PRACTITIONER

## 2018-10-17 PROCEDURE — 2580000003 HC RX 258: Performed by: NURSE PRACTITIONER

## 2018-10-17 PROCEDURE — 71260 CT THORAX DX C+: CPT

## 2018-10-17 RX ORDER — SODIUM CHLORIDE 0.9 % (FLUSH) 0.9 %
10 SYRINGE (ML) INJECTION
Status: COMPLETED | OUTPATIENT
Start: 2018-10-17 | End: 2018-10-17

## 2018-10-17 RX ORDER — 0.9 % SODIUM CHLORIDE 0.9 %
80 INTRAVENOUS SOLUTION INTRAVENOUS ONCE
Status: COMPLETED | OUTPATIENT
Start: 2018-10-17 | End: 2018-10-17

## 2018-10-17 RX ADMIN — IOPAMIDOL 75 ML: 755 INJECTION, SOLUTION INTRAVENOUS at 09:38

## 2018-10-17 RX ADMIN — Medication 10 ML: at 09:38

## 2018-10-17 RX ADMIN — SODIUM CHLORIDE 80 ML: 9 INJECTION, SOLUTION INTRAVENOUS at 09:38

## 2018-10-29 RX ORDER — MIRTAZAPINE 30 MG/1
30 TABLET, FILM COATED ORAL NIGHTLY
Qty: 90 TABLET | Refills: 1 | Status: SHIPPED | OUTPATIENT
Start: 2018-10-29 | End: 2019-05-13 | Stop reason: SDUPTHER

## 2018-11-07 ENCOUNTER — HOSPITAL ENCOUNTER (OUTPATIENT)
Dept: ULTRASOUND IMAGING | Age: 63
Discharge: HOME | End: 2018-11-09
Payer: MEDICARE

## 2018-11-07 DIAGNOSIS — E04.1 THYROID NODULE: ICD-10-CM

## 2018-11-07 PROCEDURE — 76536 US EXAM OF HEAD AND NECK: CPT

## 2018-11-08 ENCOUNTER — OFFICE VISIT (OUTPATIENT)
Dept: FAMILY MEDICINE CLINIC | Age: 63
End: 2018-11-08
Payer: MEDICARE

## 2018-11-08 VITALS
WEIGHT: 187.8 LBS | BODY MASS INDEX: 29.41 KG/M2 | DIASTOLIC BLOOD PRESSURE: 76 MMHG | HEART RATE: 76 BPM | SYSTOLIC BLOOD PRESSURE: 102 MMHG

## 2018-11-08 DIAGNOSIS — E04.2 MULTIPLE THYROID NODULES: ICD-10-CM
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
## 2018-11-08 DIAGNOSIS — S70.12XD HEMATOMA OF LEFT THIGH, SUBSEQUENT ENCOUNTER: ICD-10-CM

## 2018-11-08 DIAGNOSIS — M25.532 LEFT WRIST PAIN: Primary | ICD-10-CM

## 2018-11-08 DIAGNOSIS — M54.5 LOW BACK PAIN, UNSPECIFIED BACK PAIN LATERALITY, UNSPECIFIED CHRONICITY, WITH SCIATICA PRESENCE UNSPECIFIED: ICD-10-CM

## 2018-11-08 DIAGNOSIS — M77.8 LEFT WRIST TENDONITIS: ICD-10-CM

## 2018-11-08 PROCEDURE — G8427 DOCREV CUR MEDS BY ELIG CLIN: HCPCS | Performed by: FAMILY MEDICINE

## 2018-11-08 PROCEDURE — G8484 FLU IMMUNIZE NO ADMIN: HCPCS | Performed by: FAMILY MEDICINE

## 2018-11-08 PROCEDURE — G8417 CALC BMI ABV UP PARAM F/U: HCPCS | Performed by: FAMILY MEDICINE

## 2018-11-08 PROCEDURE — 4004F PT TOBACCO SCREEN RCVD TLK: CPT | Performed by: FAMILY MEDICINE

## 2018-11-08 PROCEDURE — 3017F COLORECTAL CA SCREEN DOC REV: CPT | Performed by: FAMILY MEDICINE

## 2018-11-08 PROCEDURE — 99213 OFFICE O/P EST LOW 20 MIN: CPT | Performed by: FAMILY MEDICINE

## 2018-11-08 NOTE — PROGRESS NOTES
Visit Information    Have you changed or started any medications since your last visit including any over-the-counter medicines, vitamins, or herbal medicines? no   Are you having any side effects from any of your medications? -  no  Have you stopped taking any of your medications? Is so, why? -  no    Have you seen any other physician or provider since your last visit? No  Have you had any other diagnostic tests since your last visit? No  Have you been seen in the emergency room and/or had an admission to a hospital since we last saw you? No  Have you had your routine dental cleaning in the past 6 months? yes -     Have you activated your [employer] account? If not, what are your barriers?  No:      Patient Care Team:  Marcel Ibanez DO as PCP - General (Family Medicine)  Marcel Ibanez DO as PCP - [facility] Attributed Provider  Marcel Ibanez DO (Family Medicine)    Medical History Review  Past Medical, Family, and Social History reviewed and  contribute to the patient presenting condition    Health Maintenance   Topic Date Due    Hepatitis C screen  1955    HIV screen  02/12/1972    DTaP/Tdap/Td vaccine (1 - Tdap) 02/12/1976    Cervical cancer screen  02/12/1978    Shingles Vaccine (1 of 2 - 2 Dose Series) 02/12/2007    Breast cancer screen  03/05/2014    Lipid screen  03/21/2017    Colon Cancer Screen FIT/FOBT  07/30/2019    Flu vaccine  Completed    Pneumococcal med risk  Completed

## 2018-11-12 ENCOUNTER — TELEPHONE (OUTPATIENT)
Dept: FAMILY MEDICINE CLINIC | Age: 63
End: 2018-11-12

## 2018-11-12 ENCOUNTER — HOSPITAL ENCOUNTER (OUTPATIENT)
Age: 63
Setting detail: SPECIMEN
Discharge: HOME | End: 2018-11-12
Payer: MEDICARE

## 2018-11-12 DIAGNOSIS — E04.2 MULTIPLE THYROID NODULES: ICD-10-CM

## 2018-11-12 DIAGNOSIS — E04.1 THYROID NODULE: Primary | ICD-10-CM

## 2018-11-12 LAB — TSH SERPL DL<=0.05 MIU/L-ACNC: 1.28 MIU/L (ref 0.3–5)

## 2018-11-13 LAB
THYROGLOBULIN AB: <20 IU/ML (ref 0–40)
THYROID PEROXIDASE (TPO) AB: 14 IU/ML (ref 0–35)

## 2019-01-15 RX ORDER — TRAZODONE HYDROCHLORIDE 50 MG/1
50 TABLET ORAL NIGHTLY
Qty: 30 TABLET | Refills: 11 | Status: SHIPPED | OUTPATIENT
Start: 2019-01-15 | End: 2019-09-25

## 2019-02-13 ENCOUNTER — HOSPITAL ENCOUNTER (OUTPATIENT)
Age: 64
Setting detail: SPECIMEN
Discharge: HOME | End: 2019-02-13
Payer: MEDICARE

## 2019-02-13 ENCOUNTER — OFFICE VISIT (OUTPATIENT)
Dept: FAMILY MEDICINE CLINIC | Age: 64
End: 2019-02-13
Payer: MEDICARE

## 2019-02-13 VITALS
WEIGHT: 195 LBS | SYSTOLIC BLOOD PRESSURE: 102 MMHG | OXYGEN SATURATION: 96 % | DIASTOLIC BLOOD PRESSURE: 78 MMHG | RESPIRATION RATE: 18 BRPM | HEART RATE: 89 BPM | BODY MASS INDEX: 30.54 KG/M2

## 2019-02-13 DIAGNOSIS — R39.15 URINARY URGENCY: ICD-10-CM

## 2019-02-13 DIAGNOSIS — R19.7 DIARRHEA, UNSPECIFIED TYPE: ICD-10-CM

## 2019-02-13 DIAGNOSIS — R53.83 FATIGUE, UNSPECIFIED TYPE: ICD-10-CM

## 2019-02-13 DIAGNOSIS — K52.9 ENTERITIS: Primary | ICD-10-CM

## 2019-02-13 DIAGNOSIS — Z09 HOSPITAL DISCHARGE FOLLOW-UP: ICD-10-CM

## 2019-02-13 LAB
BILIRUBIN, POC: NORMAL
BLOOD URINE, POC: NORMAL
CLARITY, POC: CLEAR
COLOR, POC: YELLOW
GLUCOSE URINE, POC: NORMAL
KETONES, POC: NORMAL
LEUKOCYTE EST, POC: NORMAL
NITRITE, POC: NORMAL
PH, POC: 7.5
PROTEIN, POC: NORMAL
SPECIFIC GRAVITY, POC: 1.02
UROBILINOGEN, POC: 0.2

## 2019-02-13 PROCEDURE — 4004F PT TOBACCO SCREEN RCVD TLK: CPT | Performed by: NURSE PRACTITIONER

## 2019-02-13 PROCEDURE — 81003 URINALYSIS AUTO W/O SCOPE: CPT | Performed by: NURSE PRACTITIONER

## 2019-02-13 PROCEDURE — G8484 FLU IMMUNIZE NO ADMIN: HCPCS | Performed by: NURSE PRACTITIONER

## 2019-02-13 PROCEDURE — 3017F COLORECTAL CA SCREEN DOC REV: CPT | Performed by: NURSE PRACTITIONER

## 2019-02-13 PROCEDURE — G8427 DOCREV CUR MEDS BY ELIG CLIN: HCPCS | Performed by: NURSE PRACTITIONER

## 2019-02-13 PROCEDURE — G8417 CALC BMI ABV UP PARAM F/U: HCPCS | Performed by: NURSE PRACTITIONER

## 2019-02-13 PROCEDURE — 99215 OFFICE O/P EST HI 40 MIN: CPT | Performed by: NURSE PRACTITIONER

## 2019-02-13 ASSESSMENT — ENCOUNTER SYMPTOMS
ABDOMINAL PAIN: 0
ANAL BLEEDING: 0
CONSTIPATION: 0
COLOR CHANGE: 0
RESPIRATORY NEGATIVE: 1
VOMITING: 0
ALLERGIC/IMMUNOLOGIC NEGATIVE: 1
BLOOD IN STOOL: 0
EYES NEGATIVE: 1
DIARRHEA: 1
ABDOMINAL DISTENTION: 0
NAUSEA: 0

## 2019-02-14 LAB
CULTURE: NORMAL
Lab: NORMAL
SPECIMEN DESCRIPTION: NORMAL

## 2019-03-08 ENCOUNTER — OFFICE VISIT (OUTPATIENT)
Dept: FAMILY MEDICINE CLINIC | Age: 64
End: 2019-03-08
Payer: MEDICARE

## 2019-03-08 VITALS
TEMPERATURE: 97.8 F | SYSTOLIC BLOOD PRESSURE: 134 MMHG | BODY MASS INDEX: 29.76 KG/M2 | WEIGHT: 190 LBS | DIASTOLIC BLOOD PRESSURE: 86 MMHG

## 2019-03-08 DIAGNOSIS — G43.009 MIGRAINE WITHOUT AURA AND WITHOUT STATUS MIGRAINOSUS, NOT INTRACTABLE: ICD-10-CM

## 2019-03-08 DIAGNOSIS — R51.9 NEW ONSET OF HEADACHES AFTER AGE 50: Primary | ICD-10-CM

## 2019-03-08 PROCEDURE — 4004F PT TOBACCO SCREEN RCVD TLK: CPT | Performed by: NURSE PRACTITIONER

## 2019-03-08 PROCEDURE — G8484 FLU IMMUNIZE NO ADMIN: HCPCS | Performed by: NURSE PRACTITIONER

## 2019-03-08 PROCEDURE — G8427 DOCREV CUR MEDS BY ELIG CLIN: HCPCS | Performed by: NURSE PRACTITIONER

## 2019-03-08 PROCEDURE — 99214 OFFICE O/P EST MOD 30 MIN: CPT | Performed by: NURSE PRACTITIONER

## 2019-03-08 PROCEDURE — 3017F COLORECTAL CA SCREEN DOC REV: CPT | Performed by: NURSE PRACTITIONER

## 2019-03-08 PROCEDURE — G8417 CALC BMI ABV UP PARAM F/U: HCPCS | Performed by: NURSE PRACTITIONER

## 2019-03-08 RX ORDER — CLONAZEPAM 1 MG/1
1 TABLET ORAL
COMMUNITY
Start: 2017-06-14 | End: 2019-03-08 | Stop reason: ALTCHOICE

## 2019-03-08 RX ORDER — PANTOPRAZOLE SODIUM 20 MG/1
20 TABLET, DELAYED RELEASE ORAL
COMMUNITY
Start: 2018-10-04 | End: 2019-03-08 | Stop reason: ALTCHOICE

## 2019-03-08 RX ORDER — TRAZODONE HYDROCHLORIDE 50 MG/1
50 TABLET ORAL
COMMUNITY
Start: 2018-10-04 | End: 2019-03-08 | Stop reason: SDUPTHER

## 2019-03-08 RX ORDER — SUMATRIPTAN 50 MG/1
50 TABLET, FILM COATED ORAL
Qty: 9 TABLET | Refills: 0 | Status: SHIPPED | OUTPATIENT
Start: 2019-03-08 | End: 2019-10-04 | Stop reason: ALTCHOICE

## 2019-03-08 ASSESSMENT — ENCOUNTER SYMPTOMS
COUGH: 0
EYE ITCHING: 0
EYE PAIN: 0
BLURRED VISION: 0
SWOLLEN GLANDS: 0
VISUAL CHANGE: 0
RESPIRATORY NEGATIVE: 1
NAUSEA: 1
SINUS PAIN: 0
COLOR CHANGE: 0
SINUS PRESSURE: 0
FACIAL SWEATING: 0
RHINORRHEA: 0
EYE WATERING: 0
EYE DISCHARGE: 0
EYE REDNESS: 0
PHOTOPHOBIA: 1
SORE THROAT: 0
ABDOMINAL PAIN: 0
SCALP TENDERNESS: 0
VOMITING: 0

## 2019-03-25 ENCOUNTER — OFFICE VISIT (OUTPATIENT)
Dept: FAMILY MEDICINE CLINIC | Age: 64
End: 2019-03-25
Payer: MEDICARE

## 2019-03-25 VITALS
WEIGHT: 190.4 LBS | HEART RATE: 76 BPM | DIASTOLIC BLOOD PRESSURE: 76 MMHG | SYSTOLIC BLOOD PRESSURE: 132 MMHG | OXYGEN SATURATION: 98 % | BODY MASS INDEX: 29.82 KG/M2

## 2019-03-25 DIAGNOSIS — E72.20 HYPERAMMONEMIA (HCC): ICD-10-CM

## 2019-03-25 DIAGNOSIS — Z00.00 ROUTINE PHYSICAL EXAMINATION: Primary | ICD-10-CM

## 2019-03-25 PROCEDURE — 86580 TB INTRADERMAL TEST: CPT | Performed by: FAMILY MEDICINE

## 2019-03-25 PROCEDURE — 99396 PREV VISIT EST AGE 40-64: CPT | Performed by: FAMILY MEDICINE

## 2019-03-25 PROCEDURE — G8484 FLU IMMUNIZE NO ADMIN: HCPCS | Performed by: FAMILY MEDICINE

## 2019-03-27 LAB
INDURATION: NORMAL
TB SKIN TEST: NORMAL

## 2019-04-03 ENCOUNTER — TELEPHONE (OUTPATIENT)
Dept: FAMILY MEDICINE CLINIC | Age: 64
End: 2019-04-03

## 2019-04-03 DIAGNOSIS — B37.2 CANDIDAL DERMATITIS: ICD-10-CM

## 2019-04-03 RX ORDER — NYSTATIN AND TRIAMCINOLONE ACETONIDE 100000; 1 [USP'U]/G; MG/G
OINTMENT TOPICAL
Qty: 30 G | Refills: 0 | Status: CANCELLED | OUTPATIENT
Start: 2019-04-03

## 2019-04-03 RX ORDER — NYSTATIN AND TRIAMCINOLONE ACETONIDE 100000; 1 [USP'U]/G; MG/G
OINTMENT TOPICAL
Qty: 30 G | Refills: 0 | Status: SHIPPED | OUTPATIENT
Start: 2019-04-03 | End: 2019-06-27

## 2019-04-03 NOTE — TELEPHONE ENCOUNTER
Is the irritation in her groin or her vaginal area?  I ask because it will change which medication I send her

## 2019-05-17 ENCOUNTER — TELEPHONE (OUTPATIENT)
Dept: GASTROENTEROLOGY | Age: 64
End: 2019-05-17

## 2019-06-10 ENCOUNTER — TELEPHONE (OUTPATIENT)
Dept: FAMILY MEDICINE CLINIC | Age: 64
End: 2019-06-10

## 2019-06-27 ENCOUNTER — OFFICE VISIT (OUTPATIENT)
Dept: FAMILY MEDICINE CLINIC | Age: 64
End: 2019-06-27
Payer: MEDICARE

## 2019-06-27 VITALS
HEART RATE: 84 BPM | SYSTOLIC BLOOD PRESSURE: 124 MMHG | TEMPERATURE: 97.8 F | DIASTOLIC BLOOD PRESSURE: 84 MMHG | OXYGEN SATURATION: 98 %

## 2019-06-27 DIAGNOSIS — H18.822 CORNEAL ABRASION OF LEFT EYE DUE TO CONTACT LENS: Primary | ICD-10-CM

## 2019-06-27 PROCEDURE — 1036F TOBACCO NON-USER: CPT | Performed by: NURSE PRACTITIONER

## 2019-06-27 PROCEDURE — G8417 CALC BMI ABV UP PARAM F/U: HCPCS | Performed by: NURSE PRACTITIONER

## 2019-06-27 PROCEDURE — 99213 OFFICE O/P EST LOW 20 MIN: CPT | Performed by: NURSE PRACTITIONER

## 2019-06-27 PROCEDURE — 3017F COLORECTAL CA SCREEN DOC REV: CPT | Performed by: NURSE PRACTITIONER

## 2019-06-27 PROCEDURE — G8427 DOCREV CUR MEDS BY ELIG CLIN: HCPCS | Performed by: NURSE PRACTITIONER

## 2019-06-27 RX ORDER — CIPROFLOXACIN HYDROCHLORIDE 3.5 MG/ML
SOLUTION/ DROPS TOPICAL
Qty: 120 DROP | Refills: 0 | Status: SHIPPED | OUTPATIENT
Start: 2019-06-27 | End: 2019-09-25

## 2019-06-27 ASSESSMENT — ENCOUNTER SYMPTOMS
WHEEZING: 0
COUGH: 0
EYE DISCHARGE: 1
EYE ITCHING: 0
PHOTOPHOBIA: 1
EYE PAIN: 1
SHORTNESS OF BREATH: 0
RESPIRATORY NEGATIVE: 1
EYE REDNESS: 1
CHEST TIGHTNESS: 0

## 2019-06-27 NOTE — PATIENT INSTRUCTIONS
Patient Education        Corneal Scratches: Care Instructions  Your Care Instructions    The cornea is the clear surface that covers the front of the eye. When a speck of dirt, a wood chip, an insect, or another object flies into your eye, it can cause a painful scratch on the cornea. Wearing contact lenses too long or rubbing your eyes can also scratch the cornea. Small scratches usually heal in a day or two. Deeper scratches may take longer. If you have had a foreign object removed from your eye or you have a corneal scratch, you will need to watch for infection and vision problems while your eye heals. Follow-up care is a key part of your treatment and safety. Be sure to make and go to all appointments, and call your doctor if you are having problems. It's also a good idea to know your test results and keep a list of the medicines you take. How can you care for yourself at home? · The doctor probably used a medicine during your exam to numb your eye. When it wears off in 30 to 60 minutes, your eye pain may come back. Take pain medicines exactly as directed. ? If the doctor gave you a prescription medicine for pain, take it as prescribed. ? If you are not taking a prescription pain medicine, ask your doctor if you can take an over-the-counter medicine. ? Do not take two or more pain medicines at the same time unless the doctor told you to. Many pain medicines have acetaminophen, which is Tylenol. Too much acetaminophen (Tylenol) can be harmful. · Do not rub your injured eye. Rubbing can make it worse. · Use the prescribed eyedrops or ointment as directed. Be sure the dropper or bottle tip is clean. To put in eyedrops or ointment:  ? Tilt your head back, and pull your lower eyelid down with one finger. ? Drop or squirt the medicine inside the lower lid. ? Close your eye for 30 to 60 seconds to let the drops or ointment move around.   ? Do not touch the ointment or dropper tip to your eyelashes or any

## 2019-06-27 NOTE — PROGRESS NOTES
[address] [facility] [address]  Dept: [phone]  Dept Fax: [phone]     Bessy Harvey is a 60 y.o. female who presents to the urgent care today for her medicalconditions/complaints as noted below. Bessy Harvey is c/o of Eye Problem (lt eye irritation, burning and had ha behind eye - had contact stuck in eye but finally got it out yesterday)    HPI:      Eye Problem    The left eye is affected. This is a new problem. Episode onset: last Friday. The problem has been unchanged. The injury mechanism was contact lenses (was unable to remove-- was able to remove the contact last night). There is no known exposure to pink eye. She wears contacts. Associated symptoms include an eye discharge (watery, mild), eye redness (burning, irritated) and photophobia. Pertinent negatives include no fever or itching. Treatments tried: eye gtts for red eye. The treatment provided no relief. Sees an eye doctor at [facility]. Past Medical History:   Diagnosis Date    KATELYNN (acute [facility]) ([facility]     Anxiety     Degenerative disc disease, lumbar 05/2018    Depression     Frequent UTI     Infertility, female     Osteoarthritis     Restless leg syndrome     Thyroid nodule, Rt. Lobe. 27 mm       Current Outpatient Medications   Medication Sig Dispense Refill    LYRICA 150 MG capsule Take 1 capsule by mouth 2 times daily. 5    ciprofloxacin (CILOXAN) 0.3 % ophthalmic solution 2 gtt every 15 min for the first 6 hr, then 2 gtt every 30 min for the rest day 1. Day 2, 2 gtt q1h.  Days 3-14, 2 drops q4h 120 drop 0    mirtazapine (REMERON) 30 MG tablet TAKE 1 TABLET BY MOUTH NIGHTLY 90 tablet 3    aspirin 81 MG tablet Take 81 mg by mouth      traZODone (DESYREL) 50 MG tablet Take 1 tablet by mouth nightly 30 tablet 11    rOPINIRole (REQUIP) 1 MG tablet Take 1 tablet by mouth 4 times daily 90 tablet 3    Multiple Vitamin (MULTIVITAMIN) tablet Take 1 tablet by mouth daily 30 tablet 3    SUMAtriptan (IMITREX) 50 MG tablet Take 1 tablet by mouth once as needed for Migraine 9 tablet 0     No current facility-administered medications for this visit. Allergies   Allergen Reactions    Pcn [Penicillins] Other (See Comments)     \" as a child\"      Neomycin Rash    Penicillin G Rash     Reviewed PMH, SH, and  with the patient and updated. Subjective:      Review of Systems   Constitutional: Negative for chills, fatigue and fever. HENT: Negative. Eyes: Positive for photophobia, pain (left), discharge (watery, mild) and redness (burning, irritated). Negative for itching and visual disturbance. Respiratory: Negative. Negative for cough, chest tightness, shortness of breath and wheezing. Cardiovascular: Negative. Negative for chest pain. Skin: Negative for rash. All other systems reviewed and are negative. Objective:      Physical Exam   Constitutional: She appears well-developed. No distress. HENT:   Head: Normocephalic and atraumatic. Eyes: Pupils are equal, round, and reactive to light. EOM are normal. Right eye exhibits no discharge. Left eye exhibits no discharge and no exudate. Right conjunctiva is not injected. Left conjunctiva is injected. Left eye exhibits normal extraocular motion. Left pupil is round and reactive. Slit lamp exam:       The left eye shows corneal abrasion. Cardiovascular: Normal rate, regular rhythm and normal heart sounds. No murmur heard. Pulmonary/Chest: Effort normal and breath sounds normal. No respiratory distress. She has no wheezes. Neurological: She is alert. Skin: Skin is warm and dry. She is not diaphoretic. Nursing note and vitals reviewed. /84 (Site: Right Upper Arm, Position: Sitting, Cuff Size: Medium Adult)   Pulse 84   Temp 97.8 °F (36.6 °C) (Oral)   SpO2 98%     Assessment:       Diagnosis Orders   1.  Corneal abrasion of left eye due to contact lens  ciprofloxacin (CILOXAN) 0.3 % ophthalmic solution     Plan:      Exam with the wood's lamp revealed a corneal abrasion. I recommend that we start an antibiotic eye drop at this time to prevent secondary infection. Cool compresses if desired. Educational materials provided on AVS.  Follow up with eye doctor in the next 2-3 days or sooner if symptoms do not improve/worsen. Orders Placed This Encounter   Medications    ciprofloxacin (CILOXAN) 0.3 % ophthalmic solution     Si gtt every 15 min for the first 6 hr, then 2 gtt every 30 min for the rest day 1. Day 2, 2 gtt q1h. Days 3-14, 2 drops q4h     Dispense:  120 drop     Refill:  0        Patient given educational materials - see patient instructions. Discussed use, benefit, and side effects of prescribed medications. All patientquestions answered. Pt voiced understanding.     Electronically signed by DESMOND Tay CNP on 2019at 11:44 AM

## 2019-09-04 ENCOUNTER — HOSPITAL ENCOUNTER (OUTPATIENT)
Age: 64
Setting detail: SPECIMEN
Discharge: HOME | End: 2019-09-04
Payer: MEDICARE

## 2019-09-04 LAB
FERRITIN: 1525 UG/L (ref 13–150)
IRON SATURATION: 51 % (ref 20–55)
IRON: 125 UG/DL (ref 37–145)
THYROXINE, FREE: 0.85 NG/DL (ref 0.93–1.7)
TOTAL IRON BINDING CAPACITY: 246 UG/DL (ref 250–450)
TSH SERPL DL<=0.05 MIU/L-ACNC: 2.22 MIU/L (ref 0.3–5)
UNSATURATED IRON BINDING CAPACITY: 121 UG/DL (ref 112–347)

## 2019-09-25 ENCOUNTER — OFFICE VISIT (OUTPATIENT)
Dept: FAMILY MEDICINE CLINIC | Age: 64
End: 2019-09-25
Payer: MEDICARE

## 2019-09-25 VITALS
SYSTOLIC BLOOD PRESSURE: 128 MMHG | RESPIRATION RATE: 18 BRPM | WEIGHT: 221 LBS | OXYGEN SATURATION: 93 % | BODY MASS INDEX: 36.82 KG/M2 | DIASTOLIC BLOOD PRESSURE: 82 MMHG | HEIGHT: 65 IN | HEART RATE: 82 BPM

## 2019-09-25 DIAGNOSIS — G43.009 MIGRAINE WITHOUT AURA AND WITHOUT STATUS MIGRAINOSUS, NOT INTRACTABLE: Primary | ICD-10-CM

## 2019-09-25 PROBLEM — J69.0 ASPIRATION PNEUMONIA (HCC): Status: RESOLVED | Noted: 2018-08-21 | Resolved: 2019-09-25

## 2019-09-25 PROBLEM — N17.9 AKI (ACUTE KIDNEY INJURY) (HCC): Status: RESOLVED | Noted: 2018-08-21 | Resolved: 2019-09-25

## 2019-09-25 PROCEDURE — 3017F COLORECTAL CA SCREEN DOC REV: CPT | Performed by: NURSE PRACTITIONER

## 2019-09-25 PROCEDURE — 1036F TOBACCO NON-USER: CPT | Performed by: NURSE PRACTITIONER

## 2019-09-25 PROCEDURE — 99214 OFFICE O/P EST MOD 30 MIN: CPT | Performed by: NURSE PRACTITIONER

## 2019-09-25 PROCEDURE — G8417 CALC BMI ABV UP PARAM F/U: HCPCS | Performed by: NURSE PRACTITIONER

## 2019-09-25 PROCEDURE — G8427 DOCREV CUR MEDS BY ELIG CLIN: HCPCS | Performed by: NURSE PRACTITIONER

## 2019-09-25 RX ORDER — APIXABAN 5 MG/1
TABLET, FILM COATED ORAL
Refills: 10 | COMMUNITY
Start: 2019-09-12 | End: 2021-07-26

## 2019-09-25 RX ORDER — METOPROLOL TARTRATE 50 MG/1
TABLET, FILM COATED ORAL
Refills: 10 | COMMUNITY
Start: 2019-09-12 | End: 2021-07-26

## 2019-09-25 RX ORDER — FLECAINIDE ACETATE 50 MG/1
TABLET ORAL
Refills: 10 | COMMUNITY
Start: 2019-09-12 | End: 2021-07-26

## 2019-09-25 RX ORDER — METOCLOPRAMIDE 10 MG/1
10 TABLET ORAL 4 TIMES DAILY
Qty: 60 TABLET | Refills: 1 | Status: SHIPPED | OUTPATIENT
Start: 2019-09-25 | End: 2020-07-07

## 2019-09-25 ASSESSMENT — ENCOUNTER SYMPTOMS
ALLERGIC/IMMUNOLOGIC NEGATIVE: 1
WHEEZING: 0
EYE ITCHING: 0
EYE REDNESS: 0
DIARRHEA: 0
RESPIRATORY NEGATIVE: 1
COLOR CHANGE: 0
EYE DISCHARGE: 0
SINUS PRESSURE: 0
VOMITING: 0
SHORTNESS OF BREATH: 0
VOICE CHANGE: 0
COUGH: 0
EYE PAIN: 0
SINUS PAIN: 0
PHOTOPHOBIA: 1
NAUSEA: 1

## 2019-09-25 NOTE — PROGRESS NOTES
BY MOUTH NIGHTLY 90 tablet 3    aspirin 81 MG tablet Take 81 mg by mouth      SUMAtriptan (IMITREX) 50 MG tablet Take 1 tablet by mouth once as needed for Migraine 9 tablet 0    rOPINIRole (REQUIP) 1 MG tablet Take 1 tablet by mouth 4 times daily 90 tablet 3    Multiple Vitamin (MULTIVITAMIN) tablet Take 1 tablet by mouth daily 30 tablet 3    traZODone (DESYREL) 50 MG tablet Take 1 tablet by mouth nightly (Patient not taking: Reported on 9/25/2019) 30 tablet 11     No current facility-administered medications for this visit. Allergies   Allergen Reactions    Pcn [Penicillins] Other (See Comments)     \" as a child\"      Neomycin Rash    Penicillin G Rash         HPI:     HPI    Presents today for new recurring headaches  Patient was seen for this back in March  Headaches are intermittent occurring 2-3 x per week   Pain is located behind R eyebrow and does not radiate , no associated aura   These are not similar to headaches in the past  H/o regular headaches that used to be relieved by Excedrin   MRI was ordered back in March and patient did not complete  Associated symptoms include nausea and light sensitivity and fatigue  Declines any dizziness, numbness, blurred vision, chest pain, abnormal weight loss, seizures, cold symptoms, fevers, tinnitus, watering of eyes etc.   Did not start a headache diary like we discussed  Declines association with food, drinks, caffeine, sneezing, coughing, bowel movements, sinuses, stress, sleep etc.  Excedrin is ineffective   Has tried Imitrex with some relief of symptoms but makes patient drowsy     No recent head trauma   Medical history significant for a-fib   Otherwise declines cardiac disease, HTN, DM2 etc.    Health Maintenance:      Subjective:     Review of Systems   Constitutional: Negative. Negative for activity change, appetite change, chills, diaphoresis, fatigue, fever and unexpected weight change. HENT: Negative.   Negative for congestion, ear pain,

## 2019-10-04 ENCOUNTER — OFFICE VISIT (OUTPATIENT)
Dept: FAMILY MEDICINE CLINIC | Age: 64
End: 2019-10-04
Payer: MEDICARE

## 2019-10-04 VITALS
OXYGEN SATURATION: 98 % | BODY MASS INDEX: 36.94 KG/M2 | WEIGHT: 222 LBS | DIASTOLIC BLOOD PRESSURE: 84 MMHG | HEART RATE: 83 BPM | SYSTOLIC BLOOD PRESSURE: 128 MMHG

## 2019-10-04 DIAGNOSIS — Z09 HOSPITAL DISCHARGE FOLLOW-UP: ICD-10-CM

## 2019-10-04 DIAGNOSIS — S39.012D STRAIN OF LUMBAR REGION, SUBSEQUENT ENCOUNTER: Primary | ICD-10-CM

## 2019-10-04 DIAGNOSIS — Z23 IMMUNIZATION DUE: ICD-10-CM

## 2019-10-04 PROBLEM — M62.82 NON-TRAUMATIC RHABDOMYOLYSIS: Status: RESOLVED | Noted: 2018-08-21 | Resolved: 2019-10-04

## 2019-10-04 PROBLEM — T42.4X1A BENZODIAZEPINE TOXICITY: Status: RESOLVED | Noted: 2018-06-29 | Resolved: 2019-10-04

## 2019-10-04 PROBLEM — E87.20 LACTIC ACIDOSIS: Status: RESOLVED | Noted: 2018-06-29 | Resolved: 2019-10-04

## 2019-10-04 PROBLEM — R73.9 HYPERGLYCEMIA: Status: RESOLVED | Noted: 2018-08-21 | Resolved: 2019-10-04

## 2019-10-04 PROBLEM — D72.829 LEUKOCYTOSIS: Status: RESOLVED | Noted: 2018-06-29 | Resolved: 2019-10-04

## 2019-10-04 PROBLEM — D75.1 POLYCYTHEMIA SECONDARY TO SMOKING: Status: RESOLVED | Noted: 2018-06-29 | Resolved: 2019-10-04

## 2019-10-04 PROBLEM — E87.0 HYPERNATREMIA: Status: RESOLVED | Noted: 2018-08-22 | Resolved: 2019-10-04

## 2019-10-04 PROBLEM — G93.40 ENCEPHALOPATHY ACUTE: Status: RESOLVED | Noted: 2018-08-21 | Resolved: 2019-10-04

## 2019-10-04 PROBLEM — R74.8 ELEVATED CK: Status: RESOLVED | Noted: 2018-06-29 | Resolved: 2019-10-04

## 2019-10-04 PROBLEM — E87.6 HYPOKALEMIA: Status: RESOLVED | Noted: 2018-08-21 | Resolved: 2019-10-04

## 2019-10-04 PROBLEM — E55.9 VITAMIN D DEFICIENCY: Status: RESOLVED | Noted: 2018-08-23 | Resolved: 2019-10-04

## 2019-10-04 PROBLEM — R89.7 ELEVATED MYOGLOBIN LEVEL: Status: RESOLVED | Noted: 2018-06-29 | Resolved: 2019-10-04

## 2019-10-04 PROBLEM — E72.20 HYPERAMMONEMIA (HCC): Status: RESOLVED | Noted: 2018-06-29 | Resolved: 2019-10-04

## 2019-10-04 PROBLEM — G93.41 ACUTE METABOLIC ENCEPHALOPATHY: Status: RESOLVED | Noted: 2018-06-29 | Resolved: 2019-10-04

## 2019-10-04 PROBLEM — G25.81 RESTLESS LEGS SYNDROME: Status: RESOLVED | Noted: 2017-07-05 | Resolved: 2019-10-04

## 2019-10-04 PROBLEM — R41.82 ALTERED MENTAL STATUS: Status: RESOLVED | Noted: 2018-06-28 | Resolved: 2019-10-04

## 2019-10-04 PROCEDURE — 99214 OFFICE O/P EST MOD 30 MIN: CPT | Performed by: NURSE PRACTITIONER

## 2019-10-04 PROCEDURE — 90471 IMMUNIZATION ADMIN: CPT | Performed by: NURSE PRACTITIONER

## 2019-10-04 PROCEDURE — G8417 CALC BMI ABV UP PARAM F/U: HCPCS | Performed by: NURSE PRACTITIONER

## 2019-10-04 PROCEDURE — G8427 DOCREV CUR MEDS BY ELIG CLIN: HCPCS | Performed by: NURSE PRACTITIONER

## 2019-10-04 PROCEDURE — 3017F COLORECTAL CA SCREEN DOC REV: CPT | Performed by: NURSE PRACTITIONER

## 2019-10-04 PROCEDURE — G8482 FLU IMMUNIZE ORDER/ADMIN: HCPCS | Performed by: NURSE PRACTITIONER

## 2019-10-04 PROCEDURE — 90688 IIV4 VACCINE SPLT 0.5 ML IM: CPT | Performed by: NURSE PRACTITIONER

## 2019-10-04 PROCEDURE — 4004F PT TOBACCO SCREEN RCVD TLK: CPT | Performed by: NURSE PRACTITIONER

## 2019-10-04 RX ORDER — PREDNISONE 10 MG/1
TABLET ORAL
Refills: 0 | COMMUNITY
Start: 2019-10-02 | End: 2019-10-21 | Stop reason: ALTCHOICE

## 2019-10-04 RX ORDER — TEMAZEPAM 15 MG/1
CAPSULE ORAL
Refills: 2 | COMMUNITY
Start: 2019-10-02

## 2019-10-04 RX ORDER — CYCLOBENZAPRINE HCL 10 MG
TABLET ORAL
Refills: 0 | COMMUNITY
Start: 2019-10-02 | End: 2019-10-21 | Stop reason: ALTCHOICE

## 2019-10-04 ASSESSMENT — ENCOUNTER SYMPTOMS
EYES NEGATIVE: 1
NAUSEA: 0
BOWEL INCONTINENCE: 0
GASTROINTESTINAL NEGATIVE: 1
BACK PAIN: 1
COLOR CHANGE: 0
VOMITING: 0
DIARRHEA: 0
RESPIRATORY NEGATIVE: 1
ALLERGIC/IMMUNOLOGIC NEGATIVE: 1
ABDOMINAL PAIN: 0

## 2019-10-10 ENCOUNTER — HOSPITAL ENCOUNTER (OUTPATIENT)
Dept: MRI IMAGING | Age: 64
Discharge: HOME | End: 2019-10-12
Payer: MEDICARE

## 2019-10-10 ENCOUNTER — TELEPHONE (OUTPATIENT)
Dept: FAMILY MEDICINE CLINIC | Age: 64
End: 2019-10-10

## 2019-10-10 DIAGNOSIS — G43.009 MIGRAINE WITHOUT AURA AND WITHOUT STATUS MIGRAINOSUS, NOT INTRACTABLE: ICD-10-CM

## 2019-10-10 DIAGNOSIS — R51.9 NEW ONSET OF HEADACHES AFTER AGE 50: ICD-10-CM

## 2019-10-10 LAB
CREAT SERPL-MCNC: 0.73 MG/DL (ref 0.5–0.9)
GFR AFRICAN AMERICAN: >60 ML/MIN
GFR NON-AFRICAN AMERICAN: >60 ML/MIN
GFR SERPL CREATININE-BSD FRML MDRD: NORMAL ML/MIN/{1.73_M2}
GFR SERPL CREATININE-BSD FRML MDRD: NORMAL ML/MIN/{1.73_M2}

## 2019-10-10 PROCEDURE — A9579 GAD-BASE MR CONTRAST NOS,1ML: HCPCS | Performed by: NURSE PRACTITIONER

## 2019-10-10 PROCEDURE — 82565 ASSAY OF CREATININE: CPT

## 2019-10-10 PROCEDURE — 36415 COLL VENOUS BLD VENIPUNCTURE: CPT

## 2019-10-10 PROCEDURE — 6360000004 HC RX CONTRAST MEDICATION: Performed by: NURSE PRACTITIONER

## 2019-10-10 PROCEDURE — 70553 MRI BRAIN STEM W/O & W/DYE: CPT

## 2019-10-10 RX ADMIN — GADOTERIDOL 20 ML: 279.3 INJECTION, SOLUTION INTRAVENOUS at 07:57

## 2019-10-16 DIAGNOSIS — R93.89 ABNORMAL MRI: Primary | ICD-10-CM

## 2019-10-21 ENCOUNTER — OFFICE VISIT (OUTPATIENT)
Dept: NEUROLOGY | Age: 64
End: 2019-10-21
Payer: MEDICARE

## 2019-10-21 VITALS
BODY MASS INDEX: 35.82 KG/M2 | HEART RATE: 65 BPM | DIASTOLIC BLOOD PRESSURE: 78 MMHG | SYSTOLIC BLOOD PRESSURE: 118 MMHG | WEIGHT: 215 LBS | HEIGHT: 65 IN

## 2019-10-21 DIAGNOSIS — R51.9 CHRONIC DAILY HEADACHE: Primary | ICD-10-CM

## 2019-10-21 DIAGNOSIS — F39 AFFECTIVE DISORDER (HCC): ICD-10-CM

## 2019-10-21 DIAGNOSIS — G44.221 CHRONIC TENSION-TYPE HEADACHE, INTRACTABLE: ICD-10-CM

## 2019-10-21 DIAGNOSIS — G43.719 INTRACTABLE CHRONIC MIGRAINE WITHOUT AURA AND WITHOUT STATUS MIGRAINOSUS: ICD-10-CM

## 2019-10-21 DIAGNOSIS — R42 VERTIGO: ICD-10-CM

## 2019-10-21 PROCEDURE — G8427 DOCREV CUR MEDS BY ELIG CLIN: HCPCS | Performed by: PSYCHIATRY & NEUROLOGY

## 2019-10-21 PROCEDURE — G8482 FLU IMMUNIZE ORDER/ADMIN: HCPCS | Performed by: PSYCHIATRY & NEUROLOGY

## 2019-10-21 PROCEDURE — 99214 OFFICE O/P EST MOD 30 MIN: CPT | Performed by: PSYCHIATRY & NEUROLOGY

## 2019-10-21 PROCEDURE — 4004F PT TOBACCO SCREEN RCVD TLK: CPT | Performed by: PSYCHIATRY & NEUROLOGY

## 2019-10-21 PROCEDURE — 3017F COLORECTAL CA SCREEN DOC REV: CPT | Performed by: PSYCHIATRY & NEUROLOGY

## 2019-10-21 PROCEDURE — G8417 CALC BMI ABV UP PARAM F/U: HCPCS | Performed by: PSYCHIATRY & NEUROLOGY

## 2019-10-21 RX ORDER — MECLIZINE HCL 12.5 MG/1
12.5 TABLET ORAL 3 TIMES DAILY PRN
Qty: 40 TABLET | Refills: 0 | Status: SHIPPED | OUTPATIENT
Start: 2019-10-21 | End: 2019-10-31

## 2019-10-21 RX ORDER — BUTALBITAL, ACETAMINOPHEN AND CAFFEINE 50; 325; 40 MG/1; MG/1; MG/1
TABLET ORAL
Qty: 30 TABLET | Refills: 0 | Status: SHIPPED | OUTPATIENT
Start: 2019-10-21 | End: 2020-08-14

## 2019-10-26 ENCOUNTER — HOSPITAL ENCOUNTER (OUTPATIENT)
Dept: GENERAL RADIOLOGY | Age: 64
Discharge: HOME | End: 2019-10-28
Payer: MEDICARE

## 2019-10-26 ENCOUNTER — HOSPITAL ENCOUNTER (OUTPATIENT)
Age: 64
Discharge: HOME | End: 2019-10-28
Payer: MEDICARE

## 2019-10-26 ENCOUNTER — HOSPITAL ENCOUNTER (OUTPATIENT)
Age: 64
Discharge: HOME | End: 2019-10-26
Payer: MEDICARE

## 2019-10-26 DIAGNOSIS — R93.89 ABNORMAL MRI: ICD-10-CM

## 2019-10-26 LAB
ABSOLUTE EOS #: 0.12 K/UL (ref 0–0.44)
ABSOLUTE IMMATURE GRANULOCYTE: 0.03 K/UL (ref 0–0.3)
ABSOLUTE LYMPH #: 2.51 K/UL (ref 1.1–3.7)
ABSOLUTE MONO #: 0.57 K/UL (ref 0.1–1.2)
ALBUMIN SERPL-MCNC: 4.2 G/DL (ref 3.5–5.2)
ALBUMIN/GLOBULIN RATIO: 1.3 (ref 1–2.5)
ALP BLD-CCNC: 113 U/L (ref 35–104)
ALT SERPL-CCNC: 18 U/L (ref 5–33)
ANION GAP SERPL CALCULATED.3IONS-SCNC: 12 MMOL/L (ref 9–17)
AST SERPL-CCNC: 19 U/L
BASOPHILS # BLD: 0 % (ref 0–2)
BASOPHILS ABSOLUTE: 0.03 K/UL (ref 0–0.2)
BILIRUB SERPL-MCNC: <0.1 MG/DL (ref 0.3–1.2)
BILIRUBIN URINE: NEGATIVE
BUN BLDV-MCNC: 14 MG/DL (ref 8–23)
BUN/CREAT BLD: ABNORMAL (ref 9–20)
CALCIUM SERPL-MCNC: 10 MG/DL (ref 8.6–10.4)
CHLORIDE BLD-SCNC: 99 MMOL/L (ref 98–107)
CO2: 26 MMOL/L (ref 20–31)
COLOR: YELLOW
COMMENT UA: NORMAL
CREAT SERPL-MCNC: 0.64 MG/DL (ref 0.5–0.9)
DIFFERENTIAL TYPE: ABNORMAL
EOSINOPHILS RELATIVE PERCENT: 2 % (ref 1–4)
GFR AFRICAN AMERICAN: >60 ML/MIN
GFR NON-AFRICAN AMERICAN: >60 ML/MIN
GFR SERPL CREATININE-BSD FRML MDRD: ABNORMAL ML/MIN/{1.73_M2}
GFR SERPL CREATININE-BSD FRML MDRD: ABNORMAL ML/MIN/{1.73_M2}
GLUCOSE BLD-MCNC: 94 MG/DL (ref 70–99)
GLUCOSE URINE: NEGATIVE
HCT VFR BLD CALC: 49.4 % (ref 36.3–47.1)
HEMOGLOBIN: 15.5 G/DL (ref 11.9–15.1)
IMMATURE GRANULOCYTES: 0 %
KETONES, URINE: NEGATIVE
LEUKOCYTE ESTERASE, URINE: NEGATIVE
LYMPHOCYTES # BLD: 34 % (ref 24–43)
MCH RBC QN AUTO: 31 PG (ref 25.2–33.5)
MCHC RBC AUTO-ENTMCNC: 31.4 G/DL (ref 28.4–34.8)
MCV RBC AUTO: 98.8 FL (ref 82.6–102.9)
MONOCYTES # BLD: 8 % (ref 3–12)
NITRITE, URINE: NEGATIVE
NRBC AUTOMATED: 0 PER 100 WBC
PDW BLD-RTO: 14.2 % (ref 11.8–14.4)
PH UA: 7.5 (ref 5–8)
PLATELET # BLD: 299 K/UL (ref 138–453)
PLATELET ESTIMATE: ABNORMAL
PMV BLD AUTO: 11.1 FL (ref 8.1–13.5)
POTASSIUM SERPL-SCNC: 4.2 MMOL/L (ref 3.7–5.3)
PROTEIN UA: NEGATIVE
RBC # BLD: 5 M/UL (ref 3.95–5.11)
RBC # BLD: ABNORMAL 10*6/UL
SEDIMENTATION RATE, ERYTHROCYTE: 10 MM (ref 0–20)
SEG NEUTROPHILS: 56 % (ref 36–65)
SEGMENTED NEUTROPHILS ABSOLUTE COUNT: 4.1 K/UL (ref 1.5–8.1)
SODIUM BLD-SCNC: 137 MMOL/L (ref 135–144)
SPECIFIC GRAVITY UA: 1.01 (ref 1–1.03)
TOTAL PROTEIN: 7.5 G/DL (ref 6.4–8.3)
TURBIDITY: CLEAR
URINE HGB: NEGATIVE
UROBILINOGEN, URINE: NORMAL
WBC # BLD: 7.4 K/UL (ref 3.5–11.3)
WBC # BLD: ABNORMAL 10*3/UL

## 2019-10-26 PROCEDURE — 85651 RBC SED RATE NONAUTOMATED: CPT

## 2019-10-26 PROCEDURE — 80053 COMPREHEN METABOLIC PANEL: CPT

## 2019-10-26 PROCEDURE — 72072 X-RAY EXAM THORAC SPINE 3VWS: CPT

## 2019-10-26 PROCEDURE — 85025 COMPLETE CBC W/AUTO DIFF WBC: CPT

## 2019-10-26 PROCEDURE — 81003 URINALYSIS AUTO W/O SCOPE: CPT

## 2019-10-26 PROCEDURE — 36415 COLL VENOUS BLD VENIPUNCTURE: CPT

## 2019-10-26 PROCEDURE — 72050 X-RAY EXAM NECK SPINE 4/5VWS: CPT

## 2019-10-31 DIAGNOSIS — R90.89 ABNORMAL FINDING ON MRI OF BRAIN: Primary | ICD-10-CM

## 2019-11-21 ENCOUNTER — HOSPITAL ENCOUNTER (OUTPATIENT)
Dept: MRI IMAGING | Age: 64
Discharge: HOME | End: 2019-11-23
Payer: MEDICARE

## 2019-11-21 DIAGNOSIS — R90.89 ABNORMAL FINDING ON MRI OF BRAIN: ICD-10-CM

## 2019-11-21 PROCEDURE — A9579 GAD-BASE MR CONTRAST NOS,1ML: HCPCS | Performed by: NURSE PRACTITIONER

## 2019-11-21 PROCEDURE — 72156 MRI NECK SPINE W/O & W/DYE: CPT

## 2019-11-21 PROCEDURE — 6360000004 HC RX CONTRAST MEDICATION: Performed by: NURSE PRACTITIONER

## 2019-11-21 RX ADMIN — GADOTERIDOL 20 ML: 279.3 INJECTION, SOLUTION INTRAVENOUS at 08:21

## 2020-03-26 NOTE — TELEPHONE ENCOUNTER
Jesus Avina was last seen 10/21/2019. She cancelled her 1/6/2020 appointment and has not rescheduled to date.

## 2020-03-27 NOTE — TELEPHONE ENCOUNTER
Please schedule a follow up visit. This patient needs a follow up visit. This patient is not seen for long time. If you are not able to get a follow up appointment; please talk to practice manager/ . Could you please look into it and keep me updated please. Let me know what happened. I really appreciate your help in this.    Thank you so much.   -dr. Ian Her

## 2020-03-31 ENCOUNTER — TELEPHONE (OUTPATIENT)
Dept: NEUROLOGY | Age: 65
End: 2020-03-31

## 2020-05-21 RX ORDER — MIRTAZAPINE 30 MG/1
30 TABLET, FILM COATED ORAL NIGHTLY
Qty: 90 TABLET | Refills: 0 | Status: SHIPPED | OUTPATIENT
Start: 2020-05-21 | End: 2020-09-08

## 2020-05-21 NOTE — TELEPHONE ENCOUNTER
Next Visit Date:  No future appointments. Health Maintenance   Topic Date Due    Hepatitis C screen  1955    HIV screen  02/12/1972    Cervical cancer screen  02/12/1978    Shingles Vaccine (1 of 2) 02/12/2007    Breast cancer screen  03/05/2014    Lipid screen  03/21/2017    Colon Cancer Screen FIT/FOBT  07/30/2019    DTaP/Tdap/Td vaccine (2 - Td) 10/06/2028    Flu vaccine  Completed    Pneumococcal 0-64 years Vaccine  Completed    Hepatitis A vaccine  Aged Out    Hepatitis B vaccine  Aged Out    Hib vaccine  Aged Out    Meningococcal (ACWY) vaccine  Aged Out       Hemoglobin A1C (%)   Date Value   08/22/2018 5.6   03/21/2012 4.7             ( goal A1C is < 7)   No results found for: LABMICR  LDL Cholesterol (mg/dL)   Date Value   03/21/2012 59       (goal LDL is <100)   AST (U/L)   Date Value   10/26/2019 19     ALT (U/L)   Date Value   10/26/2019 18     BUN (mg/dL)   Date Value   10/26/2019 14     BP Readings from Last 3 Encounters:   10/21/19 118/78   10/04/19 128/84   09/25/19 128/82          (goal 120/80)    All Future Testing planned in [facility]  Lab Frequency Next Occurrence   PT vestibular rehab Once 10/22/2019               Patient Active Problem List:     Current smoker     Mixed anxiety depressive disorder     RLS (restless legs syndrome)     Sleep disturbance     Thyroid nodule, Rt. Lobe.  27 mm     Recurrent major depressive disorder, in partial remission (HCC)     NSVT (nonsustained ventricular tachycardia) (HCC)     Depression     Anxiety

## 2020-07-06 ENCOUNTER — TELEPHONE (OUTPATIENT)
Dept: FAMILY MEDICINE CLINIC | Age: 65
End: 2020-07-06

## 2020-07-07 ENCOUNTER — VIRTUAL VISIT (OUTPATIENT)
Dept: FAMILY MEDICINE CLINIC | Age: 65
End: 2020-07-07
Payer: MEDICARE

## 2020-07-07 PROBLEM — N39.41 URGE INCONTINENCE: Status: ACTIVE | Noted: 2020-07-07

## 2020-07-07 PROCEDURE — 99213 OFFICE O/P EST LOW 20 MIN: CPT | Performed by: NURSE PRACTITIONER

## 2020-07-07 RX ORDER — OXYBUTYNIN CHLORIDE 5 MG/1
5 TABLET, EXTENDED RELEASE ORAL DAILY
Qty: 30 TABLET | Refills: 2 | Status: SHIPPED | OUTPATIENT
Start: 2020-07-07 | End: 2021-07-26

## 2020-07-07 ASSESSMENT — ENCOUNTER SYMPTOMS
GASTROINTESTINAL NEGATIVE: 1
EYES NEGATIVE: 1
DIARRHEA: 0
NAUSEA: 0
VOMITING: 0
COLOR CHANGE: 0
ALLERGIC/IMMUNOLOGIC NEGATIVE: 1
RESPIRATORY NEGATIVE: 1
CONSTIPATION: 0
ABDOMINAL PAIN: 0

## 2020-07-07 NOTE — PROGRESS NOTES
[facility]  [address]  Dept: [phone] Nancie Baker is a 63 y.o. female who presents today for her medical conditions/complaintsas noted below. Phyllis Goldstein is here today c/o Urinary Frequency    Past Medical History:   Diagnosis Date    A-fib [facility])     Anxiety     Degenerative disc disease, lumbar 05/2018    Depression     Frequent UTI     Infertility, female     Osteoarthritis     Restless leg syndrome     Thyroid nodule, Rt. Lobe.  27 mm       Past Surgical History:   Procedure Laterality Date    CATARACT REMOVAL  2007    right    DILATION AND CURETTAGE  1994    EYE SURGERY      \" as a child\"    EYE SURGERY  2013    cateract removal    TONSILLECTOMY Bilateral 1963    TONSILLECTOMY AND ADENOIDECTOMY         Family History   Problem Relation Age of Onset    Cancer Mother         pancreatic    Cancer Father     Arthritis Sister     Hypertension Mother     Osteoporosis Mother     Diabetes Paternal Grandmother     Lung Cancer Father        Social History     Tobacco Use    Smoking status: Light Tobacco Smoker     Packs/day: 2.00     Years: 3.00     Pack years: 6.00     Types: Cigarettes    Smokeless tobacco: Never Used    Tobacco comment: quit 2 months ago, started again   Substance Use Topics    Alcohol use: No     Comment: rarely      Current Outpatient Medications   Medication Sig Dispense Refill    mirtazapine (REMERON) 30 MG tablet Take 1 tablet by mouth nightly 90 tablet 0    verapamil (CALAN SR) 120 MG extended release tablet Take 1 tablet by mouth once daily 30 tablet 0    butalbital-acetaminophen-caffeine (FIORICET, ESGIC) -40 MG per tablet Take one tab once daily as needed for severe headache 30 tablet 0    temazepam (RESTORIL) 15 MG capsule TAKE 1 CAPSULE BY MOUTH AT BEDTIME  2    ELIQUIS 5 MG TABS tablet TAKE 1 TABLET BY MOUTH TWICE DAILY  10    flecainide (TAMBOCOR) 50 MG tablet TAKE 1 TABLET BY MOUTH EVERY 12 HOURS FOR 30 DAYS  10    metoprolol tartrate (LOPRESSOR) 50 MG tablet TAKE 1 2 (ONE HALF) TABLET BY MOUTH NIGHTLY FOR 30 DAYS  10    metoclopramide (REGLAN) 10 MG tablet Take 1 tablet by mouth 4 times daily 60 tablet 1    LYRICA 150 MG capsule Take 1 capsule by mouth 2 times daily. 5    aspirin 81 MG tablet Take 81 mg by mouth      rOPINIRole (REQUIP) 1 MG tablet Take 1 tablet by mouth 4 times daily 90 tablet 3    Multiple Vitamin (MULTIVITAMIN) tablet Take 1 tablet by mouth daily 30 tablet 3     No current facility-administered medications for this visit. Allergies   Allergen Reactions    Pcn [Penicillins] Other (See Comments)     \" as a child\"      Neomycin Rash    Penicillin G Rash         HPI:     HPI    Duglas Looney is a 63 y.o. female evaluated via telephone on 7/7/2020. Consent:  She and/or health care decision maker is aware that that she may receive a bill for this telephone service, depending on her insurance coverage, and has provided verbal consent to proceed: Yes  Documentation:  I communicated with the patient and/or health care decision maker about urinary incontinence   Details of this discussion including any medical advice provided: see note below       I affirm this is a Patient Initiated Episode with a Patient who has not had a related appointment within my department in the past 7 days or scheduled within the next 24 hours.     Patient identification was verified at the start of the visit: Yes    Total Time: minutes: 11-20 minutes    Note: not billable if this call serves to triage the patient into an appointment for the relevant concern      Megha Yin     C/o issues with urinary incontinence x a couple of months   Symptoms are unchanged from when they started   C/o urge incontinence and inability to hold her bladder if she feels the urge to go  Has to wear a pad because of this   Can affect her ability to go places due to frequently using the restroom Declines any associated stress incontinence , no issues with cough, sneeze etc.   C/o intermittent dribbling and sensation of incomplete emptying her bladder  Has never seen anyone for this before   Patient declines any associated dysuria , hematuria, fever/chills, abdominal pain, nausea/vomiting    H/o depression - takes Remeron   H/o RLS - follows with Sleep Medicine - Requip, Lyrica, Restoril   H/o migraines - Fioricet PRN   H/o atrial fib - metoprolol, tambocor, Eliquis, aspirin     Health Maintenance:      Subjective:     Review of Systems   Constitutional: Negative. Negative for appetite change, chills, diaphoresis and fever. HENT: Negative. Eyes: Negative. Respiratory: Negative. Cardiovascular: Negative. Gastrointestinal: Negative. Negative for abdominal pain, constipation, diarrhea, nausea and vomiting. Endocrine: Negative. Genitourinary: Positive for urgency (urge incontinence ). Negative for difficulty urinating, dysuria, frequency and hematuria. Musculoskeletal: Negative. Skin: Negative. Negative for color change, pallor, rash and wound. Allergic/Immunologic: Negative. Neurological: Negative. Hematological: Negative. Psychiatric/Behavioral: Negative. Objective:       Physical Exam    N/A     Assessment:         1. Urge incontinence    2. Depression, unspecified depression type        Plan:     1. Urge incontinence    - Urinalysis; Future  - Culture, Urine; Future  - oxybutynin (DITROPAN XL) 5 MG extended release tablet; Take 1 tablet by mouth daily 1 tablet qd, can increase to 2 qd after 1 week if needed  Dispense: 30 tablet; Refill: 2    Discussed treatment options including medication trial, urology referral, or PT pelvic floor   Patient would like to try medication, side effects discussed   Discussed avoiding bladder irritants  Check urine  Follow-up 1 month     2.  Depression, unspecified depression type    Discussed risk(s) with Remeron  I would recommend safer alternative dafne in light of co-morbidities    Patient agreeable to slow wean off medication (15 mg qd x 2-4 weeks)  F/u 1 month   Likely will try Zoloft if needed     Discussed use, benefit, and side effects of prescribed medications. All patient questions answered. Pt voiced understanding. Reviewed health maintenance. Instructed to continue current medications, diet and exercise. Patient agreedwith treatment plan. Follow up as directed.      Electronically signed by DESMOND Steele CNP on 7/7/2020

## 2020-07-15 ENCOUNTER — HOSPITAL ENCOUNTER (OUTPATIENT)
Age: 65
Setting detail: SPECIMEN
Discharge: HOME | End: 2020-07-15
Payer: MEDICARE

## 2020-07-15 LAB
BILIRUBIN URINE: NEGATIVE
COLOR: YELLOW
COMMENT UA: NORMAL
GLUCOSE URINE: NEGATIVE
KETONES, URINE: NEGATIVE
LEUKOCYTE ESTERASE, URINE: NEGATIVE
NITRITE, URINE: NEGATIVE
PH UA: 5 (ref 5–8)
PROTEIN UA: NEGATIVE
SPECIFIC GRAVITY UA: 1.01 (ref 1–1.03)
TURBIDITY: CLEAR
URINE HGB: NEGATIVE
UROBILINOGEN, URINE: NORMAL

## 2020-07-17 LAB
CULTURE: NORMAL
Lab: NORMAL
SPECIMEN DESCRIPTION: NORMAL

## 2020-07-23 ENCOUNTER — TELEPHONE (OUTPATIENT)
Dept: FAMILY MEDICINE CLINIC | Age: 65
End: 2020-07-23

## 2020-07-23 NOTE — TELEPHONE ENCOUNTER
PLEASE ADVISE STAFF POOL. APRIL FROM [employer] [phone]  CALLED ABOUT FORM COMPLETED FOR INCONTINENCE,THE DX ON FORM SAID AGE RELATED URGE INCONTINENCE AND April STATED THEY NEED DX THAT IS CAUSING INCONTINENCE THE ONES GIVEN WILL NOT WORK    Health Maintenance   Topic Date Due    Hepatitis C screen  1955    HIV screen  02/12/1972    Cervical cancer screen  02/12/1978    Shingles Vaccine (1 of 2) 02/12/2007    Breast cancer screen  03/05/2014    Lipid screen  03/21/2017    Colon Cancer Screen FIT/FOBT  07/30/2019    Flu vaccine (1) 09/01/2020    DTaP/Tdap/Td vaccine (2 - Td) 10/06/2028    Pneumococcal 0-64 years Vaccine  Completed    Hepatitis A vaccine  Aged Out    Hepatitis B vaccine  Aged Out    Hib vaccine  Aged Out    Meningococcal (ACWY) vaccine  Aged Out             (applicable per patient's age: Cancer Screenings, Depression Screening, Fall Risk Screening, Immunizations)    Hemoglobin A1C (%)   Date Value   08/22/2018 5.6   03/21/2012 4.7     LDL Cholesterol (mg/dL)   Date Value   03/21/2012 59     AST (U/L)   Date Value   10/26/2019 19     ALT (U/L)   Date Value   10/26/2019 18     BUN (mg/dL)   Date Value   10/26/2019 14      (goal A1C is < 7)   (goal LDL is <100) need 30-50% reduction from baseline     BP Readings from Last 3 Encounters:   10/21/19 118/78   10/04/19 128/84   09/25/19 128/82    (goal /80)      All Future Testing planned in [facility]:  Lab Frequency Next Occurrence   PT vestibular rehab Once 10/22/2019       Next Visit Date:  No future appointments. Patient Active Problem List:     Current smoker     Mixed anxiety depressive disorder     RLS (restless legs syndrome)     Sleep disturbance     Thyroid nodule, Rt. Lobe.  27 mm     Recurrent major depressive disorder, in partial remission (HCC)     NSVT (nonsustained ventricular tachycardia) (HCC)     Depression     Anxiety     Urge incontinence

## 2020-07-28 NOTE — TELEPHONE ENCOUNTER
Insurance may just not want to pay. I cannot use KATELYNN she did not have one.  We have already tried urge incontinence

## 2020-08-14 ENCOUNTER — VIRTUAL VISIT (OUTPATIENT)
Dept: FAMILY MEDICINE CLINIC | Age: 65
End: 2020-08-14
Payer: MEDICARE

## 2020-08-14 PROBLEM — I48.91 ATRIAL FIBRILLATION (HCC): Status: ACTIVE | Noted: 2018-08-21

## 2020-08-14 PROBLEM — F41.9 ANXIETY: Status: RESOLVED | Noted: 2018-08-29 | Resolved: 2020-08-14

## 2020-08-14 PROCEDURE — 99442 PR PHYS/QHP TELEPHONE EVALUATION 11-20 MIN: CPT | Performed by: NURSE PRACTITIONER

## 2020-08-14 ASSESSMENT — ENCOUNTER SYMPTOMS
SINUS PRESSURE: 0
EYE PAIN: 0
EYE WATERING: 0
SORE THROAT: 0
BLURRED VISION: 0
COUGH: 0
VOMITING: 0
ALLERGIC/IMMUNOLOGIC NEGATIVE: 1
EYE REDNESS: 0
ABDOMINAL PAIN: 0
PHOTOPHOBIA: 1
COLOR CHANGE: 0
RESPIRATORY NEGATIVE: 1
NAUSEA: 1
VISUAL CHANGE: 0

## 2020-08-14 NOTE — PROGRESS NOTES
[facility]  [address]  Dept: [address]ancie Baker is a 63 y.o. female who presents today for her medical conditions/complaintsas noted below. Aurora Franco is here today c/o Headache    Past Medical History:   Diagnosis Date    A-fib [facility])     Anxiety     Degenerative disc disease, lumbar 05/2018    Depression     Frequent UTI     Infertility, female     Osteoarthritis     Restless leg syndrome     Thyroid nodule, Rt. Lobe.  27 mm     Urge incontinence 7/7/2020      Past Surgical History:   Procedure Laterality Date    CATARACT REMOVAL  2007    right    DILATION AND CURETTAGE  1994    EYE SURGERY      \" as a child\"    EYE SURGERY  2013    cateract removal    TONSILLECTOMY Bilateral 1963    TONSILLECTOMY AND ADENOIDECTOMY         Family History   Problem Relation Age of Onset    Cancer Mother         pancreatic    Cancer Father     Arthritis Sister     Hypertension Mother     Osteoporosis Mother     Diabetes Paternal Grandmother     Lung Cancer Father        Social History     Tobacco Use    Smoking status: Light Tobacco Smoker     Packs/day: 2.00     Years: 3.00     Pack years: 6.00     Types: Cigarettes    Smokeless tobacco: Never Used    Tobacco comment: quit 2 months ago, started again   Substance Use Topics    Alcohol use: No     Comment: rarely      Current Outpatient Medications   Medication Sig Dispense Refill    oxybutynin (DITROPAN XL) 5 MG extended release tablet Take 1 tablet by mouth daily 1 tablet qd, can increase to 2 qd after 1 week if needed 30 tablet 2    mirtazapine (REMERON) 30 MG tablet Take 1 tablet by mouth nightly 90 tablet 0    butalbital-acetaminophen-caffeine (FIORICET, ESGIC) -40 MG per tablet Take one tab once daily as needed for severe headache 30 tablet 0    temazepam (RESTORIL) 15 MG capsule TAKE 1 CAPSULE BY MOUTH AT BEDTIME  2    ELIQUIS 5 MG TABS tablet TAKE 1 TABLET BY MOUTH TWICE DAILY  10    flecainide (TAMBOCOR) 50 MG tablet TAKE 1 TABLET BY MOUTH EVERY 12 HOURS FOR 30 DAYS  10    metoprolol tartrate (LOPRESSOR) 50 MG tablet TAKE 1 2 (ONE HALF) TABLET BY MOUTH NIGHTLY FOR 30 DAYS  10    LYRICA 150 MG capsule Take 1 capsule by mouth 2 times daily. 5    aspirin 81 MG tablet Take 81 mg by mouth      rOPINIRole (REQUIP) 1 MG tablet Take 1 tablet by mouth 4 times daily 90 tablet 3    Multiple Vitamin (MULTIVITAMIN) tablet Take 1 tablet by mouth daily 30 tablet 3     No current facility-administered medications for this visit. Allergies   Allergen Reactions    Pcn [Penicillins] Other (See Comments)     \" as a child\"      Neomycin Rash    Penicillin G Rash         HPI:     Headache    This is a recurrent problem. The current episode started more than 1 month ago. Episode frequency: every other day, every 3rd day. The problem has been gradually worsening. The pain is located in the right unilateral (behind R eyebrow) region. The pain quality is similar to prior headaches. The quality of the pain is described as aching. The pain is severe. Associated symptoms include anorexia, nausea, phonophobia and photophobia. Pertinent negatives include no abdominal pain, abnormal behavior, blurred vision, coughing, dizziness, drainage, ear pain, eye pain, eye redness, eye watering, fever, hearing loss, loss of balance, muscle aches, numbness, seizures, sinus pressure, sore throat, tingling, visual change, vomiting or weakness. The symptoms are aggravated by bright light (certain smells). She has tried NSAIDs (Ibuprofen PM ) for the symptoms. There is no history of hypertension, obesity or recent head traumas. Patient is weaning off her Remeron right now with good success   She is currently taking 15 mg qhs without increase in depression     Moiz Stephen is a 63 y.o. female evaluated via telephone on 8/14/2020.       Consent:  She and/or health care decision maker is aware that that she may receive a bill for this telephone service, depending on her insurance coverage, and has provided verbal consent to proceed: Yes      Documentation:  I communicated with the patient and/or health care decision maker about headaches, depression   Details of this discussion including any medical advice provided: see note above       I affirm this is a Patient Initiated Episode with a Patient who has not had a related appointment within my department in the past 7 days or scheduled within the next 24 hours. Patient identification was verified at the start of the visit: Yes    Total Time: minutes: 11-20 minutes    Note: not billable if this call serves to triage the patient into an appointment for the relevant concern      [address] Maintenance:    Subjective:     Review of Systems   Constitutional: Negative. Negative for appetite change, chills, diaphoresis and fever. HENT: Negative. Negative for ear pain, hearing loss, sinus pressure and sore throat. Eyes: Positive for photophobia. Negative for blurred vision, pain, redness and visual disturbance. Respiratory: Negative. Negative for cough. Cardiovascular: Negative. Gastrointestinal: Positive for anorexia and nausea. Negative for abdominal pain and vomiting. Endocrine: Negative. Genitourinary: Negative. Musculoskeletal: Negative. Skin: Negative. Negative for color change, pallor, rash and wound. Allergic/Immunologic: Negative. Neurological: Positive for headaches. Negative for dizziness, tingling, tremors, seizures, syncope, facial asymmetry, speech difficulty, weakness, light-headedness, numbness and loss of balance. Hematological: Negative. Psychiatric/Behavioral: Negative. Objective:       Physical Exam    N/A telephone call     Assessment:         1. Migraine without aura and without status migrainosus, not intractable    2. Depression, unspecified depression type    3.  NSVT (nonsustained ventricular tachycardia) ([facility] [address]    4. Encounter for screening mammogram for malignant neoplasm of breast    5. Screening for colon cancer        Plan:     1. Migraine without aura and without status migrainosus, not intractable    Given current medications/complexity of care I recommended f/u with Neurology   Advised she is a good candidate for prophylaxis and abortive  At this time I recommended Tylenol as abortive   NSAID contraindicated due to Eliquis   Patient will start a headache diary, stay well hydrated, and get good rest     2. Depression, unspecified depression type    Doing well with Remeron wean  We will discuss a safer alternative in the future if needed   Patient is aware to follow-up thereafter     3. Atrial fib ([facility] [address]    Follows w/ Cardiology   On BB , anticoagulation     4. Encounter for screening mammogram for malignant neoplasm of breast    - LILLIAM DIGITAL SCREEN W OR WO CAD BILATERAL; Future    5. Screening for colon cancer    - Cologuard (For External Results Only); Future    Discussed use, benefit, and side effects of prescribed medications. All patient questions answered. Pt voiced understanding. Reviewed health maintenance. Instructed to continue current medications, diet and exercise. Patient agreed with treatment plan. Follow up as directed.      Electronically signed by DESMOND Harrington CNP on 8/14/2020

## 2020-08-21 ENCOUNTER — HOSPITAL ENCOUNTER (OUTPATIENT)
Age: 65
Setting detail: SPECIMEN
Discharge: HOME | End: 2020-08-21
Payer: MEDICARE

## 2020-08-21 LAB
ALBUMIN SERPL-MCNC: 4.1 G/DL (ref 3.5–5.2)
ALBUMIN/GLOBULIN RATIO: 1.6 (ref 1–2.5)
ALP BLD-CCNC: 74 U/L (ref 35–104)
ALT SERPL-CCNC: 22 U/L (ref 5–33)
ANION GAP SERPL CALCULATED.3IONS-SCNC: 16 MMOL/L (ref 9–17)
AST SERPL-CCNC: 22 U/L
BILIRUB SERPL-MCNC: 0.17 MG/DL (ref 0.3–1.2)
BUN BLDV-MCNC: 16 MG/DL (ref 8–23)
BUN/CREAT BLD: ABNORMAL (ref 9–20)
CALCIUM SERPL-MCNC: 9.3 MG/DL (ref 8.6–10.4)
CHLORIDE BLD-SCNC: 104 MMOL/L (ref 98–107)
CO2: 23 MMOL/L (ref 20–31)
CREAT SERPL-MCNC: 0.58 MG/DL (ref 0.5–0.9)
GFR AFRICAN AMERICAN: >60 ML/MIN
GFR NON-AFRICAN AMERICAN: >60 ML/MIN
GFR SERPL CREATININE-BSD FRML MDRD: ABNORMAL ML/MIN/{1.73_M2}
GFR SERPL CREATININE-BSD FRML MDRD: ABNORMAL ML/MIN/{1.73_M2}
GLUCOSE BLD-MCNC: 85 MG/DL (ref 70–99)
HCT VFR BLD CALC: 49.8 % (ref 36.3–47.1)
HEMOGLOBIN: 15.7 G/DL (ref 11.9–15.1)
MAGNESIUM: 2.2 MG/DL (ref 1.6–2.6)
MCH RBC QN AUTO: 31.4 PG (ref 25.2–33.5)
MCHC RBC AUTO-ENTMCNC: 31.5 G/DL (ref 28.4–34.8)
MCV RBC AUTO: 99.6 FL (ref 82.6–102.9)
NRBC AUTOMATED: 0 PER 100 WBC
PDW BLD-RTO: 12.9 % (ref 11.8–14.4)
PLATELET # BLD: 265 K/UL (ref 138–453)
PMV BLD AUTO: 11.3 FL (ref 8.1–13.5)
POTASSIUM SERPL-SCNC: 4.6 MMOL/L (ref 3.7–5.3)
RBC # BLD: 5 M/UL (ref 3.95–5.11)
SODIUM BLD-SCNC: 143 MMOL/L (ref 135–144)
TOTAL PROTEIN: 6.6 G/DL (ref 6.4–8.3)
WBC # BLD: 12.8 K/UL (ref 3.5–11.3)

## 2020-09-08 RX ORDER — MIRTAZAPINE 15 MG/1
15 TABLET, FILM COATED ORAL NIGHTLY
Qty: 90 TABLET | Refills: 0 | Status: SHIPPED | OUTPATIENT
Start: 2020-09-08 | End: 2020-10-06

## 2020-09-08 NOTE — TELEPHONE ENCOUNTER
Patient called back stating that she is currently taking 15 mg ( 1/2 tablet) nightly.   She is afraid to cut it down any further at this time with 'everything that is going on'

## 2020-10-24 ENCOUNTER — TELEPHONE (OUTPATIENT)
Dept: FAMILY MEDICINE CLINIC | Age: 65
End: 2020-10-24

## 2020-11-24 ENCOUNTER — TELEPHONE (OUTPATIENT)
Dept: FAMILY MEDICINE CLINIC | Age: 65
End: 2020-11-24

## 2021-01-07 RX ORDER — MIRTAZAPINE 15 MG/1
TABLET, FILM COATED ORAL
Qty: 30 TABLET | Refills: 0 | Status: SHIPPED | OUTPATIENT
Start: 2021-01-07 | End: 2021-02-08

## 2021-01-07 NOTE — TELEPHONE ENCOUNTER
LM-re: mamm-call office if needs help scheduling and if already had we need to know where to get results

## 2021-01-07 NOTE — TELEPHONE ENCOUNTER
Kalin Ward is calling to request a refill on the following medication(s):    Medication Request:  Requested Prescriptions     Pending Prescriptions Disp Refills    mirtazapine (REMERON) 15 MG tablet [Pharmacy Med Name: MIRTAZAPINE 15 MG TABLET] 90 tablet 0     Sig: take 1 tablet by mouth at bedtime       Last Visit Date (If Applicable):  4/39/5691    Next Visit Date:    Visit date not found

## 2021-01-11 ENCOUNTER — TELEPHONE (OUTPATIENT)
Dept: FAMILY MEDICINE CLINIC | Age: 66
End: 2021-01-11

## 2021-01-11 NOTE — TELEPHONE ENCOUNTER
Pt was notified that she needs a mammogram.  She is not doing anything right now due to the covid 19 pandemic. She went to see her dermatologist  At [facility] and she was exposed to covid 19. The dermatologist has since passed away from covid 19. In March she will have her mammogram done, she feels more comfortable doing it then because the health care workers will be more vaccinated by then.

## 2021-01-11 NOTE — TELEPHONE ENCOUNTER
March is fine. It might be in her best interest to call now and schedule ahead of time to ensure it's completed. The breast care center was backed up at the end of last year anyhow for screening gilson's.

## 2021-02-08 RX ORDER — MIRTAZAPINE 15 MG/1
TABLET, FILM COATED ORAL
Qty: 30 TABLET | Refills: 0 | Status: SHIPPED | OUTPATIENT
Start: 2021-02-08 | End: 2021-04-07

## 2021-02-18 ENCOUNTER — TELEPHONE (OUTPATIENT)
Dept: FAMILY MEDICINE CLINIC | Age: 66
End: 2021-02-18

## 2021-03-02 ENCOUNTER — NURSE TRIAGE (OUTPATIENT)
Dept: OTHER | Facility: CLINIC | Age: 66
End: 2021-03-02

## 2021-03-02 ENCOUNTER — HOSPITAL ENCOUNTER (OUTPATIENT)
Age: 66
Setting detail: SPECIMEN
Discharge: HOME | End: 2021-03-02
Payer: MEDICARE

## 2021-03-02 LAB
ANION GAP SERPL CALCULATED.3IONS-SCNC: 8 MMOL/L (ref 9–17)
BUN BLDV-MCNC: 12 MG/DL (ref 8–23)
BUN/CREAT BLD: ABNORMAL (ref 9–20)
CALCIUM SERPL-MCNC: 9.6 MG/DL (ref 8.6–10.4)
CHLORIDE BLD-SCNC: 97 MMOL/L (ref 98–107)
CO2: 29 MMOL/L (ref 20–31)
CREAT SERPL-MCNC: 0.62 MG/DL (ref 0.5–0.9)
GFR AFRICAN AMERICAN: >60 ML/MIN
GFR NON-AFRICAN AMERICAN: >60 ML/MIN
GFR SERPL CREATININE-BSD FRML MDRD: ABNORMAL ML/MIN/{1.73_M2}
GFR SERPL CREATININE-BSD FRML MDRD: ABNORMAL ML/MIN/{1.73_M2}
GLUCOSE BLD-MCNC: 85 MG/DL (ref 70–99)
HCT VFR BLD CALC: 48.4 % (ref 36.3–47.1)
HEMOGLOBIN: 15.6 G/DL (ref 11.9–15.1)
MCH RBC QN AUTO: 32.1 PG (ref 25.2–33.5)
MCHC RBC AUTO-ENTMCNC: 32.2 G/DL (ref 28.4–34.8)
MCV RBC AUTO: 99.6 FL (ref 82.6–102.9)
NRBC AUTOMATED: 0 PER 100 WBC
PDW BLD-RTO: 13.3 % (ref 11.8–14.4)
PLATELET # BLD: 300 K/UL (ref 138–453)
PMV BLD AUTO: 10.9 FL (ref 8.1–13.5)
POTASSIUM SERPL-SCNC: 5.1 MMOL/L (ref 3.7–5.3)
RBC # BLD: 4.86 M/UL (ref 3.95–5.11)
SODIUM BLD-SCNC: 134 MMOL/L (ref 135–144)
WBC # BLD: 10.9 K/UL (ref 3.5–11.3)

## 2021-03-02 NOTE — TELEPHONE ENCOUNTER
Patient called [place] pre-service center [facility])  with red flag complaint. Brief description of triage: UTI symptoms to include pain and burning with urination, urgency, and pink color to urine    Triage indicates for patient to see in office today - advised to go to [facility] today if no availability    Care advice provided, patient verbalizes understanding; denies any other questions or concerns; instructed to call back for any new or worsening symptoms. Writer provided warm transfer to [facility] for appointment scheduling. Attention Provider: Thank you for allowing me to participate in the care of your patient. The patient was connected to triage in response to information provided to the [facility]. Please do not respond through this encounter as the response is not directed to a shared pool. Reason for Disposition   Urinating more frequently than usual (i.e., frequency)    Answer Assessment - Initial Assessment Questions  1. SYMPTOM: \"What's the main symptom you're concerned about? \" (e.g., frequency, incontinence)      Burning and pain with urination, urine is pink in color, urgency    2. ONSET: \"When did the symptoms start? \"     Today    3. PAIN: \"Is there any pain? \" If so, ask: \"How bad is it? \" (Scale: 1-10; mild, moderate, severe)      7/10  4. CAUSE: \"What do you think is causing the symptoms? \"      UTI    5. OTHER SYMPTOMS: \"Do you have any other symptoms? \" (e.g., fever, flank pain, blood in urine, pain with urination)      Denies flank pain or N/V or fever    6. PREGNANCY: \"Is there any chance you are pregnant? \" \"When was your last menstrual period? \"      NA    Protocols used: URINARY SYMPTOMS-ADULT-[place]

## 2021-03-03 ENCOUNTER — HOSPITAL ENCOUNTER (OUTPATIENT)
Dept: VASCULAR LAB | Age: 66
Discharge: HOME | End: 2021-03-03
Payer: MEDICARE

## 2021-03-03 DIAGNOSIS — R10.31 RT GROIN PAIN: ICD-10-CM

## 2021-03-03 PROCEDURE — 93926 LOWER EXTREMITY STUDY: CPT

## 2021-04-07 RX ORDER — MIRTAZAPINE 15 MG/1
TABLET, FILM COATED ORAL
Qty: 30 TABLET | Refills: 0 | Status: SHIPPED | OUTPATIENT
Start: 2021-04-07 | End: 2021-05-07

## 2021-04-07 NOTE — TELEPHONE ENCOUNTER
Letter sent to patient requesitng appointment   Kasia Hobson is calling to request a refill on the following medication(s):    Medication Request:  Requested Prescriptions     Pending Prescriptions Disp Refills    mirtazapine (REMERON) 15 MG tablet [Pharmacy Med Name: MIRTAZAPINE 15 MG TABLET] 30 tablet 0     Sig: take 1 tablet by mouth at bedtime       Last Visit Date (If Applicable):  9/48/8791    Next Visit Date:    Visit date not found

## 2021-04-07 NOTE — LETTER
[facility]  [address] [place] [address] [address]  [facility] [address]    4/7/2021    David Trinidad [phone]        Dear David Arora : In addition to helping you feel better when you are sick, we are interested in preventing illness and injury in the first place. In the spirit of maintaining your good health, your record indicates that you are due for an appointment. Please call [phone] to schedule. I look forward to seeing you soon.     Sincerely,           Estelle Arellano CNP/mb

## 2021-05-07 RX ORDER — MIRTAZAPINE 15 MG/1
TABLET, FILM COATED ORAL
Qty: 30 TABLET | Refills: 0 | Status: SHIPPED | OUTPATIENT
Start: 2021-05-07 | End: 2021-06-08

## 2021-05-15 ENCOUNTER — HOSPITAL ENCOUNTER (OUTPATIENT)
Dept: MAMMOGRAPHY | Age: 66
Discharge: HOME | End: 2021-05-17
Payer: MEDICARE

## 2021-05-15 DIAGNOSIS — Z12.31 ENCOUNTER FOR SCREENING MAMMOGRAM FOR MALIGNANT NEOPLASM OF BREAST: ICD-10-CM

## 2021-05-15 PROCEDURE — 77063 BREAST TOMOSYNTHESIS BI: CPT

## 2021-05-17 DIAGNOSIS — R92.8 ABNORMAL MAMMOGRAPHY: Primary | ICD-10-CM

## 2021-06-03 ENCOUNTER — HOSPITAL ENCOUNTER (OUTPATIENT)
Age: 66
Setting detail: SPECIMEN
Discharge: HOME | End: 2021-06-03
Payer: MEDICARE

## 2021-06-03 LAB
THYROXINE, FREE: 0.91 NG/DL (ref 0.93–1.7)
TSH SERPL DL<=0.05 MIU/L-ACNC: 1.61 MIU/L (ref 0.3–5)

## 2021-06-14 ENCOUNTER — HOSPITAL ENCOUNTER (OUTPATIENT)
Dept: MAMMOGRAPHY | Age: 66
Discharge: HOME | End: 2021-06-16
Payer: MEDICARE

## 2021-06-14 ENCOUNTER — HOSPITAL ENCOUNTER (OUTPATIENT)
Dept: ULTRASOUND IMAGING | Age: 66
Discharge: HOME | End: 2021-06-16
Payer: MEDICARE

## 2021-06-14 DIAGNOSIS — R92.8 ABNORMAL SCREENING MAMMOGRAM: Primary | ICD-10-CM

## 2021-06-14 DIAGNOSIS — R92.8 ABNORMAL MAMMOGRAPHY: ICD-10-CM

## 2021-06-14 PROCEDURE — G0279 TOMOSYNTHESIS, MAMMO: HCPCS

## 2021-06-14 PROCEDURE — 76642 ULTRASOUND BREAST LIMITED: CPT

## 2021-07-26 ENCOUNTER — OFFICE VISIT (OUTPATIENT)
Dept: FAMILY MEDICINE CLINIC | Age: 66
End: 2021-07-26
Payer: MEDICARE

## 2021-07-26 VITALS
DIASTOLIC BLOOD PRESSURE: 80 MMHG | OXYGEN SATURATION: 95 % | WEIGHT: 221.4 LBS | SYSTOLIC BLOOD PRESSURE: 118 MMHG | TEMPERATURE: 97.3 F | HEART RATE: 70 BPM | BODY MASS INDEX: 36.84 KG/M2

## 2021-07-26 DIAGNOSIS — F39 MOOD DISORDER (HCC): Primary | ICD-10-CM

## 2021-07-26 DIAGNOSIS — E66.09 CLASS 2 OBESITY DUE TO EXCESS CALORIES WITHOUT SERIOUS COMORBIDITY WITH BODY MASS INDEX (BMI) OF 36.0 TO 36.9 IN ADULT: ICD-10-CM

## 2021-07-26 DIAGNOSIS — Z86.79 S/P ABLATION OF ATRIAL FIBRILLATION: ICD-10-CM

## 2021-07-26 DIAGNOSIS — G25.81 RLS (RESTLESS LEGS SYNDROME): ICD-10-CM

## 2021-07-26 DIAGNOSIS — G43.909 MIGRAINE WITHOUT STATUS MIGRAINOSUS, NOT INTRACTABLE, UNSPECIFIED MIGRAINE TYPE: ICD-10-CM

## 2021-07-26 DIAGNOSIS — I48.0 PAROXYSMAL ATRIAL FIBRILLATION (HCC): ICD-10-CM

## 2021-07-26 DIAGNOSIS — Z98.890 S/P ABLATION OF ATRIAL FIBRILLATION: ICD-10-CM

## 2021-07-26 PROCEDURE — 99214 OFFICE O/P EST MOD 30 MIN: CPT | Performed by: NURSE PRACTITIONER

## 2021-07-26 PROCEDURE — G8427 DOCREV CUR MEDS BY ELIG CLIN: HCPCS | Performed by: NURSE PRACTITIONER

## 2021-07-26 PROCEDURE — 3017F COLORECTAL CA SCREEN DOC REV: CPT | Performed by: NURSE PRACTITIONER

## 2021-07-26 PROCEDURE — 4004F PT TOBACCO SCREEN RCVD TLK: CPT | Performed by: NURSE PRACTITIONER

## 2021-07-26 PROCEDURE — G8417 CALC BMI ABV UP PARAM F/U: HCPCS | Performed by: NURSE PRACTITIONER

## 2021-07-26 RX ORDER — MIRTAZAPINE 7.5 MG/1
7.5 TABLET, FILM COATED ORAL NIGHTLY
Qty: 30 TABLET | Refills: 0 | Status: SHIPPED | OUTPATIENT
Start: 2021-07-26 | End: 2021-10-08

## 2021-07-26 SDOH — ECONOMIC STABILITY: FOOD INSECURITY: WITHIN THE PAST 12 MONTHS, YOU WORRIED THAT YOUR FOOD WOULD RUN OUT BEFORE YOU GOT MONEY TO BUY MORE.: NEVER TRUE

## 2021-07-26 SDOH — ECONOMIC STABILITY: FOOD INSECURITY: WITHIN THE PAST 12 MONTHS, THE FOOD YOU BOUGHT JUST DIDN'T LAST AND YOU DIDN'T HAVE MONEY TO GET MORE.: NEVER TRUE

## 2021-07-26 SDOH — ECONOMIC STABILITY: TRANSPORTATION INSECURITY
IN THE PAST 12 MONTHS, HAS LACK OF TRANSPORTATION KEPT YOU FROM MEETINGS, WORK, OR FROM GETTING THINGS NEEDED FOR DAILY LIVING?: NO

## 2021-07-26 SDOH — ECONOMIC STABILITY: TRANSPORTATION INSECURITY
IN THE PAST 12 MONTHS, HAS THE LACK OF TRANSPORTATION KEPT YOU FROM MEDICAL APPOINTMENTS OR FROM GETTING MEDICATIONS?: NO

## 2021-07-26 ASSESSMENT — PATIENT HEALTH QUESTIONNAIRE - PHQ9
1. LITTLE INTEREST OR PLEASURE IN DOING THINGS: 1
SUM OF ALL RESPONSES TO PHQ QUESTIONS 1-9: 2
SUM OF ALL RESPONSES TO PHQ QUESTIONS 1-9: 2
2. FEELING DOWN, DEPRESSED OR HOPELESS: 1
SUM OF ALL RESPONSES TO PHQ QUESTIONS 1-9: 2
SUM OF ALL RESPONSES TO PHQ9 QUESTIONS 1 & 2: 2

## 2021-07-26 ASSESSMENT — ENCOUNTER SYMPTOMS
RESPIRATORY NEGATIVE: 1
VOMITING: 0
DIARRHEA: 0
NAUSEA: 0
COLOR CHANGE: 0
GASTROINTESTINAL NEGATIVE: 1
EYES NEGATIVE: 1
ALLERGIC/IMMUNOLOGIC NEGATIVE: 1
COUGH: 0
SHORTNESS OF BREATH: 0
CHEST TIGHTNESS: 0

## 2021-07-26 ASSESSMENT — SOCIAL DETERMINANTS OF HEALTH (SDOH): HOW HARD IS IT FOR YOU TO PAY FOR THE VERY BASICS LIKE FOOD, HOUSING, MEDICAL CARE, AND HEATING?: NOT HARD AT ALL

## 2021-07-26 NOTE — PROGRESS NOTES
[facility]  [address] [facility]  Dept: [address] is a 61 y.o. female who presents today for her medical conditions/complaintsas noted below. Kevin Sanders is here today c/o 1 Year Follow Up    Past Medical History:   Diagnosis Date    A-fib ([address]     Anxiety     Atrial fibrillation ([facility] [address] 08/21/2018    Degenerative disc disease, lumbar 05/2018    Depression     Frequent UTI     Infertility, female     Osteoarthritis     Restless leg syndrome     Squamous cell carcinoma in situ     Thyroid nodule, Rt. Lobe. 27 mm     Urge incontinence 07/07/2020      Past Surgical History:   Procedure Laterality Date    CATARACT REMOVAL  2007    right    DILATION AND CURETTAGE  1994    EYE SURGERY      \" as a child\"    EYE SURGERY  2013    cateract removal    TONSILLECTOMY Bilateral 1963    TONSILLECTOMY AND ADENOIDECTOMY         Family History   Problem Relation Age of Onset    Cancer Mother         pancreatic    Cancer Father     Arthritis Sister     Hypertension Mother     Osteoporosis Mother     Diabetes Paternal Grandmother     Lung Cancer Father        Social History     Tobacco Use    Smoking status: Light Tobacco Smoker     Packs/day: 2.00     Years: 3.00     Pack years: 6.00     Types: Cigarettes    Smokeless tobacco: Never Used    Tobacco comment: quit 2 months ago, started again   Substance Use Topics    Alcohol use: No     Comment: rarely      Current Outpatient Medications   Medication Sig Dispense Refill    mirtazapine (REMERON) 7.5 MG tablet Take 1 tablet by mouth nightly 30 tablet 0    temazepam (RESTORIL) 15 MG capsule TAKE 1 CAPSULE BY MOUTH AT BEDTIME  2    LYRICA 150 MG capsule Take 1 capsule by mouth 2 times daily.   5    aspirin 81 MG tablet Take 81 mg by mouth      rOPINIRole (REQUIP) 1 MG tablet Take 1 tablet by mouth 4 times daily 90 tablet 3    Multiple Vitamin (MULTIVITAMIN) tablet Take 1 tablet by mouth daily 30 tablet 3     No current facility-administered medications for this visit. Allergies   Allergen Reactions    Pcn [Penicillins] Other (See Comments)     \" as a child\"      Neomycin Rash    Penicillin G Rash         HPI:     HPI    Presents today c/o follow-up    H/o depression / mood disorder - Remeron   Declines any overt bothersome depression  Admits to stress , irritability , etc.  She would like to get of med due to weight gain  Family history + depression    PHQ 9 - 2   Declines SI/HI    Works out 5-6 days per week   Has changed diet   Having a hard time losing weight    H/o RLS - Requip, Restoril, Lyrica  Follows w/ Leslie King - Sleep Medicine   Upcoming sleep study scheduled   Upcoming lung cancer screening CT     H/o thyroid nodule - follows w/ Endocrine q yearly for ultrasounds     Interested in migraine management    Follows w/ Dermatology - h/o SCC     H/o atrial fib   S/p cardiac ablation  Follows w/ [address] [place] [address] Cardiology   Her rate control & anticoagulation were stopped recently by specialist     Health Maintenance:      Subjective:     Review of Systems   Constitutional: Negative. Negative for appetite change, chills, diaphoresis, fatigue, fever and unexpected weight change (weight gain). HENT: Negative. Eyes: Negative. Respiratory: Negative. Negative for cough, chest tightness and shortness of breath. Cardiovascular: Negative. Negative for chest pain, palpitations and leg swelling. Gastrointestinal: Negative. Negative for diarrhea, nausea and vomiting. Endocrine: Negative. Genitourinary: Negative. Negative for difficulty urinating and dysuria. Musculoskeletal: Negative. Skin: Negative. Negative for color change, pallor, rash and wound. Allergic/Immunologic: Negative. Neurological: Negative. Negative for dizziness, seizures, syncope, light-headedness and headaches. Hematological: Negative. Does not bruise/bleed easily. Psychiatric/Behavioral: Negative. Negative for dysphoric mood, hallucinations and sleep disturbance. The patient is not nervous/anxious. Objective:     Vitals:    07/26/21 0945   BP: 118/80   Pulse: 70   Temp: 97.3 °F (36.3 °C)   SpO2: 95%       Body mass index is 36.84 kg/m². Physical Exam  Constitutional:       General: She is not in acute distress. Appearance: Normal appearance. She is well-developed. She is obese. She is not ill-appearing, toxic-appearing or diaphoretic. HENT:      Head: Normocephalic and atraumatic. Right Ear: External ear normal.      Left Ear: External ear normal.      Nose: Nose normal.   Eyes:      General: No scleral icterus. Right eye: No discharge. Left eye: No discharge. Conjunctiva/sclera: Conjunctivae normal.      Pupils: Pupils are equal, round, and reactive to light. Neck:      Trachea: No tracheal deviation. Cardiovascular:      Rate and Rhythm: Normal rate and regular rhythm. Heart sounds: Normal heart sounds. No murmur heard. No friction rub. No gallop. Pulmonary:      Effort: Pulmonary effort is normal. No tachypnea, accessory muscle usage or respiratory distress. Breath sounds: Normal breath sounds. No stridor. No decreased breath sounds, wheezing, rhonchi or rales. Abdominal:      Palpations: Abdomen is soft. Musculoskeletal:         General: No tenderness or deformity. Normal range of motion. Cervical back: Normal range of motion and neck supple. Skin:     General: Skin is warm and dry. Coloration: Skin is not pale. Findings: No erythema or rash. Neurological:      Mental Status: She is alert and oriented to person, place, and time. GCS: GCS eye subscore is 4. GCS verbal subscore is 5. GCS motor subscore is 6. Gait: Gait is intact. Gait normal.   Psychiatric:         Speech: Speech normal.         Behavior: Behavior normal.         Thought Content:  Thought content normal.

## 2021-07-30 ENCOUNTER — HOSPITAL ENCOUNTER (OUTPATIENT)
Age: 66
Setting detail: SPECIMEN
Discharge: HOME | End: 2021-07-30

## 2021-07-30 DIAGNOSIS — E66.09 CLASS 2 OBESITY DUE TO EXCESS CALORIES WITHOUT SERIOUS COMORBIDITY WITH BODY MASS INDEX (BMI) OF 36.0 TO 36.9 IN ADULT: ICD-10-CM

## 2021-07-30 LAB
ABSOLUTE EOS #: 0.09 K/UL (ref 0–0.44)
ABSOLUTE IMMATURE GRANULOCYTE: 0.03 K/UL (ref 0–0.3)
ABSOLUTE LYMPH #: 2.61 K/UL (ref 1.1–3.7)
ABSOLUTE MONO #: 0.43 K/UL (ref 0.1–1.2)
ALBUMIN SERPL-MCNC: 4.1 G/DL (ref 3.5–5.2)
ALBUMIN/GLOBULIN RATIO: 1.5 (ref 1–2.5)
ALP BLD-CCNC: 83 U/L (ref 35–104)
ALT SERPL-CCNC: 17 U/L (ref 5–33)
ANION GAP SERPL CALCULATED.3IONS-SCNC: 14 MMOL/L (ref 9–17)
AST SERPL-CCNC: 23 U/L
BASOPHILS # BLD: 1 % (ref 0–2)
BASOPHILS ABSOLUTE: 0.04 K/UL (ref 0–0.2)
BILIRUB SERPL-MCNC: 0.43 MG/DL (ref 0.3–1.2)
BUN BLDV-MCNC: 12 MG/DL (ref 8–23)
BUN/CREAT BLD: ABNORMAL (ref 9–20)
CALCIUM SERPL-MCNC: 9.5 MG/DL (ref 8.6–10.4)
CHLORIDE BLD-SCNC: 106 MMOL/L (ref 98–107)
CHOLESTEROL/HDL RATIO: 4.2
CHOLESTEROL: 175 MG/DL
CO2: 22 MMOL/L (ref 20–31)
CREAT SERPL-MCNC: 0.76 MG/DL (ref 0.5–0.9)
DIFFERENTIAL TYPE: ABNORMAL
EOSINOPHILS RELATIVE PERCENT: 1 % (ref 1–4)
GFR AFRICAN AMERICAN: >60 ML/MIN
GFR NON-AFRICAN AMERICAN: >60 ML/MIN
GFR SERPL CREATININE-BSD FRML MDRD: ABNORMAL ML/MIN/{1.73_M2}
GFR SERPL CREATININE-BSD FRML MDRD: ABNORMAL ML/MIN/{1.73_M2}
GLUCOSE BLD-MCNC: 101 MG/DL (ref 70–99)
HCT VFR BLD CALC: 50.7 % (ref 36.3–47.1)
HDLC SERPL-MCNC: 42 MG/DL
HEMOGLOBIN: 16.1 G/DL (ref 11.9–15.1)
IMMATURE GRANULOCYTES: 0 %
LDL CHOLESTEROL: 102 MG/DL (ref 0–130)
LYMPHOCYTES # BLD: 34 % (ref 24–43)
MCH RBC QN AUTO: 31.1 PG (ref 25.2–33.5)
MCHC RBC AUTO-ENTMCNC: 31.8 G/DL (ref 28.4–34.8)
MCV RBC AUTO: 98.1 FL (ref 82.6–102.9)
MONOCYTES # BLD: 6 % (ref 3–12)
NRBC AUTOMATED: 0 PER 100 WBC
PDW BLD-RTO: 13.2 % (ref 11.8–14.4)
PLATELET # BLD: 279 K/UL (ref 138–453)
PLATELET ESTIMATE: ABNORMAL
PMV BLD AUTO: 11.2 FL (ref 8.1–13.5)
POTASSIUM SERPL-SCNC: 4.7 MMOL/L (ref 3.7–5.3)
RBC # BLD: 5.17 M/UL (ref 3.95–5.11)
RBC # BLD: ABNORMAL 10*6/UL
SEG NEUTROPHILS: 58 % (ref 36–65)
SEGMENTED NEUTROPHILS ABSOLUTE COUNT: 4.53 K/UL (ref 1.5–8.1)
SODIUM BLD-SCNC: 142 MMOL/L (ref 135–144)
TOTAL PROTEIN: 6.9 G/DL (ref 6.4–8.3)
TRIGL SERPL-MCNC: 154 MG/DL
VLDLC SERPL CALC-MCNC: ABNORMAL MG/DL (ref 1–30)
WBC # BLD: 7.7 K/UL (ref 3.5–11.3)
WBC # BLD: ABNORMAL 10*3/UL

## 2021-08-01 LAB
ESTIMATED AVERAGE GLUCOSE: 123 MG/DL
HBA1C MFR BLD: 5.9 % (ref 4–6)

## 2021-08-02 PROBLEM — R73.01 IFG (IMPAIRED FASTING GLUCOSE): Status: ACTIVE | Noted: 2021-08-02

## 2021-08-06 ENCOUNTER — OFFICE VISIT (OUTPATIENT)
Dept: FAMILY MEDICINE CLINIC | Age: 66
End: 2021-08-06
Payer: MEDICARE

## 2021-08-06 ENCOUNTER — HOSPITAL ENCOUNTER (OUTPATIENT)
Age: 66
Setting detail: SPECIMEN
Discharge: HOME | End: 2021-08-06
Payer: MEDICARE

## 2021-08-06 VITALS
HEART RATE: 72 BPM | DIASTOLIC BLOOD PRESSURE: 70 MMHG | SYSTOLIC BLOOD PRESSURE: 120 MMHG | WEIGHT: 216.8 LBS | HEIGHT: 65 IN | OXYGEN SATURATION: 91 % | BODY MASS INDEX: 36.12 KG/M2

## 2021-08-06 DIAGNOSIS — R19.7 DIARRHEA, UNSPECIFIED TYPE: Primary | ICD-10-CM

## 2021-08-06 DIAGNOSIS — R19.7 DIARRHEA, UNSPECIFIED TYPE: ICD-10-CM

## 2021-08-06 DIAGNOSIS — R10.9 ABDOMINAL CRAMPING: ICD-10-CM

## 2021-08-06 LAB
ABSOLUTE EOS #: 0.07 K/UL (ref 0–0.44)
ABSOLUTE IMMATURE GRANULOCYTE: <0.03 K/UL (ref 0–0.3)
ABSOLUTE LYMPH #: 3.26 K/UL (ref 1.1–3.7)
ABSOLUTE MONO #: 0.55 K/UL (ref 0.1–1.2)
ANION GAP SERPL CALCULATED.3IONS-SCNC: 13 MMOL/L (ref 9–17)
BASOPHILS # BLD: 1 % (ref 0–2)
BASOPHILS ABSOLUTE: 0.04 K/UL (ref 0–0.2)
BUN BLDV-MCNC: 13 MG/DL (ref 8–23)
BUN/CREAT BLD: NORMAL (ref 9–20)
CALCIUM SERPL-MCNC: 10.3 MG/DL (ref 8.6–10.4)
CHLORIDE BLD-SCNC: 103 MMOL/L (ref 98–107)
CO2: 23 MMOL/L (ref 20–31)
CREAT SERPL-MCNC: 0.72 MG/DL (ref 0.5–0.9)
DIFFERENTIAL TYPE: NORMAL
EOSINOPHILS RELATIVE PERCENT: 1 % (ref 1–4)
GFR AFRICAN AMERICAN: >60 ML/MIN
GFR NON-AFRICAN AMERICAN: >60 ML/MIN
GFR SERPL CREATININE-BSD FRML MDRD: NORMAL ML/MIN/{1.73_M2}
GFR SERPL CREATININE-BSD FRML MDRD: NORMAL ML/MIN/{1.73_M2}
GLUCOSE BLD-MCNC: 88 MG/DL (ref 70–99)
HCT VFR BLD CALC: 47.1 % (ref 36.3–47.1)
HEMOGLOBIN: 14.9 G/DL (ref 11.9–15.1)
IMMATURE GRANULOCYTES: 0 %
LYMPHOCYTES # BLD: 39 % (ref 24–43)
MCH RBC QN AUTO: 31 PG (ref 25.2–33.5)
MCHC RBC AUTO-ENTMCNC: 31.6 G/DL (ref 28.4–34.8)
MCV RBC AUTO: 98.1 FL (ref 82.6–102.9)
MONOCYTES # BLD: 7 % (ref 3–12)
NRBC AUTOMATED: 0 PER 100 WBC
PDW BLD-RTO: 12.9 % (ref 11.8–14.4)
PLATELET # BLD: 260 K/UL (ref 138–453)
PLATELET ESTIMATE: NORMAL
PMV BLD AUTO: 11.5 FL (ref 8.1–13.5)
POTASSIUM SERPL-SCNC: 4.7 MMOL/L (ref 3.7–5.3)
RBC # BLD: 4.8 M/UL (ref 3.95–5.11)
RBC # BLD: NORMAL 10*6/UL
SEG NEUTROPHILS: 52 % (ref 36–65)
SEGMENTED NEUTROPHILS ABSOLUTE COUNT: 4.42 K/UL (ref 1.5–8.1)
SODIUM BLD-SCNC: 139 MMOL/L (ref 135–144)
WBC # BLD: 8.4 K/UL (ref 3.5–11.3)
WBC # BLD: NORMAL 10*3/UL

## 2021-08-06 PROCEDURE — G8427 DOCREV CUR MEDS BY ELIG CLIN: HCPCS | Performed by: NURSE PRACTITIONER

## 2021-08-06 PROCEDURE — G8417 CALC BMI ABV UP PARAM F/U: HCPCS | Performed by: NURSE PRACTITIONER

## 2021-08-06 PROCEDURE — 99214 OFFICE O/P EST MOD 30 MIN: CPT | Performed by: NURSE PRACTITIONER

## 2021-08-06 PROCEDURE — 4004F PT TOBACCO SCREEN RCVD TLK: CPT | Performed by: NURSE PRACTITIONER

## 2021-08-06 PROCEDURE — 3017F COLORECTAL CA SCREEN DOC REV: CPT | Performed by: NURSE PRACTITIONER

## 2021-08-06 RX ORDER — DICYCLOMINE HYDROCHLORIDE 10 MG/1
10 CAPSULE ORAL 4 TIMES DAILY
Qty: 20 CAPSULE | Refills: 0 | Status: SHIPPED | OUTPATIENT
Start: 2021-08-06 | End: 2021-08-23 | Stop reason: ALTCHOICE

## 2021-08-06 ASSESSMENT — ENCOUNTER SYMPTOMS
VOMITING: 0
DIARRHEA: 1
ABDOMINAL PAIN: 1
NAUSEA: 1
BLOOD IN STOOL: 0

## 2021-08-06 ASSESSMENT — PATIENT HEALTH QUESTIONNAIRE - PHQ9
SUM OF ALL RESPONSES TO PHQ9 QUESTIONS 1 & 2: 0
SUM OF ALL RESPONSES TO PHQ QUESTIONS 1-9: 0
2. FEELING DOWN, DEPRESSED OR HOPELESS: 0
SUM OF ALL RESPONSES TO PHQ QUESTIONS 1-9: 0
1. LITTLE INTEREST OR PLEASURE IN DOING THINGS: 0
SUM OF ALL RESPONSES TO PHQ QUESTIONS 1-9: 0

## 2021-08-06 NOTE — PROGRESS NOTES
[place] [address]  [address] [facility]. [facility] [address]  [place], [address]  [phone]  [phone]    [address] is a 61 y.o. female who is here with c/o of:    Chief Complaint: Diarrhea and Abdominal Pain      Patient Accompanied by: n/a    HPI - [address] is here today with c/o: [address] is a 61 y.o. female who presents for evaluation of abdominal pain. Onset was 2 weeks ago. Symptoms have been unchanged. The pain is described as cramping. Pain is located in the generalized without radiation. Aggravating factors: bowel movement and eating. Alleviating factors: none. Associated symptoms: diarrhea, flatus, nausea and decreased appetite. The patient denies chills, constipation, fever, headache, melena and vomiting. Health Maintenance Due   Topic Date Due    Hepatitis C screen  Never done    HIV screen  Never done    Cervical cancer screen  Never done    Shingles Vaccine (1 of 2) Never done        Patient Active Problem List:     Current smoker     Mixed anxiety depressive disorder     RLS (restless legs syndrome)     Sleep disturbance     Thyroid nodule, Rt. Lobe. 27 mm     Atrial fibrillation (HCC)     Depression     Urge incontinence     S/P ablation of atrial fibrillation     IFG (impaired fasting glucose)     Past Medical History:   Diagnosis Date    A-fib ([address]     Anxiety     Atrial fibrillation ([facility] [address] 08/21/2018    Degenerative disc disease, lumbar 05/2018    Depression     Frequent UTI     Infertility, female     Osteoarthritis     Restless leg syndrome     Squamous cell carcinoma in situ     Thyroid nodule, Rt. Lobe.  27 mm     Urge incontinence 07/07/2020      Past Surgical History:   Procedure Laterality Date    CATARACT REMOVAL  2007    right    DILATION AND CURETTAGE  1994    EYE SURGERY      \" as a child\"    EYE SURGERY  2013    cateract removal    TONSILLECTOMY Bilateral 1963    TONSILLECTOMY AND ADENOIDECTOMY       Family disturbance and suicidal ideas. Objective   Physical Exam  Abdominal:      General: Bowel sounds are normal.      Palpations: Abdomen is soft. Tenderness: There is generalized abdominal tenderness. PHYSICAL EXAM:   · Constitutional: Cesar Foley is oriented to person, place, and time. Vital signs are normal. Appears well-developed and well-nourished. · Eyes:PERRL, EOMI, Conjunctiva normal, No discharge. · Neck: Full passive range of motion. Non-tender on palpation. Neck supple. No thyromegaly present. Trachea normal.  · Cardiovascular: Normal rate, regular rhythm, S1, S2, no murmur, no gallop, no friction rub, intact distal pulses. · Pulmonary/Chest: Breath sounds are clear throughout, No respiratory distress, No wheezing, No chest tenderness. Effort normal  · Musculoskeletal: Extremities appear regular and symmetric. No evident masses, lesions, foreign bodies, or other abnormalities. No edema. No tenderness on palpation. Joints are stable. Full ROM, strength and tone are within normal limits. · Neurological: Alert and oriented to person, place, and time. Normal motor function, Normal sensory function, No focal deficits noted. He has normal strength. · Skin: Skin is warm, dry and intact. No obvious lesions on exposed skin  · Psychiatric: Normal mood and affect. Speech is normal and behavior is normal.     Nursing note and vitals reviewed. Blood pressure 120/70, pulse 72, height 5' 5\" (1.651 m), weight 216 lb 12.8 oz (98.3 kg), SpO2 91 %. Body mass index is 36.08 kg/m².     Wt Readings from Last 3 Encounters:   08/06/21 216 lb 12.8 oz (98.3 kg)   07/26/21 221 lb 6.4 oz (100.4 kg)   10/21/19 215 lb (97.5 kg)     BP Readings from Last 3 Encounters:   08/06/21 120/70   07/26/21 118/80   10/21/19 118/78       Hospital Outpatient Visit on 07/30/2021   Component Date Value Ref Range Status    WBC 07/30/2021 7.7  3.5 - 11.3 k/uL Final    RBC 07/30/2021 5.17* 3.95 - 5.11 m/uL Final    Hemoglobin 07/30/2021 16.1* 11.9 - 15.1 g/dL Final    Hematocrit 07/30/2021 50.7* 36.3 - 47.1 % Final    MCV 07/30/2021 98.1  82.6 - 102.9 fL Final    MCH 07/30/2021 31.1  25.2 - 33.5 pg Final    MCHC 07/30/2021 31.8  28.4 - 34.8 g/dL Final    RDW 07/30/2021 13.2  11.8 - 14.4 % Final    Platelets 05/46/9283 279  138 - 453 k/uL Final    MPV 07/30/2021 11.2  8.1 - 13.5 fL Final    NRBC Automated 07/30/2021 0.0  0.0 per 100 WBC Final    Differential Type 07/30/2021 NOT REPORTED   Final    Seg Neutrophils 07/30/2021 58  36 - 65 % Final    Lymphocytes 07/30/2021 34  24 - 43 % Final    Monocytes 07/30/2021 6  3 - 12 % Final    Eosinophils % 07/30/2021 1  1 - 4 % Final    Basophils 07/30/2021 1  0 - 2 % Final    Immature Granulocytes 07/30/2021 0  0 % Final    Segs Absolute 07/30/2021 4.53  1.50 - 8.10 k/uL Final    Absolute Lymph # 07/30/2021 2.61  1.10 - 3.70 k/uL Final    Absolute Mono # 07/30/2021 0.43  0.10 - 1.20 k/uL Final    Absolute Eos # 07/30/2021 0.09  0.00 - 0.44 k/uL Final    Basophils Absolute 07/30/2021 0.04  0.00 - 0.20 k/uL Final    Absolute Immature Granulocyte 07/30/2021 0.03  0.00 - 0.30 k/uL Final    WBC Morphology 07/30/2021 NOT REPORTED   Final    RBC Morphology 07/30/2021 NOT REPORTED   Final    Platelet Estimate 83/24/7130 NOT REPORTED   Final    Glucose 07/30/2021 101* 70 - 99 mg/dL Final    BUN 07/30/2021 12  8 - 23 mg/dL Final    CREATININE 07/30/2021 0.76  0.50 - 0.90 mg/dL Final    Bun/Cre Ratio 07/30/2021 NOT REPORTED  9 - 20 Final    Calcium 07/30/2021 9.5  8.6 - 10.4 mg/dL Final    Sodium 07/30/2021 142  135 - 144 mmol/L Final    Potassium 07/30/2021 4.7  3.7 - 5.3 mmol/L Final    Chloride 07/30/2021 106  98 - 107 mmol/L Final    CO2 07/30/2021 22  20 - 31 mmol/L Final    Anion Gap 07/30/2021 14  9 - 17 mmol/L Final    Alkaline Phosphatase 07/30/2021 83  35 - 104 U/L Final    ALT 07/30/2021 17  5 - 33 U/L Final    AST 07/30/2021 23  <32 U/L Final    Total Bilirubin 07/30/2021 0.43  0.3 - 1.2 mg/dL Final    Total Protein 07/30/2021 6.9  6.4 - 8.3 g/dL Final    Albumin 07/30/2021 4.1  3.5 - 5.2 g/dL Final    Albumin/Globulin Ratio 07/30/2021 1.5  1.0 - 2.5 Final    GFR Non- 07/30/2021 >60  >60 mL/min Final    GFR  07/30/2021 >60  >60 mL/min Final    GFR Comment 07/30/2021        Final    Comment: Average GFR for 61-76 years old:   80 mL/min/1.73sq m  Chronic Kidney Disease:   <60 mL/min/1.73sq m  Kidney failure:   <15 mL/min/1.73sq m              eGFR calculated using average adult body mass. Additional eGFR calculator available at:        [email or web address]            GFR Staging 07/30/2021 NOT REPORTED   Final    Hemoglobin A1C 07/30/2021 5.9  4.0 - 6.0 % Final    Estimated Avg Glucose 07/30/2021 123  mg/dL Final    Comment: The ADA and AACC recommend providing the estimated average glucose result to permit better   patient understanding of their HBA1c result.  Cholesterol 07/30/2021 175  <200 mg/dL Final    Comment:    Cholesterol Guidelines:      <200  Desirable   200-240  Borderline      >240  Undesirable         HDL 07/30/2021 42  >40 mg/dL Final    Comment:    HDL Guidelines:    <40     Undesirable   40-59    Borderline    >59     Desirable         LDL Cholesterol 07/30/2021 102  0 - 130 mg/dL Final    Comment:    LDL Guidelines:     <100    Desirable   100-129   Near to/above Desirable   130-159   Borderline      >159   Undesirable     Direct (measured) LDL and calculated LDL are not interchangeable tests.  Chol/HDL Ratio 07/30/2021 4.2  <5 Final            Triglycerides 07/30/2021 154* <150 mg/dL Final    Comment:    Triglyceride Guidelines:     <150   Desirable   150-199  Borderline   200-499  High     >499   Very high   Based on AHA Guidelines for fasting triglyceride, October 2012.          VLDL 07/30/2021 NOT REPORTED* 1 - 30 mg/dL Final     No results found for this visit on 08/06/21. Completed Orders/Prescriptions   Orders Placed This Encounter   Medications    dicyclomine (BENTYL) 10 MG capsule     Sig: Take 1 capsule by mouth 4 times daily for 5 days     Dispense:  20 capsule     Refill:  0               AssessmentPlan/Medical Decision Making     1. Diarrhea, unspecified type    - Recommend BRAT diet (water/gatorade)  - CBC With Auto Differential; Future  - O&P Panel (Travel Associated) #1; Future  - Clostridium Difficile Toxin/Antigen; Future  - Occult Blood Screen; Future  - Gastrointestinal Panel, Molecular; Future  - Basic Metabolic Panel; Future  - Fecal lactoferrin; Future    2. Abdominal cramping    - Recommend BRAT diet and hydration (water/gatorade)  - CBC With Auto Differential; Future  - O&P Panel (Travel Associated) #1; Future  - Clostridium Difficile Toxin/Antigen; Future  - Occult Blood Screen; Future  - Gastrointestinal Panel, Molecular; Future  - Basic Metabolic Panel; Future  - dicyclomine (BENTYL) 10 MG capsule; Take 1 capsule by mouth 4 times daily for 5 days  Dispense: 20 capsule; Refill: 0      Return in about 1 week (around 8/13/2021) for f/u diarrhea/abdominal pain. 1.  Darylene Lopes received counseling on the following healthy behaviors: nutrition  2. Patient given educational materials - see patient instructions  3. Was a self-tracking handout given in paper form or via [employer]? No  If yes, see orders or list here. 4.  Discussed use, benefit, and side effects of prescribed medications. Barriers to medication compliance addressed. All patient questions answered. Pt voiced understanding. 5.  Reviewed prior labs, imaging, consultation, follow up, and health maintenance  6. Continue current medications, diet and exercise. 7. Discussed use, benefit, and side effects of prescribed medications. Barriers to medication compliance addressed. All her questions were answered. Pt voiced understanding.  Darylene Lopes will continue current medications, diet and exercise. Of the 30 minute duration appointment visit, Fidelia Smith CNP spent at least 50% of the face-to-face time in counseling, explanation of diagnosis, planning of further management, and answering all questions.           Signed:  Fidelia Smith CNP

## 2021-08-09 ENCOUNTER — HOSPITAL ENCOUNTER (OUTPATIENT)
Age: 66
Setting detail: SPECIMEN
Discharge: HOME | End: 2021-08-09
Payer: MEDICARE

## 2021-08-09 DIAGNOSIS — R10.9 ABDOMINAL CRAMPING: ICD-10-CM

## 2021-08-09 DIAGNOSIS — R19.7 DIARRHEA, UNSPECIFIED TYPE: ICD-10-CM

## 2021-08-09 LAB
LACTOFERRIN, QUAL: NORMAL
Lab: NORMAL
MICRO OVA & PARASITES: NORMAL
SPECIMEN DESCRIPTION: NORMAL

## 2021-08-10 LAB
C DIFF AG + TOXIN: NEGATIVE
CAMPYLOBACTER PCR: NORMAL
DATE, STOOL #1: NORMAL
DATE, STOOL #2: NORMAL
DATE, STOOL #3: NORMAL
DIRECT EXAM: NORMAL
DIRECT EXAM: NORMAL
E COLI ENTEROTOXIGENIC PCR: NORMAL
HEMOCCULT SP1 STL QL: NEGATIVE
HEMOCCULT SP2 STL QL: NORMAL
HEMOCCULT SP3 STL QL: NORMAL
Lab: NORMAL
PLESIOMONAS SHIGELLOIDES PCR: NORMAL
SALMONELLA PCR: NORMAL
SHIGATOXIN GENE PCR: NORMAL
SHIGELLA SP PCR: NORMAL
SPECIMEN DESCRIPTION: NORMAL
TIME, STOOL #1: NORMAL
TIME, STOOL #2: NORMAL
TIME, STOOL #3: NORMAL
VIBRIO PCR: NORMAL
YERSINIA ENTEROCOLITICA PCR: NORMAL

## 2021-08-13 ENCOUNTER — HOSPITAL ENCOUNTER (OUTPATIENT)
Dept: CT IMAGING | Facility: CLINIC | Age: 66
Discharge: HOME | End: 2021-08-15
Payer: MEDICARE

## 2021-08-13 ENCOUNTER — OFFICE VISIT (OUTPATIENT)
Dept: FAMILY MEDICINE CLINIC | Age: 66
End: 2021-08-13
Payer: MEDICARE

## 2021-08-13 ENCOUNTER — HOSPITAL ENCOUNTER (OUTPATIENT)
Facility: CLINIC | Age: 66
Discharge: HOME | End: 2021-08-13
Payer: MEDICARE

## 2021-08-13 VITALS
WEIGHT: 219 LBS | DIASTOLIC BLOOD PRESSURE: 72 MMHG | BODY MASS INDEX: 36.44 KG/M2 | TEMPERATURE: 97.4 F | SYSTOLIC BLOOD PRESSURE: 102 MMHG | OXYGEN SATURATION: 96 % | HEART RATE: 72 BPM

## 2021-08-13 DIAGNOSIS — R10.9 ABDOMINAL CRAMPING: Primary | ICD-10-CM

## 2021-08-13 DIAGNOSIS — R19.7 DIARRHEA OF PRESUMED INFECTIOUS ORIGIN: ICD-10-CM

## 2021-08-13 DIAGNOSIS — R10.9 ABDOMINAL CRAMPING: ICD-10-CM

## 2021-08-13 PROBLEM — K83.8 DILATION OF BILIARY TRACT: Status: ACTIVE | Noted: 2021-08-13

## 2021-08-13 LAB
-: ABNORMAL
ABSOLUTE EOS #: 0.1 K/UL (ref 0–0.4)
ABSOLUTE IMMATURE GRANULOCYTE: ABNORMAL K/UL (ref 0–0.3)
ABSOLUTE LYMPH #: 2.2 K/UL (ref 1–4.8)
ABSOLUTE MONO #: 0.6 K/UL (ref 0.1–1.2)
ALBUMIN SERPL-MCNC: 4.4 G/DL (ref 3.5–5.2)
ALBUMIN/GLOBULIN RATIO: 1.5 (ref 1–2.5)
ALP BLD-CCNC: 87 U/L (ref 35–104)
ALT SERPL-CCNC: 18 U/L (ref 5–33)
AMORPHOUS: ABNORMAL
ANION GAP SERPL CALCULATED.3IONS-SCNC: 11 MMOL/L (ref 9–17)
AST SERPL-CCNC: 26 U/L
BACTERIA: ABNORMAL
BASOPHILS # BLD: 1 % (ref 0–2)
BASOPHILS ABSOLUTE: 0 K/UL (ref 0–0.2)
BILIRUB SERPL-MCNC: 0.2 MG/DL (ref 0.3–1.2)
BILIRUBIN URINE: NEGATIVE
BUN BLDV-MCNC: 13 MG/DL (ref 8–23)
BUN/CREAT BLD: ABNORMAL (ref 9–20)
CALCIUM SERPL-MCNC: 9.8 MG/DL (ref 8.6–10.4)
CASTS UA: ABNORMAL /LPF (ref 0–2)
CASTS UA: ABNORMAL /LPF (ref 0–2)
CHLORIDE BLD-SCNC: 102 MMOL/L (ref 98–107)
CO2: 26 MMOL/L (ref 20–31)
COLOR: YELLOW
COMMENT UA: ABNORMAL
CREAT SERPL-MCNC: 0.6 MG/DL (ref 0.5–0.9)
CRYSTALS, UA: ABNORMAL /HPF
DIFFERENTIAL TYPE: ABNORMAL
EOSINOPHILS RELATIVE PERCENT: 1 % (ref 1–4)
EPITHELIAL CELLS UA: ABNORMAL /HPF (ref 0–5)
GFR AFRICAN AMERICAN: >60 ML/MIN
GFR NON-AFRICAN AMERICAN: >60 ML/MIN
GFR SERPL CREATININE-BSD FRML MDRD: ABNORMAL ML/MIN/{1.73_M2}
GFR SERPL CREATININE-BSD FRML MDRD: ABNORMAL ML/MIN/{1.73_M2}
GLUCOSE BLD-MCNC: 99 MG/DL (ref 70–99)
GLUCOSE URINE: NEGATIVE
HCT VFR BLD CALC: 46.8 % (ref 36–46)
HEMOGLOBIN: 15.5 G/DL (ref 12–16)
IMMATURE GRANULOCYTES: ABNORMAL %
KETONES, URINE: NEGATIVE
LEUKOCYTE ESTERASE, URINE: ABNORMAL
LIPASE: 21 U/L (ref 13–60)
LYMPHOCYTES # BLD: 25 % (ref 24–44)
MCH RBC QN AUTO: 32 PG (ref 26–34)
MCHC RBC AUTO-ENTMCNC: 33.2 G/DL (ref 31–37)
MCV RBC AUTO: 96.1 FL (ref 80–100)
MONOCYTES # BLD: 7 % (ref 2–11)
MUCUS: ABNORMAL
NITRITE, URINE: NEGATIVE
NRBC AUTOMATED: ABNORMAL PER 100 WBC
OTHER OBSERVATIONS UA: ABNORMAL
PDW BLD-RTO: 13.5 % (ref 12.5–15.4)
PH UA: 6 (ref 5–8)
PLATELET # BLD: 231 K/UL (ref 140–450)
PLATELET ESTIMATE: ABNORMAL
PMV BLD AUTO: 9.2 FL (ref 6–12)
POTASSIUM SERPL-SCNC: 5.2 MMOL/L (ref 3.7–5.3)
PROTEIN UA: NEGATIVE
RBC # BLD: 4.87 M/UL (ref 4–5.2)
RBC # BLD: ABNORMAL 10*6/UL
RBC UA: ABNORMAL /HPF (ref 0–2)
RENAL EPITHELIAL, UA: ABNORMAL /HPF
SEG NEUTROPHILS: 66 % (ref 36–66)
SEGMENTED NEUTROPHILS ABSOLUTE COUNT: 5.9 K/UL (ref 1.8–7.7)
SODIUM BLD-SCNC: 139 MMOL/L (ref 135–144)
SPECIFIC GRAVITY UA: 1.01 (ref 1–1.03)
TOTAL PROTEIN: 7.3 G/DL (ref 6.4–8.3)
TRICHOMONAS: ABNORMAL
TURBIDITY: CLEAR
URINE HGB: NEGATIVE
UROBILINOGEN, URINE: NORMAL
WBC # BLD: 8.8 K/UL (ref 3.5–11)
WBC # BLD: ABNORMAL 10*3/UL
WBC UA: ABNORMAL /HPF (ref 0–5)
YEAST: ABNORMAL

## 2021-08-13 PROCEDURE — 83690 ASSAY OF LIPASE: CPT

## 2021-08-13 PROCEDURE — 3017F COLORECTAL CA SCREEN DOC REV: CPT | Performed by: NURSE PRACTITIONER

## 2021-08-13 PROCEDURE — 85025 COMPLETE CBC W/AUTO DIFF WBC: CPT

## 2021-08-13 PROCEDURE — 6360000004 HC RX CONTRAST MEDICATION: Performed by: NURSE PRACTITIONER

## 2021-08-13 PROCEDURE — 74177 CT ABD & PELVIS W/CONTRAST: CPT

## 2021-08-13 PROCEDURE — 80053 COMPREHEN METABOLIC PANEL: CPT

## 2021-08-13 PROCEDURE — 81001 URINALYSIS AUTO W/SCOPE: CPT

## 2021-08-13 PROCEDURE — 4004F PT TOBACCO SCREEN RCVD TLK: CPT | Performed by: NURSE PRACTITIONER

## 2021-08-13 PROCEDURE — 99214 OFFICE O/P EST MOD 30 MIN: CPT | Performed by: NURSE PRACTITIONER

## 2021-08-13 PROCEDURE — 2580000003 HC RX 258: Performed by: NURSE PRACTITIONER

## 2021-08-13 PROCEDURE — 87086 URINE CULTURE/COLONY COUNT: CPT

## 2021-08-13 PROCEDURE — G8417 CALC BMI ABV UP PARAM F/U: HCPCS | Performed by: NURSE PRACTITIONER

## 2021-08-13 PROCEDURE — G8427 DOCREV CUR MEDS BY ELIG CLIN: HCPCS | Performed by: NURSE PRACTITIONER

## 2021-08-13 PROCEDURE — 36415 COLL VENOUS BLD VENIPUNCTURE: CPT

## 2021-08-13 RX ORDER — LOPERAMIDE HYDROCHLORIDE 2 MG/1
2 CAPSULE ORAL 4 TIMES DAILY PRN
COMMUNITY
End: 2021-12-13

## 2021-08-13 RX ORDER — 0.9 % SODIUM CHLORIDE 0.9 %
70 INTRAVENOUS SOLUTION INTRAVENOUS ONCE
Status: COMPLETED | OUTPATIENT
Start: 2021-08-13 | End: 2021-08-13

## 2021-08-13 RX ORDER — SODIUM CHLORIDE 0.9 % (FLUSH) 0.9 %
10 SYRINGE (ML) INJECTION PRN
Status: DISCONTINUED | OUTPATIENT
Start: 2021-08-13 | End: 2021-08-16 | Stop reason: HOSPADM

## 2021-08-13 RX ADMIN — Medication 10 ML: at 09:19

## 2021-08-13 RX ADMIN — IOPAMIDOL 75 ML: 755 INJECTION, SOLUTION INTRAVENOUS at 09:17

## 2021-08-13 RX ADMIN — SODIUM CHLORIDE 70 ML: 9 INJECTION, SOLUTION INTRAVENOUS at 09:19

## 2021-08-13 ASSESSMENT — ENCOUNTER SYMPTOMS
SORE THROAT: 0
COUGH: 0
DIARRHEA: 1
CONSTIPATION: 0
RESPIRATORY NEGATIVE: 1
BLOOD IN STOOL: 0
BELCHING: 1
EYES NEGATIVE: 1
ABDOMINAL PAIN: 1
SHORTNESS OF BREATH: 0
FLATUS: 1
HEMATOCHEZIA: 0
ALLERGIC/IMMUNOLOGIC NEGATIVE: 1
COLOR CHANGE: 0
VOMITING: 0
NAUSEA: 0
ANAL BLEEDING: 0

## 2021-08-13 ASSESSMENT — PATIENT HEALTH QUESTIONNAIRE - PHQ9
SUM OF ALL RESPONSES TO PHQ9 QUESTIONS 1 & 2: 0
SUM OF ALL RESPONSES TO PHQ QUESTIONS 1-9: 0
SUM OF ALL RESPONSES TO PHQ QUESTIONS 1-9: 0
2. FEELING DOWN, DEPRESSED OR HOPELESS: 0
SUM OF ALL RESPONSES TO PHQ QUESTIONS 1-9: 0
1. LITTLE INTEREST OR PLEASURE IN DOING THINGS: 0

## 2021-08-13 ASSESSMENT — CROHNS DISEASE ACTIVITY INDEX (CDAI): CDAI SCORE: 0

## 2021-08-13 NOTE — PROGRESS NOTES
[facility]  [address]  Dept: [address] is a 61 y.o. female who presents today for her medical conditions/complaintsas noted below. Tonya Fuentes is here today c/o Abdominal Pain (f/u), Diarrhea, and Discuss Labs    Past Medical History:   Diagnosis Date    A-fib [facility])     Anxiety     Atrial fibrillation ([address] 08/21/2018    Degenerative disc disease, lumbar 05/2018    Depression     Frequent UTI     Infertility, female     Osteoarthritis     Restless leg syndrome     Squamous cell carcinoma in situ     Thyroid nodule, Rt. Lobe. 27 mm     Urge incontinence 07/07/2020      Past Surgical History:   Procedure Laterality Date    CATARACT REMOVAL  2007    right    DILATION AND CURETTAGE  1994    EYE SURGERY      \" as a child\"    EYE SURGERY  2013    cateract removal    TONSILLECTOMY Bilateral 1963    TONSILLECTOMY AND ADENOIDECTOMY         Family History   Problem Relation Age of Onset    Cancer Mother         pancreatic    Cancer Father     Arthritis Sister     Hypertension Mother     Osteoporosis Mother     Diabetes Paternal Grandmother     Lung Cancer Father        Social History     Tobacco Use    Smoking status: Light Tobacco Smoker     Packs/day: 2.00     Years: 3.00     Pack years: 6.00     Types: Cigarettes    Smokeless tobacco: Never Used    Tobacco comment: quit 2 months ago, started again   Substance Use Topics    Alcohol use: No     Comment: rarely      Current Outpatient Medications   Medication Sig Dispense Refill    dicyclomine (BENTYL) 10 MG capsule Take 1 capsule by mouth 4 times daily for 5 days 20 capsule 0    mirtazapine (REMERON) 7.5 MG tablet Take 1 tablet by mouth nightly 30 tablet 0    temazepam (RESTORIL) 15 MG capsule TAKE 1 CAPSULE BY MOUTH AT BEDTIME  2    LYRICA 150 MG capsule Take 1 capsule by mouth 2 times daily.   5    aspirin 81 MG tablet Take 81 mg by mouth      rOPINIRole (REQUIP) 1 MG tablet Take 1 tablet by mouth 4 times daily 90 tablet 3    Multiple Vitamin (MULTIVITAMIN) tablet Take 1 tablet by mouth daily 30 tablet 3     No current facility-administered medications for this visit. Allergies   Allergen Reactions    Pcn [Penicillins] Other (See Comments)     \" as a child\"      Neomycin Rash    Penicillin G Rash         HPI:     Abdominal Pain  This is a new problem. The current episode started 1 to 4 weeks ago (3 weeks of abdominal cramping & loose stools). The onset quality is gradual. The problem occurs daily. The problem has been unchanged. The pain is located in the generalized abdominal region. The pain is moderate. The quality of the pain is cramping. The abdominal pain does not radiate. Associated symptoms include anorexia, belching, diarrhea (once this week, c/o loose stools) and flatus. Pertinent negatives include no arthralgias, constipation (only secondary to anti-diarrheals ), dysuria, fever, frequency, headaches, hematochezia, hematuria, melena, myalgias, nausea, vomiting or weight loss. Associated symptoms comments: Indigestion . Nothing aggravates the pain. The pain is relieved by nothing. Treatments tried: Bentyl, antidiarrheals (Imodium) - took 5 this AM, has to wear a diaper. The treatment provided no relief. There is no history of abdominal surgery, colon cancer, Crohn's disease, gallstones, GERD, irritable bowel syndrome, pancreatitis, PUD or ulcerative colitis. Presents today c/o follow-up abdominal cramping & diarrhea   Saw  8/6 for the same   CBC, BMP & stool studies were checked & negative  Tolerating PO intake    No sick contacts or recent travel   Not related to Remeron dose decrease   Doesn't follow w/ GI doctor   No significant GI history     Health Maintenance:      Subjective:     Review of Systems   Constitutional: Positive for appetite change (dec.) and fatigue.  Negative for chills, diaphoresis, fever, unexpected weight change and weight loss. HENT: Negative. Negative for congestion, ear pain and sore throat. Eyes: Negative. Respiratory: Negative. Negative for cough and shortness of breath. Cardiovascular: Negative. Gastrointestinal: Positive for abdominal pain, anorexia, diarrhea (once this week, c/o loose stools) and flatus. Negative for anal bleeding, blood in stool, constipation (only secondary to anti-diarrheals ), hematochezia, melena, nausea and vomiting. Endocrine: Negative. Genitourinary: Negative. Negative for difficulty urinating, dysuria, flank pain, frequency, hematuria and urgency. Musculoskeletal: Negative. Negative for arthralgias and myalgias. Skin: Negative. Negative for color change, pallor, rash and wound. Allergic/Immunologic: Negative. Neurological: Negative. Negative for seizures, syncope, facial asymmetry and headaches. Hematological: Negative. Psychiatric/Behavioral: Negative. Objective:     Vitals:    08/13/21 0725   BP: 102/72   Pulse: 72   Temp: 97.4 °F (36.3 °C)   SpO2: 96%       Body mass index is 36.44 kg/m². Physical Exam  Constitutional:       General: She is not in acute distress. Appearance: She is well-developed. She is obese. She is ill-appearing. She is not toxic-appearing or diaphoretic. HENT:      Head: Normocephalic and atraumatic. Right Ear: External ear normal.      Left Ear: External ear normal.      Nose: Nose normal.   Eyes:      General: No scleral icterus. Right eye: No discharge. Left eye: No discharge. Conjunctiva/sclera: Conjunctivae normal.      Pupils: Pupils are equal, round, and reactive to light. Neck:      Trachea: No tracheal deviation. Cardiovascular:      Rate and Rhythm: Normal rate and regular rhythm. Heart sounds: Normal heart sounds. No murmur heard. No friction rub. No gallop.     Pulmonary:      Effort: Pulmonary effort is normal. No tachypnea, accessory muscle usage or respiratory distress. Breath sounds: Normal breath sounds. No stridor. No decreased breath sounds, wheezing, rhonchi or rales. Abdominal:      General: Abdomen is protuberant. Bowel sounds are increased. There is no distension. Palpations: Abdomen is soft. There is no mass. Tenderness: There is no abdominal tenderness. There is no right CVA tenderness, left CVA tenderness, guarding or rebound. Hernia: No hernia is present. Musculoskeletal:         General: No tenderness or deformity. Normal range of motion. Cervical back: Normal range of motion and neck supple. Skin:     General: Skin is warm and dry. Coloration: Skin is not pale. Findings: No erythema or rash. Neurological:      Mental Status: She is alert and oriented to person, place, and time. GCS: GCS eye subscore is 4. GCS verbal subscore is 5. GCS motor subscore is 6. Gait: Gait is intact. Gait normal.   Psychiatric:         Speech: Speech normal.         Behavior: Behavior normal.         Thought Content: Thought content normal.         Judgment: Judgment normal.           Assessment:         1. Abdominal cramping    2. Diarrhea of presumed infectious origin        Plan:     1. Abdominal cramping    - CT ABDOMEN PELVIS W IV CONTRAST; Future  - CBC Auto Differential; Future  - Comprehensive Metabolic Panel; Future  - Lipase; Future  - Urinalysis   - Urine culture     DD: colitis, diverticulitis   STAT CT scan, repeat labs  I will call with results / plan of care  Follow-up 1 week  GI referral if above testing negative and sx persist     2. Diarrhea of presumed infectious origin    - CT ABDOMEN PELVIS W IV CONTRAST; Future    Previous stool studies & labs reviewed     Discussed use, benefit, and side effects of prescribed medications. All patient questions answered. Pt voiced understanding. Reviewed health maintenance. Instructed to continue current medications, diet and exercise. Patient agreedwith treatment plan. Follow up as directed.      Electronically signed by DESMOND Darling CNP on 8/13/2021

## 2021-08-14 LAB
CULTURE: NORMAL
Lab: NORMAL
SPECIMEN DESCRIPTION: NORMAL

## 2021-08-19 DIAGNOSIS — K83.8 COMMON BILE DUCT DILATION: Primary | ICD-10-CM

## 2021-08-19 DIAGNOSIS — R19.7 DIARRHEA, UNSPECIFIED TYPE: ICD-10-CM

## 2021-08-20 ENCOUNTER — TELEPHONE (OUTPATIENT)
Dept: GASTROENTEROLOGY | Age: 66
End: 2021-08-20

## 2021-08-23 ENCOUNTER — OFFICE VISIT (OUTPATIENT)
Dept: GASTROENTEROLOGY | Age: 66
End: 2021-08-23
Payer: MEDICARE

## 2021-08-23 VITALS
HEIGHT: 65 IN | DIASTOLIC BLOOD PRESSURE: 78 MMHG | SYSTOLIC BLOOD PRESSURE: 127 MMHG | HEART RATE: 81 BPM | TEMPERATURE: 97.9 F | BODY MASS INDEX: 35.47 KG/M2 | OXYGEN SATURATION: 93 % | WEIGHT: 212.9 LBS

## 2021-08-23 DIAGNOSIS — R19.7 DIARRHEA, UNSPECIFIED TYPE: ICD-10-CM

## 2021-08-23 DIAGNOSIS — K21.9 CHRONIC GERD: Primary | ICD-10-CM

## 2021-08-23 PROCEDURE — 1036F TOBACCO NON-USER: CPT | Performed by: INTERNAL MEDICINE

## 2021-08-23 PROCEDURE — 99204 OFFICE O/P NEW MOD 45 MIN: CPT | Performed by: INTERNAL MEDICINE

## 2021-08-23 PROCEDURE — 3017F COLORECTAL CA SCREEN DOC REV: CPT | Performed by: INTERNAL MEDICINE

## 2021-08-23 PROCEDURE — G8417 CALC BMI ABV UP PARAM F/U: HCPCS | Performed by: INTERNAL MEDICINE

## 2021-08-23 PROCEDURE — G8427 DOCREV CUR MEDS BY ELIG CLIN: HCPCS | Performed by: INTERNAL MEDICINE

## 2021-08-23 ASSESSMENT — ENCOUNTER SYMPTOMS
BACK PAIN: 0
COUGH: 0
SHORTNESS OF BREATH: 0
ABDOMINAL PAIN: 0
TROUBLE SWALLOWING: 0
BLOOD IN STOOL: 0
DIARRHEA: 1
VOICE CHANGE: 0
CONSTIPATION: 0
SORE THROAT: 0
ABDOMINAL DISTENTION: 1
ANAL BLEEDING: 0
NAUSEA: 0
RECTAL PAIN: 0
CHOKING: 0
VOMITING: 0

## 2021-08-23 NOTE — PROGRESS NOTES
lifestyle. She is not clear whether her stress regarding some of her symptoms. Patient never had colon examination in the past.    No other family members has GI malignancies. Past Medical,Family, and Social History reviewed and does contribute to the patient presentingcondition. Patient's PMH/PSH,SH,PSYCH Hx, MEDs, ALLERGIES, and ROS were all reviewed and updated in the appropriate sections. PAST MEDICAL HISTORY:  Past Medical History:   Diagnosis Date    A-fib [facility])     Anxiety     Atrial fibrillation ([facility] [address] 08/21/2018    Degenerative disc disease, lumbar 05/2018    Depression     Frequent UTI     Infertility, female     Osteoarthritis     Restless leg syndrome     Squamous cell carcinoma in situ     Thyroid nodule, Rt. Lobe. 27 mm     Urge incontinence 07/07/2020       Past Surgical History:   Procedure Laterality Date    CATARACT REMOVAL  2007    right    DILATION AND CURETTAGE  1994    EYE SURGERY      \" as a child\"    EYE SURGERY  2013    cateract removal    TONSILLECTOMY Bilateral 1963    TONSILLECTOMY AND ADENOIDECTOMY         CURRENT MEDICATIONS:    Current Outpatient Medications:     loperamide (IMODIUM) 2 MG capsule, Take 2 mg by mouth 4 times daily as needed for Diarrhea, Disp: , Rfl:     mirtazapine (REMERON) 7.5 MG tablet, Take 1 tablet by mouth nightly, Disp: 30 tablet, Rfl: 0    temazepam (RESTORIL) 15 MG capsule, TAKE 1 CAPSULE BY MOUTH AT BEDTIME, Disp: , Rfl: 2    LYRICA 150 MG capsule, Take 1 capsule by mouth 2 times daily. , Disp: , Rfl: 5    aspirin 81 MG tablet, Take 81 mg by mouth, Disp: , Rfl:     rOPINIRole (REQUIP) 1 MG tablet, Take 1 tablet by mouth 4 times daily, Disp: 90 tablet, Rfl: 3    Multiple Vitamin (MULTIVITAMIN) tablet, Take 1 tablet by mouth daily, Disp: 30 tablet, Rfl: 3    ALLERGIES:   Allergies   Allergen Reactions    Pcn [Penicillins] Other (See Comments)     \" as a child\"      Neomycin Rash    Penicillin G Rash       FAMILY HISTORY: Problem Relation Age of Onset    Cancer Mother         pancreatic    Cancer Father     Arthritis Sister     Hypertension Mother     Osteoporosis Mother     Diabetes Paternal Grandmother     Lung Cancer Father          SOCIAL HISTORY:   Social History     Socioeconomic History    Marital status: Single     Spouse name: Not on file    Number of children: Not on file    Years of education: 15    Highest education level: Not on file   Occupational History    Occupation: [address] [employer]     Employer: [employer]   Tobacco Use    Smoking status: Former Smoker     Packs/day: 2.00     Years: 3.00     Pack years: 6.00     Types: Cigarettes     Quit date: 2021     Years since quittin.2    Smokeless tobacco: Never Used    Tobacco comment: quit 2 months ago, started again   Vaping Use    Vaping Use: Former   Substance and Sexual Activity    Alcohol use: No     Comment: rarely    Drug use: No    Sexual activity: Never     Partners: Male   Other Topics Concern    Not on file   Social History Narrative    ** Merged History Encounter **          Social Determinants of Health     Financial Resource Strain: Low Risk     Difficulty of Paying Living Expenses: Not hard at all   Food Insecurity: No Food Insecurity    Worried About Running Out of Food in the Last Year: Never true    Pedro of Food in the Last Year: Never true   Transportation Needs: No Transportation Needs    Lack of Transportation (Medical): No    Lack of Transportation (Non-Medical):  No   Physical Activity:     Days of Exercise per Week:     Minutes of Exercise per Session:    Stress:     Feeling of Stress :    Social Connections:     Frequency of Communication with Friends and Family:     Frequency of Social Gatherings with Friends and Family:     Attends Cheondo[employer] Services:     Active Member of Clubs or Organizations:     Attends Club or Organization Meetings:     Marital Status:    Intimate Partner Violence:     Fear of Current or Ex-Partner:     Emotionally Abused:     Physically Abused:     Sexually Abused:        REVIEW OF SYSTEMS:       Review of Systems   Constitutional: Positive for appetite change, fatigue and unexpected weight change. HENT: Negative for sore throat, trouble swallowing and voice change. Eyes: Negative for visual disturbance. Respiratory: Negative for cough, choking and shortness of breath. Cardiovascular: Negative for chest pain and leg swelling. Gastrointestinal: Positive for abdominal distention and diarrhea (not since thursday the 19th). Negative for abdominal pain, anal bleeding, blood in stool, constipation, nausea, rectal pain and vomiting. Genitourinary: Negative for difficulty urinating. Musculoskeletal: Negative for back pain, joint swelling and myalgias. Neurological: Positive for dizziness and weakness. Negative for tremors, light-headedness, numbness and headaches. Hematological: Bruises/bleeds easily. Psychiatric/Behavioral: Negative for sleep disturbance. The patient is not nervous/anxious. PHYSICAL EXAMINATION: Vital signs reviewed per the nursing documentation. /78   Pulse 81   Temp 97.9 °F (36.6 °C)   Ht 5' 5\" (1.651 m)   Wt 212 lb 14.4 oz (96.6 kg)   SpO2 93%   BMI 35.43 kg/m²   Body mass index is 35.43 kg/m². Physical Exam  Vitals and nursing note reviewed. Constitutional:       Appearance: She is well-developed. Comments: Moderately overweight patient. HENT:      Head: Normocephalic and atraumatic. Eyes:      General: No scleral icterus. Conjunctiva/sclera: Conjunctivae normal.      Pupils: Pupils are equal, round, and reactive to light. Neck:      Thyroid: No thyromegaly. Vascular: No hepatojugular reflux or JVD. Trachea: No tracheal deviation. Cardiovascular:      Rate and Rhythm: Normal rate and regular rhythm. Heart sounds: Normal heart sounds.    Pulmonary:      Effort: Pulmonary effort is normal. No respiratory distress. Breath sounds: Normal breath sounds. No wheezing or rales. Abdominal:      General: Bowel sounds are normal. There is no distension. Palpations: Abdomen is soft. There is no hepatomegaly or mass. Tenderness: There is no abdominal tenderness. There is no rebound. Hernia: No hernia is present. Comments: Has obese abdomen and difficult to examine the patient satisfactorily. Musculoskeletal:         General: No tenderness. Cervical back: Normal range of motion and neck supple. Comments: No joint swelling   Lymphadenopathy:      Cervical: No cervical adenopathy. Skin:     General: Skin is warm. Findings: No bruising, ecchymosis, erythema or rash. Neurological:      Mental Status: She is alert and oriented to person, place, and time. Cranial Nerves: No cranial nerve deficit. Psychiatric:         Thought Content:  Thought content normal.           LABORATORY DATA: Reviewed  Lab Results   Component Value Date    WBC 8.8 08/13/2021    HGB 15.5 08/13/2021    HCT 46.8 (H) 08/13/2021    MCV 96.1 08/13/2021     08/13/2021     08/13/2021    K 5.2 08/13/2021     08/13/2021    CO2 26 08/13/2021    BUN 13 08/13/2021    CREATININE 0.60 08/13/2021    LABPROT 6.4 03/21/2012    LABALBU 4.4 08/13/2021    BILITOT 0.20 (L) 08/13/2021    ALKPHOS 87 08/13/2021    AST 26 08/13/2021    ALT 18 08/13/2021    INR 1.1 08/21/2018         Lab Results   Component Value Date    RBC 4.87 08/13/2021    HGB 15.5 08/13/2021    MCV 96.1 08/13/2021    MCH 32.0 08/13/2021    MCHC 33.2 08/13/2021    RDW 13.5 08/13/2021    MPV 9.2 08/13/2021    BASOPCT 1 08/13/2021    LYMPHSABS 2.20 08/13/2021    MONOSABS 0.60 08/13/2021    NEUTROABS 5.90 08/13/2021    EOSABS 0.10 08/13/2021    BASOSABS 0.00 08/13/2021         DIAGNOSTIC TESTING:     CT ABDOMEN PELVIS W IV CONTRAST    Result Date: 8/13/2021  EXAMINATION: CT OF THE ABDOMEN AND PELVIS WITH CONTRAST 8/13/2021 8:45 am negative. She is advised to have testing for celiac disease, pancreatic elastase, sed rate. Also she may need EGD and colonoscopy to further evaluate her symptoms and management. After discussion, patient understood and agreed. She is explained regarding adequate colon preparation, risks and benefits of the procedure. She verbalized the consent. Spent 30 minutes providing patient education and counseling. Thank you for allowing me to participate in the care of Ms. Arora. For any further questions please do not hesitate to contact me. Note is dictated utilizing voice recognition software. Unfortunately this leads to occasional typographical errors. Please contact our office if you have any questions. I have reviewed and agree with the MA/LPN ROS.      Norman Kirk MD, Gisselle Cuevas [facility]  Board Certified in Gastroenterology and [address] [place] [address] Gastroenterology  Office #: [phone]

## 2021-08-24 ENCOUNTER — HOSPITAL ENCOUNTER (OUTPATIENT)
Age: 66
Setting detail: SPECIMEN
Discharge: HOME | End: 2021-08-24
Payer: MEDICARE

## 2021-08-24 DIAGNOSIS — R19.7 DIARRHEA, UNSPECIFIED TYPE: ICD-10-CM

## 2021-08-24 LAB
SEDIMENTATION RATE, ERYTHROCYTE: 20 MM (ref 0–30)
TSH SERPL DL<=0.05 MIU/L-ACNC: 1.21 MIU/L (ref 0.3–5)

## 2021-08-25 ENCOUNTER — HOSPITAL ENCOUNTER (OUTPATIENT)
Age: 66
Setting detail: SPECIMEN
Discharge: HOME | End: 2021-08-25
Payer: MEDICARE

## 2021-08-25 DIAGNOSIS — R19.7 DIARRHEA, UNSPECIFIED TYPE: ICD-10-CM

## 2021-08-25 LAB — TISSUE TRANSGLUTAMINASE IGA: 0.1 U/ML

## 2021-08-26 RX ORDER — POLYETHYLENE GLYCOL 3350 17 G/17G
POWDER, FOR SOLUTION ORAL
Qty: 238 G | Refills: 0 | Status: SHIPPED | OUTPATIENT
Start: 2021-08-26 | End: 2021-12-13

## 2021-08-26 RX ORDER — BISACODYL 5 MG
TABLET, DELAYED RELEASE (ENTERIC COATED) ORAL
Qty: 2 TABLET | Refills: 0 | Status: SHIPPED | OUTPATIENT
Start: 2021-08-26 | End: 2021-12-13

## 2021-08-28 LAB — FECAL PANCREATIC ELASTASE-1: 310 UG/G

## 2021-08-31 ENCOUNTER — TELEPHONE (OUTPATIENT)
Dept: FAMILY MEDICINE CLINIC | Age: 66
End: 2021-08-31

## 2021-08-31 ENCOUNTER — TELEPHONE (OUTPATIENT)
Dept: GASTROENTEROLOGY | Age: 66
End: 2021-08-31

## 2021-08-31 NOTE — TELEPHONE ENCOUNTER
Pt had labs and a stool sample done through Dr Ann Leija. She says she tried to call for the results and was told by Dr Alejandro Parks office that they do not  Give test results over the phone and the first appt she could get is in October. She is trying to find out  If she has celliac disease or not.   Pt is very frustrated and hoping she can get some info, now she is crying

## 2021-08-31 NOTE — TELEPHONE ENCOUNTER
I don't see anything outside the normal reference range however she will need to further discuss with the ordering provider

## 2021-09-08 NOTE — TELEPHONE ENCOUNTER
called the patient on 9/8/21. The voice mail was a personl one therefore writer left a detail message letting the patient know that her results were all negative and all in normal range.  also told the patient if her had any other questions or concerns she can call the office at [phone] option 4 option 4.

## 2021-09-09 ENCOUNTER — OFFICE VISIT (OUTPATIENT)
Dept: NEUROLOGY | Age: 66
End: 2021-09-09
Payer: MEDICARE

## 2021-09-09 VITALS
BODY MASS INDEX: 35.25 KG/M2 | HEIGHT: 65 IN | WEIGHT: 211.6 LBS | SYSTOLIC BLOOD PRESSURE: 134 MMHG | HEART RATE: 86 BPM | DIASTOLIC BLOOD PRESSURE: 84 MMHG

## 2021-09-09 DIAGNOSIS — G25.81 RLS (RESTLESS LEGS SYNDROME): ICD-10-CM

## 2021-09-09 DIAGNOSIS — G43.019 INTRACTABLE MIGRAINE WITHOUT AURA AND WITHOUT STATUS MIGRAINOSUS: Primary | ICD-10-CM

## 2021-09-09 PROCEDURE — G8417 CALC BMI ABV UP PARAM F/U: HCPCS | Performed by: PSYCHIATRY & NEUROLOGY

## 2021-09-09 PROCEDURE — 1036F TOBACCO NON-USER: CPT | Performed by: PSYCHIATRY & NEUROLOGY

## 2021-09-09 PROCEDURE — 99214 OFFICE O/P EST MOD 30 MIN: CPT | Performed by: PSYCHIATRY & NEUROLOGY

## 2021-09-09 PROCEDURE — G8427 DOCREV CUR MEDS BY ELIG CLIN: HCPCS | Performed by: PSYCHIATRY & NEUROLOGY

## 2021-09-09 PROCEDURE — 3017F COLORECTAL CA SCREEN DOC REV: CPT | Performed by: PSYCHIATRY & NEUROLOGY

## 2021-09-09 RX ORDER — BUTALBITAL, ACETAMINOPHEN AND CAFFEINE 50; 325; 40 MG/1; MG/1; MG/1
TABLET ORAL
Qty: 40 TABLET | Refills: 0 | Status: SHIPPED | OUTPATIENT
Start: 2021-09-09 | End: 2021-11-22 | Stop reason: SDUPTHER

## 2021-09-09 NOTE — PROGRESS NOTES
NEUROLOGY PROGRESS NOTES   Patient Name:       Bogdan Restrepo  :        1955  Clinic Visit Date:    2021        Dear Dr. Lorraine Jimenez, APRN - CNP     I saw Ms. Bogdan Restrepo in follow-up in the office today in continuation of neurologic care. As you know she  is a 61 y.o. right handed  female initially seen in neurology consultation in 2019 for evaluation of headaches. She has not kept any follow-up visits until now. Today is the first follow-up visit after prolonged absence of 2 years duration. She has not kept her appointments earlier as headaches were much better until few weeks ago. She comes to clinic stating that she has been having moderately severe throbbing headaches without aura, located in right supraorbital region occurring at least twice a week lasting for couple of hours. She had associated photophobia, phonophobia. Denied nausea and vomiting. Denied associated numbness or weakness. She has been taking Excedrin, ibuprofen twice weekly with mild relief. She was on verapamil in the past reporting \"verapamil worked very well\". She has not been on it. She also stated that she has a long-standing history of restless legs for which she was on Requip with significant relief. She also has depression for which she has been on mirtazapine. She also takes temazepam nightly for insomnia related to restless legs. Review of systems done by staff reviewed and pertinent positives include headaches with light sensitivity as stated above.      Current Outpatient Medications on File Prior to Visit   Medication Sig Dispense Refill    loperamide (IMODIUM) 2 MG capsule Take 2 mg by mouth 4 times daily as needed for Diarrhea      mirtazapine (REMERON) 7.5 MG tablet Take 1 tablet by mouth nightly (Patient taking differently: Take 15 mg by mouth nightly ) 30 tablet 0    temazepam (RESTORIL) 15 MG capsule TAKE 1 CAPSULE BY MOUTH AT BEDTIME  2    LYRICA 75 MG capsule Take 1 capsule by mouth daily. 5    aspirin 81 MG tablet Take 81 mg by mouth      rOPINIRole (REQUIP) 1 MG tablet Take 1 tablet by mouth 4 times daily 90 tablet 3    Multiple Vitamin (MULTIVITAMIN) tablet Take 1 tablet by mouth daily 30 tablet 3    polyethylene glycol (GLYCOLAX) 17 GM/SCOOP powder Follow instructions provided to you from physician's office. (Patient not taking: Reported on 9/9/2021) 238 g 0    bisacodyl (BISACODYL) 5 MG EC tablet Follow instructions provided given by the physician's office. (Patient not taking: Reported on 9/9/2021) 2 tablet 0     No current facility-administered medications on file prior to visit. Allergies: Wilfred Alaniz is allergic to pcn [penicillins], neomycin, and penicillin g. Past Medical History:   Diagnosis Date    A-fib [facility])     Anxiety     Atrial fibrillation (H[facility]) 08/21/2018    Degenerative disc disease, lumbar 05/2018    Depression     Frequent UTI     Infertility, female     Osteoarthritis     Restless leg syndrome     Squamous cell carcinoma in situ     Thyroid nodule, Rt. Lobe. 27 mm     Urge incontinence 07/07/2020       Past Surgical History:   Procedure Laterality Date    CATARACT REMOVAL  2007    right    DILATION AND CURETTAGE  1994    EYE SURGERY      \" as a child\"    EYE SURGERY  2013    cateract removal    TONSILLECTOMY Bilateral 1963    TONSILLECTOMY AND ADENOIDECTOMY       Social History: Wilfred Alaniz  reports that she quit smoking about 3 months ago. Her smoking use included cigarettes. She has a 6.00 pack-year smoking history. She has never used smokeless tobacco. She reports that she does not drink alcohol and does not use drugs.     Family History   Problem Relation Age of Onset    Cancer Mother         pancreatic    Cancer Father     Arthritis Sister     Hypertension Mother     Osteoporosis Mother     Diabetes Paternal Grandmother     Lung Cancer Father        On exam: Blood pressure 134/84, pulse 86, height 5' 5\" (1.651 affective disorder: On mirtazapine and temazepam as per psych. Follow-up in clinic in 2-3 months. Please note that portions of this note were completed with a voice recognition program.  Although every effort was made to ensure the accuracy of this automated transcription, some errors in transcription may have occurred; occasionally words are mis-transcribed. Thank you for understanding.

## 2021-09-09 NOTE — PROGRESS NOTES
Constitutional [] Weight loss/gain   [] Fatigue  [] Fever/Chills   HEENT [] Hearing Loss  [] Visual Disturbance  [] Tinnitus  [] Eye pain   Respiratory [] Shortness of Breath  [] Cough  [] Snoring   Cardiovascular [] Chest Pain  [] Palpitations  [] Lightheaded   GI [] Constipation  [] Diarrhea  [] Swallowing change  [] Nausea/vomiting    [] Urinary Frequency  [] Urinary Urgency   Musculoskeletal [] Neck pain  [] Back pain  [] Muscle pain  [] Restless legs   Dermatologic [] Skin changes   Neurologic [] Memory loss/confusion  [] Seizures  [] Trouble walking or imbalance  [] Dizziness  [] Sleep disturbance  [] Weakness  [] Numbness  [] Tremors  [] Speech Difficulty  [x] Headaches  [x] Light Sensitivity  [] Sound Sensitivity   Endocrinology []Excessive thirst  []Excessive hunger   Psychiatric [] Anxiety/Depression  [] Hallucination   Allergy/immunology []Hives/environmental allergies   Hematologic/lymph [] Abnormal bleeding  [] Abnormal bruising

## 2021-09-14 ENCOUNTER — TELEPHONE (OUTPATIENT)
Dept: GASTROENTEROLOGY | Age: 66
End: 2021-09-14

## 2021-09-14 NOTE — TELEPHONE ENCOUNTER
Patient called office to make sure that today's appointment was cancelled. Advised that it was. Writer thanked and call ended.

## 2021-09-30 ENCOUNTER — ANESTHESIA EVENT (OUTPATIENT)
Dept: OPERATING ROOM | Age: 66
End: 2021-09-30
Payer: MEDICARE

## 2021-09-30 ENCOUNTER — HOSPITAL ENCOUNTER (OUTPATIENT)
Age: 66
Setting detail: OUTPATIENT SURGERY
Discharge: HOME | End: 2021-09-30
Attending: INTERNAL MEDICINE | Admitting: INTERNAL MEDICINE
Payer: MEDICARE

## 2021-09-30 ENCOUNTER — ANESTHESIA (OUTPATIENT)
Dept: OPERATING ROOM | Age: 66
End: 2021-09-30
Payer: MEDICARE

## 2021-09-30 VITALS
SYSTOLIC BLOOD PRESSURE: 108 MMHG | DIASTOLIC BLOOD PRESSURE: 66 MMHG | OXYGEN SATURATION: 96 % | BODY MASS INDEX: 34.32 KG/M2 | HEART RATE: 65 BPM | HEIGHT: 65 IN | TEMPERATURE: 97.2 F | WEIGHT: 206 LBS | RESPIRATION RATE: 14 BRPM

## 2021-09-30 VITALS — SYSTOLIC BLOOD PRESSURE: 128 MMHG | DIASTOLIC BLOOD PRESSURE: 68 MMHG | OXYGEN SATURATION: 98 %

## 2021-09-30 PROCEDURE — 88305 TISSUE EXAM BY PATHOLOGIST: CPT

## 2021-09-30 PROCEDURE — 3609017100 HC EGD: Performed by: INTERNAL MEDICINE

## 2021-09-30 PROCEDURE — 45380 COLONOSCOPY AND BIOPSY: CPT | Performed by: INTERNAL MEDICINE

## 2021-09-30 PROCEDURE — 2580000003 HC RX 258: Performed by: ANESTHESIOLOGY

## 2021-09-30 PROCEDURE — 3700000000 HC ANESTHESIA ATTENDED CARE: Performed by: INTERNAL MEDICINE

## 2021-09-30 PROCEDURE — 2500000003 HC RX 250 WO HCPCS: Performed by: NURSE ANESTHETIST, CERTIFIED REGISTERED

## 2021-09-30 PROCEDURE — 2709999900 HC NON-CHARGEABLE SUPPLY: Performed by: INTERNAL MEDICINE

## 2021-09-30 PROCEDURE — 6360000002 HC RX W HCPCS: Performed by: NURSE ANESTHETIST, CERTIFIED REGISTERED

## 2021-09-30 PROCEDURE — 43239 EGD BIOPSY SINGLE/MULTIPLE: CPT | Performed by: INTERNAL MEDICINE

## 2021-09-30 PROCEDURE — 7100000011 HC PHASE II RECOVERY - ADDTL 15 MIN: Performed by: INTERNAL MEDICINE

## 2021-09-30 PROCEDURE — 3609027000 HC COLONOSCOPY: Performed by: INTERNAL MEDICINE

## 2021-09-30 PROCEDURE — 7100000010 HC PHASE II RECOVERY - FIRST 15 MIN: Performed by: INTERNAL MEDICINE

## 2021-09-30 PROCEDURE — 88312 SPECIAL STAINS GROUP 1: CPT

## 2021-09-30 PROCEDURE — 3700000001 HC ADD 15 MINUTES (ANESTHESIA): Performed by: INTERNAL MEDICINE

## 2021-09-30 RX ORDER — FLUCONAZOLE 100 MG/1
100 TABLET ORAL DAILY
Qty: 10 TABLET | Refills: 0 | Status: SHIPPED | OUTPATIENT
Start: 2021-09-30 | End: 2021-10-10

## 2021-09-30 RX ORDER — SODIUM CHLORIDE 0.9 % (FLUSH) 0.9 %
10 SYRINGE (ML) INJECTION EVERY 12 HOURS SCHEDULED
Status: DISCONTINUED | OUTPATIENT
Start: 2021-09-30 | End: 2021-09-30 | Stop reason: HOSPADM

## 2021-09-30 RX ORDER — LIDOCAINE HYDROCHLORIDE 10 MG/ML
1 INJECTION, SOLUTION EPIDURAL; INFILTRATION; INTRACAUDAL; PERINEURAL
Status: DISCONTINUED | OUTPATIENT
Start: 2021-09-30 | End: 2021-09-30 | Stop reason: HOSPADM

## 2021-09-30 RX ORDER — SODIUM CHLORIDE, SODIUM LACTATE, POTASSIUM CHLORIDE, CALCIUM CHLORIDE 600; 310; 30; 20 MG/100ML; MG/100ML; MG/100ML; MG/100ML
INJECTION, SOLUTION INTRAVENOUS CONTINUOUS
Status: DISCONTINUED | OUTPATIENT
Start: 2021-09-30 | End: 2021-09-30 | Stop reason: HOSPADM

## 2021-09-30 RX ORDER — ONDANSETRON 2 MG/ML
4 INJECTION INTRAMUSCULAR; INTRAVENOUS
Status: DISCONTINUED | OUTPATIENT
Start: 2021-09-30 | End: 2021-09-30 | Stop reason: HOSPADM

## 2021-09-30 RX ORDER — SODIUM CHLORIDE 0.9 % (FLUSH) 0.9 %
10 SYRINGE (ML) INJECTION PRN
Status: DISCONTINUED | OUTPATIENT
Start: 2021-09-30 | End: 2021-09-30 | Stop reason: HOSPADM

## 2021-09-30 RX ORDER — SODIUM CHLORIDE 9 MG/ML
INJECTION, SOLUTION INTRAVENOUS CONTINUOUS
Status: DISCONTINUED | OUTPATIENT
Start: 2021-09-30 | End: 2021-09-30

## 2021-09-30 RX ORDER — LIDOCAINE HYDROCHLORIDE 20 MG/ML
INJECTION, SOLUTION EPIDURAL; INFILTRATION; INTRACAUDAL; PERINEURAL PRN
Status: DISCONTINUED | OUTPATIENT
Start: 2021-09-30 | End: 2021-09-30 | Stop reason: SDUPTHER

## 2021-09-30 RX ORDER — SODIUM CHLORIDE 9 MG/ML
25 INJECTION, SOLUTION INTRAVENOUS PRN
Status: DISCONTINUED | OUTPATIENT
Start: 2021-09-30 | End: 2021-09-30 | Stop reason: HOSPADM

## 2021-09-30 RX ORDER — FENTANYL CITRATE 50 UG/ML
25 INJECTION, SOLUTION INTRAMUSCULAR; INTRAVENOUS EVERY 5 MIN PRN
Status: DISCONTINUED | OUTPATIENT
Start: 2021-09-30 | End: 2021-09-30 | Stop reason: HOSPADM

## 2021-09-30 RX ORDER — HYDROMORPHONE HYDROCHLORIDE 1 MG/ML
0.25 INJECTION, SOLUTION INTRAMUSCULAR; INTRAVENOUS; SUBCUTANEOUS EVERY 5 MIN PRN
Status: DISCONTINUED | OUTPATIENT
Start: 2021-09-30 | End: 2021-09-30 | Stop reason: HOSPADM

## 2021-09-30 RX ORDER — PROPOFOL 10 MG/ML
INJECTION, EMULSION INTRAVENOUS PRN
Status: DISCONTINUED | OUTPATIENT
Start: 2021-09-30 | End: 2021-09-30 | Stop reason: SDUPTHER

## 2021-09-30 RX ADMIN — PROPOFOL 50 MG: 10 INJECTION, EMULSION INTRAVENOUS at 08:43

## 2021-09-30 RX ADMIN — PROPOFOL 20 MG: 10 INJECTION, EMULSION INTRAVENOUS at 08:24

## 2021-09-30 RX ADMIN — PROPOFOL 20 MG: 10 INJECTION, EMULSION INTRAVENOUS at 08:29

## 2021-09-30 RX ADMIN — PROPOFOL 20 MG: 10 INJECTION, EMULSION INTRAVENOUS at 08:42

## 2021-09-30 RX ADMIN — PROPOFOL 20 MG: 10 INJECTION, EMULSION INTRAVENOUS at 08:39

## 2021-09-30 RX ADMIN — PROPOFOL 50 MG: 10 INJECTION, EMULSION INTRAVENOUS at 08:21

## 2021-09-30 RX ADMIN — PROPOFOL 30 MG: 10 INJECTION, EMULSION INTRAVENOUS at 08:20

## 2021-09-30 RX ADMIN — PROPOFOL 20 MG: 10 INJECTION, EMULSION INTRAVENOUS at 08:27

## 2021-09-30 RX ADMIN — PROPOFOL 50 MG: 10 INJECTION, EMULSION INTRAVENOUS at 08:32

## 2021-09-30 RX ADMIN — PROPOFOL 20 MG: 10 INJECTION, EMULSION INTRAVENOUS at 08:31

## 2021-09-30 RX ADMIN — PROPOFOL 50 MG: 10 INJECTION, EMULSION INTRAVENOUS at 08:47

## 2021-09-30 RX ADMIN — PROPOFOL 50 MG: 10 INJECTION, EMULSION INTRAVENOUS at 08:19

## 2021-09-30 RX ADMIN — PROPOFOL 40 MG: 10 INJECTION, EMULSION INTRAVENOUS at 08:37

## 2021-09-30 RX ADMIN — SODIUM CHLORIDE, POTASSIUM CHLORIDE, SODIUM LACTATE AND CALCIUM CHLORIDE: 600; 310; 30; 20 INJECTION, SOLUTION INTRAVENOUS at 07:06

## 2021-09-30 RX ADMIN — LIDOCAINE HYDROCHLORIDE 100 MG: 20 INJECTION, SOLUTION EPIDURAL; INFILTRATION; INTRACAUDAL; PERINEURAL at 08:19

## 2021-09-30 ASSESSMENT — PULMONARY FUNCTION TESTS
PIF_VALUE: 1
PIF_VALUE: 1
PIF_VALUE: 2
PIF_VALUE: 1

## 2021-09-30 ASSESSMENT — LIFESTYLE VARIABLES: SMOKING_STATUS: 0

## 2021-09-30 ASSESSMENT — PAIN SCALES - GENERAL
PAINLEVEL_OUTOF10: 0

## 2021-09-30 ASSESSMENT — PAIN - FUNCTIONAL ASSESSMENT: PAIN_FUNCTIONAL_ASSESSMENT: 0-10

## 2021-09-30 NOTE — ANESTHESIA PRE PROCEDURE
Department of Anesthesiology  Preprocedure Note       Name:  Paola Vazquez   Age:  61 y.o.  :  1955                                          MRN:  [id number]         Date:  2021      Surgeon: Erin Hymanor):  Gissell Frankel MD    Procedure: Procedure(s):  EGD ESOPHAGOGASTRODUODENOSCOPY  COLONOSCOPY DIAGNOSTIC    Medications prior to admission:   Prior to Admission medications    Medication Sig Start Date End Date Taking? Authorizing Provider   polyethylene glycol (GLYCOLAX) 17 GM/SCOOP powder Follow instructions provided to you from physician's office. 21  Yes Gissell Frankel MD   bisacodyl (BISACODYL) 5 MG EC tablet Follow instructions provided given by the physician's office. 21  Yes Gissell Frankel MD   rOPINIRole (REQUIP) 1 MG tablet Take 1 tablet by mouth 4 times daily 18  Yes Kenney Miller MD   verapamil (CALAN SR) 120 MG extended release tablet Take 1 tablet by mouth daily 21   Theo Wilson MD   butalbital-acetaminophen-caffeine (FIORICET, ESGIC) -17 MG per tablet Take one tab every other day as needed for severe headache 21   Theo Wilson MD   loperamide (IMODIUM) 2 MG capsule Take 2 mg by mouth 4 times daily as needed for Diarrhea    Historical Provider, MD   mirtazapine (REMERON) 7.5 MG tablet Take 1 tablet by mouth nightly  Patient taking differently: Take 15 mg by mouth nightly  21   [facility], APRN - [facility]   temazepam (RESTORIL) 15 MG capsule TAKE 1 CAPSULE BY MOUTH AT BEDTIME 10/2/19   Historical Provider, MD   LYRICA 75 MG capsule Take 1 capsule by mouth daily.   19   Historical Provider, MD   aspirin 81 MG tablet Take 81 mg by mouth 16   Historical Provider, MD   Multiple Vitamin (MULTIVITAMIN) tablet Take 1 tablet by mouth daily 18   Kenney Miller MD       Current medications:    Current Facility-Administered Medications   Medication Dose Route Frequency Provider Last Rate Last Admin    0.9 % sodium chloride infusion   IntraVENous Continuous Ndal Guss Najjar, MD        lactated ringers infusion   IntraVENous Continuous Hesham Tan  mL/hr at 09/30/21 0706 New Bag at 09/30/21 0706    sodium chloride flush 0.9 % injection 10 mL  10 mL IntraVENous 2 times per day Hesham Tan MD        sodium chloride flush 0.9 % injection 10 mL  10 mL IntraVENous PRN Hesham Tan MD        0.9 % sodium chloride infusion  25 mL IntraVENous PRN Ndal Guss Najjar, MD        lidocaine PF 1 % injection 1 mL  1 mL IntraDERmal Once PRN Hesham Tan MD           Allergies: Allergies   Allergen Reactions    Pcn [Penicillins] Other (See Comments)     \" as a child\"      Neomycin Rash    Penicillin G Rash       Problem List:    Patient Active Problem List   Diagnosis Code    Current smoker F17.200    Mixed anxiety depressive disorder F41.8    RLS (restless legs syndrome) G25.81    Sleep disturbance G47.9    Thyroid nodule, Rt. Lobe. 27 mm E04.1    Atrial fibrillation (HCC) I48.91    Depression F32.9    Urge incontinence N39.41    S/P ablation of atrial fibrillation Z98.890, Z86.79    IFG (impaired fasting glucose) R73.01    Dilation of biliary tract K83.8       Past Medical History:        Diagnosis Date    [facility]fib ([facility]     Anxiety     Atrial fibrillation ([facility] 08/21/2018    Degenerative disc disease, lumbar 05/2018    Depression     Frequent UTI     Infertility, female     Osteoarthritis     Restless leg syndrome     Squamous cell carcinoma in situ     Thyroid nodule, Rt. Lobe.  27 mm     Urge incontinence 07/07/2020       Past Surgical History:        Procedure Laterality Date    CATARACT REMOVAL  2007    right    DILATION AND CURETTAGE  1994    EYE SURGERY      \" as a child\"    EYE SURGERY  2013    cateract removal    TONSILLECTOMY Bilateral 1963    TONSILLECTOMY AND ADENOIDECTOMY         Social History:    Social History     Tobacco Use    Smoking status: Former Smoker     Packs/day: 2.00 Years: 3.00     Pack years: 6.00     Types: Cigarettes     Quit date: 2021     Years since quittin.3    Smokeless tobacco: Never Used    Tobacco comment: quit 2 months ago, started again   Substance Use Topics    Alcohol use: No     Comment: rarely                                Counseling given: Not Answered  Comment: quit 2 months ago, started again      Vital Signs (Current):   Vitals:    21 0639 21 0642   BP:  130/73   Pulse:  70   Resp:  16   Temp:  96.8 °F (36 °C)   TempSrc:  Temporal   SpO2:  92%   Weight: 206 lb (93.4 kg)    Height: 5' 5\" (1.651 m)                                               BP Readings from Last 3 Encounters:   21 130/73   21 134/84   21 127/78       NPO Status: Time of last liquid consumption:                         Time of last solid consumption:                         Date of last liquid consumption: 21                        Date of last solid food consumption: 21    BMI:   Wt Readings from Last 3 Encounters:   21 206 lb (93.4 kg)   21 211 lb 9.6 oz (96 kg)   21 212 lb 14.4 oz (96.6 kg)     Body mass index is 34.28 kg/m².     CBC:   Lab Results   Component Value Date    WBC 8.8 2021    RBC 4.87 2021    RBC 4.11 2012    HGB 15.5 2021    HCT 46.8 2021    MCV 96.1 2021    RDW 13.5 2021     2021     2012       CMP:   Lab Results   Component Value Date     2021    K 5.2 2021     2021    CO2 26 2021    BUN 13 2021    CREATININE 0.60 2021    GFRAA >60 2021    LABGLOM >60 2021    GLUCOSE 99 2021    GLUCOSE 90 2012    PROT 7.3 2021    CALCIUM 9.8 2021    BILITOT 0.20 2021    ALKPHOS 87 2021    AST 26 2021    ALT 18 2021       POC Tests: No results for input(s): POCGLU, POCNA, POCK, POCCL, POCBUN, POCHEMO, POCHCT in the last 72 hours.    Coags:   Lab Results   Component Value Date    PROTIME 11.8 08/21/2018    INR 1.1 08/21/2018    APTT 23.3 08/21/2018       HCG (If Applicable): No results found for: PREGTESTUR, PREGSERUM, HCG, HCGQUANT     ABGs: No results found for: PHART, PO2ART, HKN7AAD, JOI9COZ, BEART, V6RVXVHW     Type & Screen (If Applicable):  No results found for: LABABO, LABRH    Drug/Infectious Status (If Applicable):  No results found for: HIV, HEPCAB    COVID-19 Screening (If Applicable): No results found for: COVID19        Anesthesia Evaluation   no history of anesthetic complications:   Airway: Mallampati: III  TM distance: >3 FB   Neck ROM: full  Mouth opening: < 3 FB Dental:          Pulmonary:normal exam        (-) not a current smoker                           Cardiovascular:  Exercise tolerance: good (>4 METS),   (+) dysrhythmias (s/p ablation): atrial fibrillation,                   Neuro/Psych:   (+) depression/anxiety             GI/Hepatic/Renal:   (+) GERD (occ):, morbid obesity          Endo/Other:                     Abdominal:             Vascular: Other Findings:           Anesthesia Plan      TIVA     ASA 2       Induction: intravenous. MIPS: Prophylactic antiemetics administered. Anesthetic plan and risks discussed with patient. Plan discussed with CRNA.     Attending anesthesiologist reviewed and agrees with Preprocedure content              Rogerio Hall MD   9/30/2021

## 2021-09-30 NOTE — ANESTHESIA POSTPROCEDURE EVALUATION
Department of Anesthesiology  Postprocedure Note    Patient: Fernando Negrete  MRN: [id number]  YOB: 1955  Date of evaluation: 9/30/2021  Time:  11:31 AM     Procedure Summary     Date: 09/30/21 Room / Location: [facility] Allison  / [facility]    Anesthesia Start: 1422 Anesthesia Stop: 3047    Procedures:       EGD ESOPHAGOGASTRODUODENOSCOPY (N/A Mouth)      COLONOSCOPY DIAGNOSTIC WITH BIOPSY (N/A Anus) Diagnosis: (DX CHRONIC GERD           DIARRHEA)    Surgeons: Adriana Gutierrez MD Responsible Provider: Sarah Toro MD    Anesthesia Type: MAC ASA Status: 2          Anesthesia Type: MAC    Regan Phase I:      Regan Phase II:      Last vitals: Reviewed and per EMR flowsheets.        Anesthesia Post Evaluation    Patient location during evaluation: PACU  Patient participation: complete - patient participated  Level of consciousness: awake  Airway patency: patent  Nausea & Vomiting: no nausea  Complications: no  Cardiovascular status: blood pressure returned to baseline  Respiratory status: acceptable  Hydration status: euvolemic

## 2021-09-30 NOTE — OP NOTE
ESOPHAGOGASTRODUODENOSCOPY   ( EGD )  DATE OF PROCEDURE: 9/30/2021     SURGEON: Vitaliy Clark MD    ASSISTANT: None    PREOPERATIVE DIAGNOSIS: Patient has chronic GERD. Procedure for prompt evaluate upper GI lesions      POSTOPERATIVE DIAGNOSIS: Probable Candida esophagitis    OPERATION: Upper GI endoscopy with Biopsy    ANESTHESIA: MAC    ESTIMATED BLOOD LOSS: None    COMPLICATIONS: None. SPECIMENS:  Was Obtained: Biopsies from the esophagus to evaluate for Candida esophagitis    HISTORY: The patient is a 61y.o. year old female with history of above preop diagnosis. I recommended esophagogastroduodenoscopy with possible biopsy and I explained the risk, benefits, expected outcome, and alternatives to the procedure. Risks included but are not limited to bleeding, infection, respiratory distress, hypotension, and perforation of the esophagus, stomach, or duodenum. Patient understands and is in agreement. PROCEDURE: The patient was given IV conscious sedation. The patient's SPO2 remained above 90% throughout the procedure. Cetacaine spray given. Patient placed in left lateral position. Olympus  videogastroscope was inserted orally under vision into the esophagus without difficulty and advanced into the stomach then through the pylorus up to the second part of duodenum. Findings:    Retropharyngeal area was grossly normal appearing    Esophagus: abnormal: Probable Candida esophagitis    Stomach:    Fundus and Cardia Examined in Retroflexed View: normal    Body: normal    Antrum: normal    Duodenum:     Descending: normal    Bulb: normal    While withdrawing the scope the above findings were verified and the scope was removed. The patient has tolerated the procedure without unusual events. Recommendations/Plan:   1. F/U Biopsies  2. F/U In Office as instructed  3.  Discussed with the family                   Electronically signed by Vitaliy Clark MD  on 9/30/2021 at 8: 27 AM

## 2021-09-30 NOTE — H&P
medications    Medication Sig Start Date End Date Taking? Authorizing Provider   polyethylene glycol (GLYCOLAX) 17 GM/SCOOP powder Follow instructions provided to you from physician's office. 8/26/21  Yes Jono Garcia MD   bisacodyl (BISACODYL) 5 MG EC tablet Follow instructions provided given by the physician's office. 8/26/21  Yes Jono Garcia MD   rOPINIRole (REQUIP) 1 MG tablet Take 1 tablet by mouth 4 times daily 8/29/18  Yes Divya Melara MD   verapamil (CALAN SR) 120 MG extended release tablet Take 1 tablet by mouth daily 9/9/21   Billy Trinidad MD   butalbital-acetaminophen-caffeine (FIORICET, ESGIC) -01 MG per tablet Take one tab every other day as needed for severe headache 9/9/21   Billy Trinidad MD   loperamide (IMODIUM) 2 MG capsule Take 2 mg by mouth 4 times daily as needed for Diarrhea    Historical Provider, MD   mirtazapine (REMERON) 7.5 MG tablet Take 1 tablet by mouth nightly  Patient taking differently: Take 15 mg by mouth nightly  7/26/21   DESMOND Beltrán CNP   temazepam (RESTORIL) 15 MG capsule TAKE 1 CAPSULE BY MOUTH AT BEDTIME 10/2/19   Historical Provider, MD   LYRICA 75 MG capsule Take 1 capsule by mouth daily. 6/24/19   Historical Provider, MD   aspirin 81 MG tablet Take 81 mg by mouth 1/19/16   Historical Provider, MD   Multiple Vitamin (MULTIVITAMIN) tablet Take 1 tablet by mouth daily 8/30/18   Divya Melara MD        Allergies:     Pcn [penicillins], Neomycin, and Penicillin g    Social History:     Tobacco:    reports that she quit smoking about 4 months ago. Her smoking use included cigarettes. She has a 6.00 pack-year smoking history. She has never used smokeless tobacco.  Alcohol:      reports no history of alcohol use. Drug Use:  reports no history of drug use.     Family History:     Family History   Problem Relation Age of Onset   [facility] Cancer Mother         pancreatic    Cancer Father     Arthritis Sister     Hypertension Mother     Osteoporosis Mother     Diabetes Paternal Grandmother     Lung Cancer Father        Review of Systems:     Positive and Negative as described in HPI. CONSTITUTIONAL:  negative for fevers, chills, sweats, fatigue, weight loss  HEENT:  negative for vision, hearing changes, runny nose, throat pain  RESPIRATORY:  negative for shortness of breath, cough, congestion, wheezing. CARDIOVASCULAR: Hx A Fib follows with PCP Shelby Urbano CNP Ablation done 2021 by Dr Kalyn Dowell   negative for chest pain, palpitations. GASTROINTESTINAL: See HPI    GENITOURINARY:  Hx frequent UTI's Last one 2021  negative for difficulty of urination, burning with urination, frequency   INTEGUMENT:  negative for rash, skin lesions, easy bruising   HEMATOLOGIC/LYMPHATIC:  negative for swelling/edema   ALLERGIC/IMMUNOLOGIC:  negative for urticaria , itching  ENDOCRINE:  negative increase in drinking, increase in urination, hot or cold intolerance  MUSCULOSKELETAL:  negative joint pains, muscle aches, swelling of joints  NEUROLOGICAL:  negative for headaches, dizziness, lightheadedness, numbness, pain, tingling extremities  BEHAVIOR/PSYCH:  negative for depression, anxiety    Physical Exam:   /73   Pulse 70   Temp 96.8 °F (36 °C) (Temporal)   Resp 16   Ht 5' 5\" (1.651 m)   Wt 206 lb (93.4 kg)   SpO2 92%   BMI 34.28 kg/m²   No LMP recorded. Patient is postmenopausal.      General Appearance: obese alert, well appearing, and in no acute distress  Mental status: oriented to person, place, and time with normal affect  Head:  normocephalic, atraumatic.   Eye: no icterus, redness, pupils equal and reactive, extraocular eye movements intact, conjunctiva clear  Ear: normal external ear, no discharge, hearing intact  Nose:  no drainage noted  Mouth: mucous membranes moist  Neck: supple, no carotid bruits, thyroid not palpable  Lungs: Bilateral equal air entry, clear to ausculation, no wheezing, rales or rhonchi, normal effort  Cardiovascular: HR 70 normal rate, regular rhythm, no murmur, gallop, rub. Abdomen: Soft, round, nontender, nondistended, normal bowel sounds  Neurologic: There are no new focal motor or sensory deficits, normal muscle tone and bulk, no abnormal sensation, normal speech, cranial nerves II through XII grossly intact  Skin: No gross lesions, rashes, bruising or bleeding on exposed skin area  Extremities:  peripheral pulses palpable, no pedal edema or calf pain with palpation  Psych: normal affect     Investigations:      Laboratory Testing:  No results for input(s): COVID19 in the last 720 hours. No results for input(s): POCGLU in the last 72 hours. No results found for this or any previous visit (from the past 24 hour(s)). No results for input(s): HGB, HCT, WBC, MCV, PLATELET, NA, K, CL, CO2, BUN, CREATININE, GLUCOSE, INR, PROTIME, APTT, AST, ALT, LABALBU, HCG in the last 720 hours. Imaging/Diagnostics:    Diagnosis:      1. Chronic GERD and diarrhea    Plans:     1.  EGD and diagnostic colonoscopy        DESMOND Olguin - CNP  9/30/2021  7:01 AM

## 2021-09-30 NOTE — OP NOTE
COLONOSCOPY    DATE OF PROCEDURE: 9/30/2021    SURGEON: Alonzo Armenta MD    ASSISTANT: None    PREOPERATIVE DIAGNOSIS: Patient has chronic diarrhea. Procedure performed to evaluate colonic lesions    POSTOPERATIVE DIAGNOSIS: Diverticulosis. Less than adequate preparation. OPERATION: Total colonoscopy and random biopsies from the colon to evaluate for microscopic colitis    ANESTHESIA: MAC    ESTIMATED BLOOD LOSS: None    COMPLICATIONS: None     SPECIMENS:  Was Obtained: Random biopsies from the right, transverse, left colon to check for microscopic colitis    HISTORY: The patient is a 61y.o. year old female with history of above preop diagnosis. I recommended colonoscopy with possible biopsy or polypectomy and I explained the risk, benefits, expected outcome, and alternatives to the procedure. Risks included but are not limited to bleeding, infection, respiratory distress, hypotension, and perforation of the colon and possibility of missing a lesion. The patient understands and is in agreement. PROCEDURE:  The patient's SPO2 remained above 90% throughout the procedure. Digital rectal exam was normal.  The colonoscope was inserted through the anus into the rectum and advanced under direct vision to the cecum without difficulty. Terminal ileum was examined for approximately 2 inches. The prep was poor. Findings:  Terminal ileum: normal    Cecum/Ascending colon: normal    Transverse colon: normal    Descending/Sigmoid colon: normal, has diverticulosis    Rectum/Anus: examined in normal and retroflexed positions and was normal          Random biopsies taken from the right, transverse, left colon to evaluate microscopic colitis      Patient has poor preparation and in some areas has semiformed stools that could not be cleared. So flat lesions can be missed. No colitis or ileitis seen. Withdrawal Time was (minutes): 15          The colon was decompressed and the scope was removed.   The patient tolerated the procedure without unusual events. During the procedure, the patient's blood pressure, pulse and oxygen saturation remained stable and documented. No unusual events occurred during the procedure. Patient was transferred to recovery room and will be discharged when criteria is met. Recommendations/Plan:   1. F/U Biopsies  2. F/U In Office as instructed  3. Discussed with the family  4. High fiber diet   5. Precautions to avoid constipation     Next colonoscopy: 3 years.   If Colonoscopy is less than 10 years the recommended reason is due:poor preparation    Electronically signed by Alexander Sneed MD  on 9/30/2021 at 8:53 AM

## 2021-10-04 LAB — SURGICAL PATHOLOGY REPORT: NORMAL

## 2021-10-05 NOTE — TELEPHONE ENCOUNTER
LVM for pt to call and schedule f/u appt, per Dr. Lion Verma wants to see pt back in 3-4 weeks from 9/30/21.

## 2021-10-07 DIAGNOSIS — F39 MOOD DISORDER (HCC): ICD-10-CM

## 2021-10-08 RX ORDER — MIRTAZAPINE 7.5 MG/1
TABLET, FILM COATED ORAL
Qty: 30 TABLET | Refills: 0 | Status: SHIPPED | OUTPATIENT
Start: 2021-10-08 | End: 2021-11-08

## 2021-10-18 ENCOUNTER — TELEPHONE (OUTPATIENT)
Dept: GASTROENTEROLOGY | Age: 66
End: 2021-10-18

## 2021-11-06 DIAGNOSIS — F39 MOOD DISORDER (HCC): ICD-10-CM

## 2021-11-08 RX ORDER — MIRTAZAPINE 7.5 MG/1
TABLET, FILM COATED ORAL
Qty: 30 TABLET | Refills: 0 | Status: SHIPPED | OUTPATIENT
Start: 2021-11-08 | End: 2022-01-14 | Stop reason: ALTCHOICE

## 2021-11-08 NOTE — TELEPHONE ENCOUNTER
Please advise patient that I have filled this medication, but this problem is being addressed by neurology and next refill should come from neurology

## 2021-11-08 NOTE — TELEPHONE ENCOUNTER
Pam Murguia is calling to request a refill on the following medication(s):    Medication Request:  Requested Prescriptions     Pending Prescriptions Disp Refills    mirtazapine (REMERON) 7.5 MG tablet [Pharmacy Med Name: MIRTAZAPINE 7.5 MG TABLET] 30 tablet 0     Sig: take 1 tablet by mouth every evening       Last Visit Date (If Applicable):  Visit date not found    Next Visit Date:    Visit date not found

## 2021-11-19 ENCOUNTER — OFFICE VISIT (OUTPATIENT)
Dept: GASTROENTEROLOGY | Age: 66
End: 2021-11-19
Payer: MEDICARE

## 2021-11-19 VITALS
WEIGHT: 208 LBS | HEIGHT: 65 IN | OXYGEN SATURATION: 93 % | SYSTOLIC BLOOD PRESSURE: 122 MMHG | DIASTOLIC BLOOD PRESSURE: 71 MMHG | TEMPERATURE: 97.5 F | HEART RATE: 88 BPM | BODY MASS INDEX: 34.66 KG/M2

## 2021-11-19 DIAGNOSIS — F43.9 STRESS: ICD-10-CM

## 2021-11-19 DIAGNOSIS — B37.81 CANDIDA ESOPHAGITIS (HCC): ICD-10-CM

## 2021-11-19 DIAGNOSIS — K58.0 IRRITABLE BOWEL SYNDROME WITH DIARRHEA: Primary | ICD-10-CM

## 2021-11-19 PROCEDURE — 3017F COLORECTAL CA SCREEN DOC REV: CPT | Performed by: INTERNAL MEDICINE

## 2021-11-19 PROCEDURE — G8484 FLU IMMUNIZE NO ADMIN: HCPCS | Performed by: INTERNAL MEDICINE

## 2021-11-19 PROCEDURE — 99213 OFFICE O/P EST LOW 20 MIN: CPT | Performed by: INTERNAL MEDICINE

## 2021-11-19 PROCEDURE — G8427 DOCREV CUR MEDS BY ELIG CLIN: HCPCS | Performed by: INTERNAL MEDICINE

## 2021-11-19 PROCEDURE — 1036F TOBACCO NON-USER: CPT | Performed by: INTERNAL MEDICINE

## 2021-11-19 PROCEDURE — G8417 CALC BMI ABV UP PARAM F/U: HCPCS | Performed by: INTERNAL MEDICINE

## 2021-11-19 ASSESSMENT — ENCOUNTER SYMPTOMS
BACK PAIN: 1
VOICE CHANGE: 0
WHEEZING: 0
VOMITING: 0
SORE THROAT: 0
SHORTNESS OF BREATH: 0
NAUSEA: 0
BLOOD IN STOOL: 0
DIARRHEA: 0
RECTAL PAIN: 0
CONSTIPATION: 0
COLOR CHANGE: 0
ANAL BLEEDING: 0
COUGH: 0
ABDOMINAL DISTENTION: 0
ABDOMINAL PAIN: 0

## 2021-11-19 NOTE — PROGRESS NOTES
GI OFFICE FOLLOW UP    INTERVAL HISTORY:   No referring provider defined for this encounter. Chief Complaint   Patient presents with    Follow-up     colon/egd        1. Irritable bowel syndrome with diarrhea    2. Candida esophagitis (HCC)    3. Stress              HISTORY OF PRESENT ILLNESS: Ms.Mary Jayla Mejia is a 61 y.o. female with a past history remarkable for ,   Patient seen for follow-up of chronic GERD and diarrhea. To further evaluate her symptoms, recently patient had EGD and colonoscopy. Colonoscopy revealed diverticulosis. Unfortunately patient has less than adequate preparation. Random biopsies from the colon did not reveal microscopic colitis. EGD revealed questionable esophagitis and histology revealed Candida. During this visit, patient stated that she is doing well. Her diarrhea resolved. She has a formed stools on daily basis. She still has heartburns. Weight loss resolved. She does have stressful lifestyle. Past Medical,Family, and Social History reviewed and does contribute to the patient presenting condition. Patient's PMH/PSH,SH,PSYCH Hx, MEDs, ALLERGIES, and ROS were all reviewed and updated in the appropriate sections. Yes      PAST MEDICAL HISTORY:  Past Medical History:   Diagnosis Date    A-fib [facility])     Anxiety     Atrial fibrillation ([facility] [address] 08/21/2018    Degenerative disc disease, lumbar 05/2018    Depression     Frequent UTI     Infertility, female     Osteoarthritis     Restless leg syndrome     Squamous cell carcinoma in situ     Thyroid nodule, Rt. Lobe.  27 mm     Urge incontinence 07/07/2020       Past Surgical History:   Procedure Laterality Date    CATARACT REMOVAL  2007    right    COLONOSCOPY N/A 9/30/2021    COLONOSCOPY DIAGNOSTIC WITH BIOPSY performed by Mirza Wray MD at [address] [place] [address] as a child\"      Neomycin Rash    Penicillin G Rash       FAMILY HISTORY:       Problem Relation Age of Onset   Shante Carballo Cancer Mother         pancreatic    Cancer Father     Arthritis Sister     Hypertension Mother     Osteoporosis Mother     Diabetes Paternal Grandmother     Lung Cancer Father          SOCIAL HISTORY:   Social History     Socioeconomic History    Marital status: Single     Spouse name: Not on file    Number of children: Not on file    Years of education: 15    Highest education level: Not on file   Occupational History    Occupation: [address]     Employer: [employer]   Tobacco Use    Smoking status: Former Smoker     Packs/day: 2.00     Years: 3.00     Pack years: 6.00     Types: Cigarettes     Quit date: 2021     Years since quittin.4    Smokeless tobacco: Never Used    Tobacco comment: quit 2 months ago, started again   Vaping Use    Vaping Use: Former   Substance and Sexual Activity    Alcohol use: No     Comment: rarely    Drug use: No    Sexual activity: Never     Partners: Male   Other Topics Concern    Not on file   Social History Narrative    ** Merged History Encounter **          Social Determinants of Health     Financial Resource Strain: Low Risk     Difficulty of Paying Living Expenses: Not hard at all   Food Insecurity: No Food Insecurity    Worried About Running Out of Food in the Last Year: Never true    Pedro of Food in the Last Year: Never true   Transportation Needs: No Transportation Needs    Lack of Transportation (Medical): No    Lack of Transportation (Non-Medical):  No   Physical Activity:     Days of Exercise per Week: Not on file    Minutes of Exercise per Session: Not on file   Stress:     Feeling of Stress : Not on file   Social Connections:     Frequency of Communication with Friends and Family: Not on file    Frequency of Social Gatherings with Friends and Family: Not on file    Attends Latter day Services: Not on file   Shante Carballo Active Member of Clubs or Organizations: Not on file    Attends Club or Organization Meetings: Not on file    Marital Status: Not on file   Intimate Partner Violence:     Fear of Current or Ex-Partner: Not on file    Emotionally Abused: Not on file    Physically Abused: Not on file    Sexually Abused: Not on file   Housing Stability:     Unable to Pay for Housing in the Last Year: Not on file    Number of [place] in the Last Year: Not on file    Unstable Housing in the Last Year: Not on file         REVIEW OF SYSTEMS:         Review of Systems   Constitutional: Negative for unexpected weight change. HENT: Negative for sore throat and voice change. Eyes: Negative for visual disturbance. Respiratory: Negative for cough, shortness of breath and wheezing. Cardiovascular: Negative for chest pain and palpitations. Gastrointestinal: Negative for abdominal distention, abdominal pain, anal bleeding, blood in stool, constipation, diarrhea, nausea, rectal pain and vomiting. Genitourinary: Negative for difficulty urinating. Musculoskeletal: Positive for back pain. Negative for joint swelling. Skin: Negative for color change, rash and wound. Neurological: Positive for headaches. Negative for dizziness, weakness and light-headedness. Hematological: Bruises/bleeds easily. Psychiatric/Behavioral: Positive for sleep disturbance. The patient is not nervous/anxious. PHYSICAL EXAMINATION:     Vital signs reviewed per the nursing documentation. /71 (Site: Left Upper Arm, Position: Sitting, Cuff Size: Large Adult)   Pulse 88   Temp 97.5 °F (36.4 °C) (Temporal)   Ht 5' 5\" (1.651 m)   Wt 208 lb (94.3 kg)   SpO2 93%   BMI 34.61 kg/m²   Body mass index is 34.61 kg/m². Physical Exam  Vitals and nursing note reviewed. Constitutional:       Appearance: She is well-developed. Comments: Moderately overweight patient. HENT:      Head: Normocephalic and atraumatic.    Eyes: General: No scleral icterus. Conjunctiva/sclera: Conjunctivae normal.      Pupils: Pupils are equal, round, and reactive to light. Neck:      Thyroid: No thyromegaly. Vascular: No hepatojugular reflux or JVD. Trachea: No tracheal deviation. Cardiovascular:      Rate and Rhythm: Normal rate and regular rhythm. Heart sounds: Normal heart sounds. Pulmonary:      Effort: Pulmonary effort is normal. No respiratory distress. Breath sounds: Normal breath sounds. No wheezing or rales. Abdominal:      General: Bowel sounds are normal. There is no distension. Palpations: Abdomen is soft. There is no hepatomegaly or mass. Tenderness: There is no abdominal tenderness. There is no rebound. Hernia: No hernia is present. Comments: Obese abdomen. Musculoskeletal:         General: No tenderness. Cervical back: Normal range of motion and neck supple. Comments: No joint swelling   Lymphadenopathy:      Cervical: No cervical adenopathy. Skin:     General: Skin is warm. Findings: No bruising, ecchymosis, erythema or rash. Neurological:      Mental Status: She is alert and oriented to person, place, and time. Cranial Nerves: No cranial nerve deficit. Psychiatric:         Thought Content:  Thought content normal.           LABORATORY DATA: Reviewed  Lab Results   Component Value Date    WBC 8.8 08/13/2021    HGB 15.5 08/13/2021    HCT 46.8 (H) 08/13/2021    MCV 96.1 08/13/2021     08/13/2021     08/13/2021    K 5.2 08/13/2021     08/13/2021    CO2 26 08/13/2021    BUN 13 08/13/2021    CREATININE 0.60 08/13/2021    LABPROT 6.4 03/21/2012    LABALBU 4.4 08/13/2021    BILITOT 0.20 (L) 08/13/2021    ALKPHOS 87 08/13/2021    AST 26 08/13/2021    ALT 18 08/13/2021    INR 1.1 08/21/2018         Lab Results   Component Value Date    RBC 4.87 08/13/2021    HGB 15.5 08/13/2021    MCV 96.1 08/13/2021    MCH 32.0 08/13/2021    MCHC 33.2 08/13/2021    RDW 13.5 08/13/2021    MPV 9.2 08/13/2021    BASOPCT 1 08/13/2021    LYMPHSABS 2.20 08/13/2021    MONOSABS 0.60 08/13/2021    NEUTROABS 5.90 08/13/2021    EOSABS 0.10 08/13/2021    BASOSABS 0.00 08/13/2021         DIAGNOSTIC TESTING:       Assessment    Plan  Cussed with the patient regarding EGD findings and possibility of Candida in the esophagus. Advised Mycostatin solution to swish and swallow for 10 days. Hopefully this will resolve her heartburns. Also discussed regarding possibility of IBS. Explained regarding colonoscopy findings. Explained regarding IBS and management. Because of inadequate colon preparation, she may need repeat colonoscopy with adequate preparation in the next 6 to 9 months. After discussion patient understood and agreed. In between if she has any issues to contact me. Thank you for allowing me to participate in the care of Ms. Arora. For any further questions please do not hesitate to contact me. I have reviewed and agree with the ROS entered by the MA/LPN. Note is dictated utilizing voice recognition software. Unfortunately this leads to occasional typographical errors.  Please contact our office if you have any questions        Carolyne Ham MD,FACP, [facility]  Board Certified in Gastroenterology and [address] [place] [address] Gastroenterology  Office #: [phone]

## 2021-11-22 ENCOUNTER — OFFICE VISIT (OUTPATIENT)
Dept: NEUROLOGY | Age: 66
End: 2021-11-22
Payer: MEDICARE

## 2021-11-22 VITALS
BODY MASS INDEX: 34.82 KG/M2 | TEMPERATURE: 96.8 F | SYSTOLIC BLOOD PRESSURE: 111 MMHG | HEART RATE: 77 BPM | DIASTOLIC BLOOD PRESSURE: 72 MMHG | WEIGHT: 209 LBS | HEIGHT: 65 IN

## 2021-11-22 DIAGNOSIS — G25.81 RLS (RESTLESS LEGS SYNDROME): ICD-10-CM

## 2021-11-22 DIAGNOSIS — F39 AFFECTIVE DISORDER (HCC): ICD-10-CM

## 2021-11-22 DIAGNOSIS — I48.0 PAROXYSMAL ATRIAL FIBRILLATION (HCC): ICD-10-CM

## 2021-11-22 DIAGNOSIS — G43.019 INTRACTABLE MIGRAINE WITHOUT AURA AND WITHOUT STATUS MIGRAINOSUS: Primary | ICD-10-CM

## 2021-11-22 PROCEDURE — G8427 DOCREV CUR MEDS BY ELIG CLIN: HCPCS | Performed by: PSYCHIATRY & NEUROLOGY

## 2021-11-22 PROCEDURE — G8484 FLU IMMUNIZE NO ADMIN: HCPCS | Performed by: PSYCHIATRY & NEUROLOGY

## 2021-11-22 PROCEDURE — G8417 CALC BMI ABV UP PARAM F/U: HCPCS | Performed by: PSYCHIATRY & NEUROLOGY

## 2021-11-22 PROCEDURE — 3017F COLORECTAL CA SCREEN DOC REV: CPT | Performed by: PSYCHIATRY & NEUROLOGY

## 2021-11-22 PROCEDURE — 99214 OFFICE O/P EST MOD 30 MIN: CPT | Performed by: PSYCHIATRY & NEUROLOGY

## 2021-11-22 PROCEDURE — 1036F TOBACCO NON-USER: CPT | Performed by: PSYCHIATRY & NEUROLOGY

## 2021-11-22 RX ORDER — BUTALBITAL, ACETAMINOPHEN AND CAFFEINE 50; 325; 40 MG/1; MG/1; MG/1
TABLET ORAL
Qty: 40 TABLET | Refills: 0 | Status: SHIPPED | OUTPATIENT
Start: 2021-11-22 | End: 2022-08-30 | Stop reason: SDUPTHER

## 2021-11-22 NOTE — PROGRESS NOTES
NEUROLOGY PROGRESS NOTES   Patient Name:       Manuel Zimmer  :        1955  Clinic Visit Date:    2021        Dear Dr. Shannon Jose, APRN - CNP     I saw Ms. Manuel Zimmer in follow-up in the office today in continuation of neurologic care. As you know she  is a 61 y.o. right handed  female with migraine headaches without aura. She was seen in clinic in September this year after prolonged absence of greater than 2 years. Since last visit on 2021; she has significant improvement in headaches with the verapamil. She comes to clinic stating that she started taking verapamil since september and she is feeling much better on present dose of Verapamil. She also has enough supply of Butalbital to be taken. Restless legs \"very bad\" and following up with sleep specialist and waiting for cpap machine. Headaches are described as moderately severe throbbing headaches without aura, located in right supraorbital region occurring at least twice a week lasting for couple of hours. She had associated photophobia, phonophobia. Denied nausea and vomiting. Denied associated numbness or weakness. Excedrin and ibuprofen helped partially. She also stated that she has a long-standing history of restless legs for which she was on Requip. She also has depression for which she has been on mirtazapine. She also takes temazepam nightly for insomnia related to restless legs. Review of systems done by staff reviewed and pertinent positives include headaches with light sensitivity as stated above. Admits to restless legs and has been on Requip. Also admits to anxiety and depression but denies suicidal ideations. She takes temazepam and mirtazapine 7.5 mg every evening. Denied suicidal ideations.     Current Outpatient Medications on File Prior to Visit   Medication Sig Dispense Refill    mirtazapine (REMERON) 7.5 MG tablet take 1 tablet by mouth every evening 30 tablet 0    verapamil (CALAN SR) 120 MG extended release tablet Take 1 tablet by mouth daily 90 tablet 1    butalbital-acetaminophen-caffeine (FIORICET, ESGIC) -40 MG per tablet Take one tab every other day as needed for severe headache 40 tablet 0    temazepam (RESTORIL) 15 MG capsule TAKE 1 CAPSULE BY MOUTH AT BEDTIME  2    LYRICA 150 MG capsule Take 2 capsules by mouth daily. 5    rOPINIRole (REQUIP) 1 MG tablet Take 1 tablet by mouth 4 times daily 90 tablet 3    Multiple Vitamin (MULTIVITAMIN) tablet Take 1 tablet by mouth daily 30 tablet 3    nystatin (MYCOSTATIN) 750266 UNIT/ML suspension Take 5 mLs by mouth 4 times daily for 10 days Retain in mouth as long as possible (Patient not taking: Reported on 11/22/2021) 200 mL 0    polyethylene glycol (GLYCOLAX) 17 GM/SCOOP powder Follow instructions provided to you from physician's office. (Patient not taking: Reported on 11/19/2021) 238 g 0    bisacodyl (BISACODYL) 5 MG EC tablet Follow instructions provided given by the physician's office. (Patient not taking: Reported on 11/19/2021) 2 tablet 0    loperamide (IMODIUM) 2 MG capsule Take 2 mg by mouth 4 times daily as needed for Diarrhea (Patient not taking: Reported on 11/19/2021)      aspirin 81 MG tablet Take 81 mg by mouth (Patient not taking: Reported on 11/19/2021)       No current facility-administered medications on file prior to visit. Allergies: Zhen Boateng is allergic to pcn [penicillins], neomycin, and penicillin g. Past Medical History:   Diagnosis Date    A-fib [facility])     Anxiety     Atrial fibrillation (HCC) 08/21/2018    Degenerative disc disease, lumbar 05/2018    Depression     Frequent UTI     Infertility, female     Osteoarthritis     Restless leg syndrome     Squamous cell carcinoma in situ     Thyroid nodule, Rt. Lobe.  27 mm     Urge incontinence 07/07/2020       Past Surgical History:   Procedure Laterality Date    CATARACT REMOVAL  2007    right    COLONOSCOPY N/A 9/30/2021    COLONOSCOPY DIAGNOSTIC WITH BIOPSY performed by Nita Holman MD at [address]      \" as a child\"    EYE SURGERY  2013    cateract removal    TONSILLECTOMY Bilateral 1963    TONSILLECTOMY AND ADENOIDECTOMY      UPPER GASTROINTESTINAL ENDOSCOPY N/A 9/30/2021    EGD ESOPHAGOGASTRODUODENOSCOPY performed by Nita Holman MD at [address] [facility] History: Marv Castaneda  reports that she quit smoking about 6 months ago. Her smoking use included cigarettes. She has a 6.00 pack-year smoking history. She has never used smokeless tobacco. She reports that she does not drink alcohol and does not use drugs. Family History   Problem Relation Age of Onset    Cancer Mother         pancreatic    Cancer Father     Arthritis Sister     Hypertension Mother     Osteoporosis Mother     Diabetes Paternal Grandmother     Lung Cancer Father        On exam: Blood pressure 111/72, pulse 77, temperature 96.8 °F (36 °C), height 5' 5\" (1.651 m), weight 209 lb (94.8 kg).     GENERAL  Appears comfortable and in no distress   HEENT  NC/ AT   NECK  Supple and no bruits heard   MENTAL STATUS:  Alert, oriented, intact memory, no confusion, normal speech, normal language, no hallucination or delusion; appropriate affect   CRANIAL NERVES: II     -      PERRLA, fundi reveal intact venous pulsations, visual fields intact to confrontation  III,IV,VI -  EOMs full, no afferent defect, no                      LEONARD, no ptosis  V     -     Normal facial sensation  VII    -     Normal facial symmetry  VIII   -     Intact hearing  IX,X -     Symmetrical palate  XI    -     Symmetrical shoulder shrug  XII   -     Midline tongue, no atrophy    MOTOR FUNCTION:  significant for good strength of grade 5/5 in bilateral proximal and distal muscle groups of both upper and lower extremities with normal bulk, normal tone and no involuntary movements, no tremor   SENSORY FUNCTION: Normal touch, normal pin, normal vibration, normal proprioception   CEREBELLAR FUNCTION:  Intact fine motor control over upper limbs   REFLEX FUNCTION:  Symmetric, no perverted reflex, no Babinski sign   STATION and GAIT  Normal station, normal gait     MRI Brain (w/wo) (10/10/19): No acute infarct, intracranial hemorrhage or significant mass-effect. No evidence of abnormal intracranial enhancement. Mild amount of chronic small vessel ischemic white matter disease. Mild diffuse T1 hypointensity involving clivus and upper cervical spine. Impression and Plan: Ms. Maria Fernanda Ortiz is a 61 y.o. female with   Migrainous headaches: Significantly improved with daily verapamil; to continue verapamil 120 mg ER daily for migraine headache prophylaxis. Also to continue butalbital prn for rescue therapy. Medication counseling including risks and benefits and possible adverse effects discussed. Comorbid restless leg syndrome with sleep apnea: Presently on ropinirole; waiting for CPAP machine. Comorbid affective disorder: On mirtazapine and temazepam as per psych. Follow-up in clinic in 6 months. This note was partially created using voice recognition software and is inherently subject to errors including those of syntax and \"sound alike\" substitutions which may escape proofreading. In such instances, original meaning may be extrapolated by contextual derivation.

## 2021-11-22 NOTE — PROGRESS NOTES
Constitutional [] Weight loss/gain   [] Fatigue  [] Fever/Chills   HEENT [] Hearing Loss  [] Visual Disturbance  [] Tinnitus  [x] Eye pain   Respiratory [] Shortness of Breath  [] Cough  [] Snoring   Cardiovascular [] Chest Pain  [] Palpitations  [] Lightheaded   GI [] Constipation  [] Diarrhea  [] Swallowing change  [] Nausea/vomiting    [] Urinary Frequency  [] Urinary Urgency   Musculoskeletal [] Neck pain  [] Back pain  [] Muscle pain  [] Restless legs   Dermatologic [] Skin changes   Neurologic [] Memory loss/confusion  [] Seizures  [] Trouble walking or imbalance  [] Dizziness  [] Sleep disturbance  [] Weakness  [] Numbness  [] Tremors  [] Speech Difficulty  [x] Headaches  [] Light Sensitivity  [] Sound Sensitivity   Endocrinology []Excessive thirst  []Excessive hunger   Psychiatric [x] Anxiety/Depression  [] Hallucination   Allergy/immunology []Hives/environmental allergies   Hematologic/lymph [] Abnormal bleeding  [] Abnormal bruising

## 2021-11-23 ENCOUNTER — TELEPHONE (OUTPATIENT)
Dept: FAMILY MEDICINE CLINIC | Age: 66
End: 2021-11-23

## 2021-11-23 NOTE — TELEPHONE ENCOUNTER
----- Message from Josef Rojas sent at 11/23/2021  9:32 AM EST -----  Subject: Appointment Request    Reason for Call: Routine (Patient Request) No Script    QUESTIONS  Type of Appointment? Established Patient  Reason for appointment request? Available appointments did not meet   patient need  Additional Information for Provider? screen green/ pt would like to speak   to her pcp about her antidepressant medication/ pt needs an appointment as   soon as possible she also states that she is upset . Her neurologists   refused to refill her medicine. Pt is willing to see another doctor. ---------------------------------------------------------------------------  --------------  Jasmina Reins INFO  What is the best way for the office to contact you? OK to leave message on   voicemail  Preferred Call Back Phone Number? [phone]  ---------------------------------------------------------------------------  --------------  SCRIPT ANSWERS  Relationship to Patient? Self  (Is the patient requesting to see the provider for a procedure?)? No  (Is the patient requesting to see the provider urgently  today or   tomorrow. )? No  Have you been diagnosed with, awaiting test results for, or told that you   are suspected of having COVID-19 (Coronavirus)? (If patient has tested   negative or was tested as a requirement for work, school, or travel and   not based on symptoms, answer no)? No  Within the past two weeks have you developed any of the following symptoms   (answer no if symptoms have been present longer than 2 weeks or began   more than 2 weeks ago)? Fever or Chills, Cough, Shortness of breath or   difficulty breathing, Loss of taste or smell, Sore throat, Nasal   congestion, Sneezing or runny nose, Fatigue or generalized body aches   (answer no if pain is specific to a body part e.g. back pain), Diarrhea,   Headache? No  Have you had close contact with someone with COVID-19 in the last 14 days?    No  (Service Expert  click yes below to proceed with [employer] As Usual   Scheduling)?  Yes

## 2021-12-10 ENCOUNTER — OFFICE VISIT (OUTPATIENT)
Dept: FAMILY MEDICINE CLINIC | Age: 66
End: 2021-12-10
Payer: MEDICARE

## 2021-12-10 VITALS
OXYGEN SATURATION: 92 % | BODY MASS INDEX: 35.28 KG/M2 | SYSTOLIC BLOOD PRESSURE: 120 MMHG | DIASTOLIC BLOOD PRESSURE: 84 MMHG | HEART RATE: 77 BPM | TEMPERATURE: 97.1 F | WEIGHT: 212 LBS

## 2021-12-10 DIAGNOSIS — G25.81 RLS (RESTLESS LEGS SYNDROME): Primary | ICD-10-CM

## 2021-12-10 DIAGNOSIS — F39 MOOD DISORDER (HCC): ICD-10-CM

## 2021-12-10 PROCEDURE — 1036F TOBACCO NON-USER: CPT | Performed by: STUDENT IN AN ORGANIZED HEALTH CARE EDUCATION/TRAINING PROGRAM

## 2021-12-10 PROCEDURE — G8484 FLU IMMUNIZE NO ADMIN: HCPCS | Performed by: STUDENT IN AN ORGANIZED HEALTH CARE EDUCATION/TRAINING PROGRAM

## 2021-12-10 PROCEDURE — 99214 OFFICE O/P EST MOD 30 MIN: CPT | Performed by: STUDENT IN AN ORGANIZED HEALTH CARE EDUCATION/TRAINING PROGRAM

## 2021-12-10 PROCEDURE — 3017F COLORECTAL CA SCREEN DOC REV: CPT | Performed by: STUDENT IN AN ORGANIZED HEALTH CARE EDUCATION/TRAINING PROGRAM

## 2021-12-10 PROCEDURE — G8417 CALC BMI ABV UP PARAM F/U: HCPCS | Performed by: STUDENT IN AN ORGANIZED HEALTH CARE EDUCATION/TRAINING PROGRAM

## 2021-12-10 PROCEDURE — G8427 DOCREV CUR MEDS BY ELIG CLIN: HCPCS | Performed by: STUDENT IN AN ORGANIZED HEALTH CARE EDUCATION/TRAINING PROGRAM

## 2021-12-10 RX ORDER — BUPROPION HYDROCHLORIDE 150 MG/1
150 TABLET ORAL EVERY MORNING
Qty: 90 TABLET | Refills: 1 | Status: SHIPPED | OUTPATIENT
Start: 2021-12-10 | End: 2022-01-14

## 2021-12-10 ASSESSMENT — PATIENT HEALTH QUESTIONNAIRE - PHQ9
1. LITTLE INTEREST OR PLEASURE IN DOING THINGS: 0
SUM OF ALL RESPONSES TO PHQ QUESTIONS 1-9: 2
SUM OF ALL RESPONSES TO PHQ QUESTIONS 1-9: 2
2. FEELING DOWN, DEPRESSED OR HOPELESS: 2
SUM OF ALL RESPONSES TO PHQ9 QUESTIONS 1 & 2: 2
SUM OF ALL RESPONSES TO PHQ QUESTIONS 1-9: 2

## 2021-12-13 ASSESSMENT — ENCOUNTER SYMPTOMS
TROUBLE SWALLOWING: 0
NAUSEA: 0
COLOR CHANGE: 0
ABDOMINAL PAIN: 0
VOMITING: 0

## 2021-12-13 NOTE — PROGRESS NOTES
Subjective: Na Ma presents for   Chief Complaint   Patient presents with    Medication Problem     Patient was told her antidepressant needs to be filled by neurology but they will not fill it. Here for concern about antidepressant changes especially her Remeron. She was also having issues with restless leg syndrome. She has history of this and has been through multiple therapies without any therapeutic effect. Has tried multiple combinations and mirapex, but current ropinirole is helping her. Patient Active Problem List   Diagnosis    Current smoker    Mixed anxiety depressive disorder    RLS (restless legs syndrome)    Sleep disturbance    Thyroid nodule, Rt. Lobe. 27 mm    Atrial fibrillation (HCC)    Depression    Urge incontinence    S/P ablation of atrial fibrillation    IFG (impaired fasting glucose)    Dilation of biliary tract         Review of Systems   Constitutional: Negative for appetite change, chills, fatigue and fever. HENT: Negative for congestion and trouble swallowing. Cardiovascular: Negative. Gastrointestinal: Negative for abdominal pain, nausea and vomiting. Genitourinary: Negative for difficulty urinating, dysuria and flank pain. Musculoskeletal: Negative for arthralgias. Skin: Negative for color change. Neurological: Negative for dizziness and headaches. Psychiatric/Behavioral: Positive for dysphoric mood. Negative for behavioral problems and confusion. The patient is not nervous/anxious.          Objective:  Physical Exam   Vitals:   Vitals:    12/10/21 1054   BP: 120/84   Site: Left Upper Arm   Position: Sitting   Cuff Size: Large Adult   Pulse: 77   Temp: 97.1 °F (36.2 °C)   TempSrc: Temporal   SpO2: 92%   Weight: 212 lb (96.2 kg)     Wt Readings from Last 3 Encounters:   12/10/21 212 lb (96.2 kg)   11/22/21 209 lb (94.8 kg)   11/19/21 208 lb (94.3 kg)     Ht Readings from Last 3 Encounters:   11/22/21 5' 5\" (1.651 m)   11/19/21 5' 5\" (1.651 m)   09/30/21 5' 5\" (1.651 m)     Body mass index is 35.28 kg/m². Physical Exam  Vitals reviewed. Constitutional:       General: She is not in acute distress. Appearance: Normal appearance. She is normal weight. HENT:      Mouth/Throat:      Mouth: Mucous membranes are moist.   Eyes:      Conjunctiva/sclera: Conjunctivae normal.   Cardiovascular:      Rate and Rhythm: Normal rate and regular rhythm. Heart sounds: Normal heart sounds. Pulmonary:      Effort: Pulmonary effort is normal.      Breath sounds: Normal breath sounds. Abdominal:      General: Abdomen is flat. Bowel sounds are normal.      Palpations: Abdomen is soft. Skin:     General: Skin is warm and dry. Neurological:      Mental Status: She is alert and oriented to person, place, and time. Psychiatric:         Attention and Perception: Attention and perception normal.         Mood and Affect: Mood is depressed. Affect is tearful. Speech: Speech normal.         Behavior: Behavior normal. Behavior is cooperative. Thought Content: Thought content normal.         Cognition and Memory: Cognition and memory normal.         Judgment: Judgment normal.            Assessment/Plan:    Diagnosis Orders   1. RLS (restless legs syndrome)  buPROPion (WELLBUTRIN XL) 150 MG extended release tablet   2. Mood disorder (HCC)  buPROPion (WELLBUTRIN XL) 150 MG extended release tablet        Return in about 4 weeks (around 1/7/2022). Given that most medications have been tried and mostly failed, there is not much we can do. I will however put her on bupropion. This will provide an antidepressant effect that remeron was providing. It also has dopamine increasing effect which should also help with her restless leg syndrome. She is depressed and has mood issues given her lack of sleep from the restless legs syndrome. She states it affects her whole life and quality of life. Reassess in one month for improvement.

## 2022-01-14 ENCOUNTER — OFFICE VISIT (OUTPATIENT)
Dept: FAMILY MEDICINE CLINIC | Age: 67
End: 2022-01-14
Payer: MEDICARE

## 2022-01-14 VITALS
BODY MASS INDEX: 34.21 KG/M2 | HEART RATE: 68 BPM | TEMPERATURE: 96.9 F | WEIGHT: 205.6 LBS | DIASTOLIC BLOOD PRESSURE: 80 MMHG | OXYGEN SATURATION: 97 % | SYSTOLIC BLOOD PRESSURE: 118 MMHG

## 2022-01-14 DIAGNOSIS — F39 MOOD DISORDER (HCC): Primary | ICD-10-CM

## 2022-01-14 DIAGNOSIS — G47.33 OSA (OBSTRUCTIVE SLEEP APNEA): ICD-10-CM

## 2022-01-14 DIAGNOSIS — G25.81 RLS (RESTLESS LEGS SYNDROME): ICD-10-CM

## 2022-01-14 PROCEDURE — 3017F COLORECTAL CA SCREEN DOC REV: CPT | Performed by: NURSE PRACTITIONER

## 2022-01-14 PROCEDURE — G8484 FLU IMMUNIZE NO ADMIN: HCPCS | Performed by: NURSE PRACTITIONER

## 2022-01-14 PROCEDURE — G8427 DOCREV CUR MEDS BY ELIG CLIN: HCPCS | Performed by: NURSE PRACTITIONER

## 2022-01-14 PROCEDURE — 99214 OFFICE O/P EST MOD 30 MIN: CPT | Performed by: NURSE PRACTITIONER

## 2022-01-14 PROCEDURE — 1036F TOBACCO NON-USER: CPT | Performed by: NURSE PRACTITIONER

## 2022-01-14 PROCEDURE — G8417 CALC BMI ABV UP PARAM F/U: HCPCS | Performed by: NURSE PRACTITIONER

## 2022-01-14 RX ORDER — BUPROPION HYDROCHLORIDE 300 MG/1
300 TABLET ORAL EVERY MORNING
Qty: 90 TABLET | Refills: 1 | Status: SHIPPED | OUTPATIENT
Start: 2022-01-14 | End: 2022-07-08

## 2022-01-14 ASSESSMENT — ENCOUNTER SYMPTOMS
VOMITING: 0
COLOR CHANGE: 0
SHORTNESS OF BREATH: 0
ALLERGIC/IMMUNOLOGIC NEGATIVE: 1
DIARRHEA: 0
RESPIRATORY NEGATIVE: 1
CHEST TIGHTNESS: 0
EYES NEGATIVE: 1
COUGH: 0
GASTROINTESTINAL NEGATIVE: 1
NAUSEA: 0

## 2022-01-14 ASSESSMENT — PATIENT HEALTH QUESTIONNAIRE - PHQ9
SUM OF ALL RESPONSES TO PHQ QUESTIONS 1-9: 2
1. LITTLE INTEREST OR PLEASURE IN DOING THINGS: 1
SUM OF ALL RESPONSES TO PHQ QUESTIONS 1-9: 2
2. FEELING DOWN, DEPRESSED OR HOPELESS: 1
SUM OF ALL RESPONSES TO PHQ QUESTIONS 1-9: 2
SUM OF ALL RESPONSES TO PHQ9 QUESTIONS 1 & 2: 2
SUM OF ALL RESPONSES TO PHQ QUESTIONS 1-9: 2

## 2022-01-14 NOTE — PROGRESS NOTES
[facility]  [address] [facility]  Dept: [id number] is a 61 y.o. female who presents today for her medical conditions/complaintsas noted below. Josh Dominguez is here today c/o Depression    Past Medical History:   Diagnosis Date    A-fib [facility])     Anxiety     Atrial fibrillation ([address] 2018    Degenerative disc disease, lumbar 2018    Depression     Frequent UTI     Infertility, female     Osteoarthritis     Restless leg syndrome     Squamous cell carcinoma in situ     Thyroid nodule, Rt. Lobe.  27 mm     Urge incontinence 2020      Past Surgical History:   Procedure Laterality Date    CATARACT REMOVAL  2007    right    COLONOSCOPY N/A 2021    COLONOSCOPY DIAGNOSTIC WITH BIOPSY performed by Randolph Barney MD at [address]      \" as a child\"   [address] [place] [address] SURGERY  2013    cateract removal    TONSILLECTOMY Bilateral 1963    TONSILLECTOMY AND ADENOIDECTOMY      UPPER GASTROINTESTINAL ENDOSCOPY N/A 2021    EGD ESOPHAGOGASTRODUODENOSCOPY performed by Randolph Barney MD at [place] History   Problem Relation Age of Onset   Melba Manual Cancer Mother         pancreatic    Cancer Father     Arthritis Sister     Hypertension Mother     Osteoporosis Mother     Diabetes Paternal Grandmother     Lung Cancer Father        Social History     Tobacco Use    Smoking status: Former Smoker     Packs/day: 2.00     Years: 3.00     Pack years: 6.00     Types: Cigarettes     Quit date: 2021     Years since quittin.6    Smokeless tobacco: Never Used    Tobacco comment: quit 2 months ago, started again   Substance Use Topics    Alcohol use: No     Comment: rarely      Current Outpatient Medications   Medication Sig Dispense Refill    buPROPion (WELLBUTRIN XL) 150 MG extended release tablet Take 1 tablet by mouth every morning 90 tablet 1    verapamil (CALAN SR) 120 MG extended release tablet Take 1 tablet by mouth daily 90 tablet 2    butalbital-acetaminophen-caffeine (FIORICET, ESGIC) -40 MG per tablet Take one tab every other day as needed for severe headache 40 tablet 0    temazepam (RESTORIL) 15 MG capsule TAKE 1 CAPSULE BY MOUTH AT BEDTIME  2    LYRICA 200 MG capsule Take 2 capsules by mouth daily. 5    rOPINIRole (REQUIP) 1 MG tablet Take 1 tablet by mouth 4 times daily 90 tablet 3    Multiple Vitamin (MULTIVITAMIN) tablet Take 1 tablet by mouth daily 30 tablet 3     No current facility-administered medications for this visit. Allergies   Allergen Reactions    Pcn [Penicillins] Other (See Comments)     \" as a child\"      Neomycin Rash    Penicillin G Rash         HPI:     HPI    Presents today c/o follow-up depression / mood disorder   Previously taking Remeron & switched to Wellbutrin by [facility] significant for RLS & atrial fib   Patient follows w/ sleep medicine, neurology, & Cardiology   Hasn't noticed a big difference in depressive sx with med change , feels 'the same'  Admits to some depression, crying spells, feeling down, irritable, etc.  She is sleeping well at night with new RLS med   Occasional suicidal thoughts for a long time, she declines any active thoughts or plans   PHQ 9 - 2 , difficult to complete  Patient had poor experience with psychiatrist in the past  She was previously taking Remeron which was discontinued due to concerns of polypharmacy & atrial fib     Health Maintenance:      Subjective:     Review of Systems   Constitutional: Negative. Negative for appetite change, chills, diaphoresis and fever. HENT: Negative. Eyes: Negative. Respiratory: Negative. Negative for cough, chest tightness and shortness of breath. Cardiovascular: Negative. Negative for chest pain and leg swelling. Gastrointestinal: Negative. Negative for diarrhea, nausea and vomiting. Endocrine: Negative. Genitourinary: Negative. Negative for difficulty urinating and dysuria. Musculoskeletal: Negative. Skin: Negative. Negative for color change, pallor, rash and wound. Allergic/Immunologic: Negative. Neurological: Negative. Negative for seizures, syncope and facial asymmetry. Psychiatric/Behavioral: Positive for decreased concentration and dysphoric mood. Negative for hallucinations, self-injury, sleep disturbance and suicidal ideas. The patient is nervous/anxious. The patient is not hyperactive. Objective:     Vitals:    01/14/22 0952   BP: 118/80   Pulse: 68   Temp: 96.9 °F (36.1 °C)   SpO2: 97%       Body mass index is 34.21 kg/m². Physical Exam  Constitutional:       General: She is not in acute distress. Appearance: Normal appearance. She is well-developed. She is obese. She is not ill-appearing, toxic-appearing or diaphoretic. HENT:      Head: Normocephalic and atraumatic. Right Ear: External ear normal.      Left Ear: External ear normal.      Nose: Nose normal.   Eyes:      General: No scleral icterus. Right eye: No discharge. Left eye: No discharge. Conjunctiva/sclera: Conjunctivae normal.      Pupils: Pupils are equal, round, and reactive to light. Neck:      Trachea: No tracheal deviation. Cardiovascular:      Rate and Rhythm: Normal rate and regular rhythm. Heart sounds: Normal heart sounds. No murmur heard. No friction rub. No gallop. Pulmonary:      Effort: Pulmonary effort is normal. No tachypnea, accessory muscle usage or respiratory distress. Breath sounds: Normal breath sounds. No stridor. No decreased breath sounds, wheezing, rhonchi or rales. Abdominal:      Palpations: Abdomen is soft. Musculoskeletal:         General: No tenderness or deformity. Normal range of motion. Cervical back: Normal range of motion and neck supple. Skin:     General: Skin is warm and dry. Coloration: Skin is not pale. Findings: No erythema or rash. Neurological:      Mental Status: She is alert and oriented to person, place, and time. GCS: GCS eye subscore is 4. GCS verbal subscore is 5. GCS motor subscore is 6. Gait: Gait is intact. Gait normal.   Psychiatric:         Attention and Perception: Attention and perception normal.         Mood and Affect: Mood is anxious. Affect is tearful. Speech: Speech normal.         Behavior: Behavior normal. Behavior is cooperative. Thought Content: Thought content normal.         Judgment: Judgment normal.           Assessment:         1. Mood disorder ([address]    2. MIRELLA (obstructive sleep apnea)    3. RLS (restless legs syndrome)        Plan:     1. Mood disorder (HCC)    - buPROPion (WELLBUTRIN XL) 300 MG extended release tablet; Take 1 tablet by mouth every morning  Dispense: 90 tablet; Refill: 1  - [employer]    Not at goal  Increase Wellbutrin 300 mg qd , side effects reinforced   Interaction  per [employer]   Encouraged psychiatry follow-up for med management, pt will call insurance for list of names  Case consulted with Dr. Gerardo Moody   Encouraged psychology follow-up for counseling   Safety plan discussed     On the basis of positive PHQ-9 screening (PHQ-9 Total Score: 2), the following plan was implemented: additional evaluation and assessment performed, referral to Behavioral health provided and medication prescribed - patient will call for any significant medication side effects or worsening symptoms of depression. Patient will follow-up in 2 week(s) with psychologist.    2. MIRELLA (obstructive sleep apnea)    Encouraged CPAP compliance  Follows w/ Sleep Medicine     3. RLS (restless legs syndrome)    Follows w/ Neurology & Sleep Medicine     Discussed use, benefit, and side effects of prescribed medications. All patient questions answered. Pt voiced understanding. Reviewed health maintenance. Instructed to continue current medications, diet and exercise.   Patient agreedwith treatment plan. Follow up as directed.      Electronically signed by DESMOND Jenkins CNP on 1/14/2022

## 2022-01-17 ENCOUNTER — HOSPITAL ENCOUNTER (OUTPATIENT)
Dept: MAMMOGRAPHY | Age: 67
Discharge: HOME | End: 2022-01-19
Payer: MEDICARE

## 2022-01-17 ENCOUNTER — HOSPITAL ENCOUNTER (OUTPATIENT)
Dept: ULTRASOUND IMAGING | Age: 67
Discharge: HOME | End: 2022-01-19
Payer: MEDICARE

## 2022-01-17 DIAGNOSIS — R92.8 ABNORMAL SCREENING MAMMOGRAM: ICD-10-CM

## 2022-01-17 DIAGNOSIS — Z12.31 ENCOUNTER FOR SCREENING MAMMOGRAM FOR MALIGNANT NEOPLASM OF BREAST: Primary | ICD-10-CM

## 2022-01-17 PROCEDURE — G0279 TOMOSYNTHESIS, MAMMO: HCPCS

## 2022-02-08 ENCOUNTER — TELEPHONE (OUTPATIENT)
Dept: FAMILY MEDICINE CLINIC | Age: 67
End: 2022-02-08

## 2022-02-08 NOTE — TELEPHONE ENCOUNTER
Left message on patient's recorder to call back with what information is needed on the letter she requested so she can continue to donate plasma.  I also asked to her to see if there is a form that could be signed

## 2022-02-10 NOTE — TELEPHONE ENCOUNTER
Patient says letter just needs to say that you don't think she has any health issues that would be detrimental to her health to donate plasma.   Copy of her letter requesting this  is scanned into her chart

## 2022-03-15 ENCOUNTER — OFFICE VISIT (OUTPATIENT)
Dept: FAMILY MEDICINE CLINIC | Age: 67
End: 2022-03-15

## 2022-03-15 VITALS
OXYGEN SATURATION: 98 % | BODY MASS INDEX: 33.28 KG/M2 | DIASTOLIC BLOOD PRESSURE: 80 MMHG | SYSTOLIC BLOOD PRESSURE: 118 MMHG | TEMPERATURE: 97.1 F | WEIGHT: 200 LBS | HEART RATE: 83 BPM

## 2022-03-15 DIAGNOSIS — E78.5 DYSLIPIDEMIA: ICD-10-CM

## 2022-03-15 DIAGNOSIS — E04.1 THYROID NODULE: ICD-10-CM

## 2022-03-15 DIAGNOSIS — Z98.890 S/P ABLATION OF ATRIAL FIBRILLATION: ICD-10-CM

## 2022-03-15 DIAGNOSIS — Z23 IMMUNIZATION DUE: ICD-10-CM

## 2022-03-15 DIAGNOSIS — Z12.31 ENCOUNTER FOR SCREENING MAMMOGRAM FOR MALIGNANT NEOPLASM OF BREAST: ICD-10-CM

## 2022-03-15 DIAGNOSIS — Z86.79 S/P ABLATION OF ATRIAL FIBRILLATION: ICD-10-CM

## 2022-03-15 DIAGNOSIS — I48.0 PAROXYSMAL ATRIAL FIBRILLATION (HCC): ICD-10-CM

## 2022-03-15 DIAGNOSIS — R73.01 IFG (IMPAIRED FASTING GLUCOSE): ICD-10-CM

## 2022-03-15 DIAGNOSIS — Z78.0 POST-MENOPAUSAL: ICD-10-CM

## 2022-03-15 DIAGNOSIS — F39 MOOD DISORDER (HCC): Primary | ICD-10-CM

## 2022-03-15 PROCEDURE — 99214 OFFICE O/P EST MOD 30 MIN: CPT | Performed by: NURSE PRACTITIONER

## 2022-03-15 PROCEDURE — G0009 ADMIN PNEUMOCOCCAL VACCINE: HCPCS | Performed by: NURSE PRACTITIONER

## 2022-03-15 PROCEDURE — 90670 PCV13 VACCINE IM: CPT | Performed by: NURSE PRACTITIONER

## 2022-03-15 ASSESSMENT — PATIENT HEALTH QUESTIONNAIRE - PHQ9
2. FEELING DOWN, DEPRESSED OR HOPELESS: 0
SUM OF ALL RESPONSES TO PHQ QUESTIONS 1-9: 3
10. IF YOU CHECKED OFF ANY PROBLEMS, HOW DIFFICULT HAVE THESE PROBLEMS MADE IT FOR YOU TO DO YOUR WORK, TAKE CARE OF THINGS AT HOME, OR GET ALONG WITH OTHER PEOPLE: 1
7. TROUBLE CONCENTRATING ON THINGS, SUCH AS READING THE NEWSPAPER OR WATCHING TELEVISION: 1
1. LITTLE INTEREST OR PLEASURE IN DOING THINGS: 0
4. FEELING TIRED OR HAVING LITTLE ENERGY: 1
9. THOUGHTS THAT YOU WOULD BE BETTER OFF DEAD, OR OF HURTING YOURSELF: 0
6. FEELING BAD ABOUT YOURSELF - OR THAT YOU ARE A FAILURE OR HAVE LET YOURSELF OR YOUR FAMILY DOWN: 0
8. MOVING OR SPEAKING SO SLOWLY THAT OTHER PEOPLE COULD HAVE NOTICED. OR THE OPPOSITE, BEING SO FIGETY OR RESTLESS THAT YOU HAVE BEEN MOVING AROUND A LOT MORE THAN USUAL: 0
3. TROUBLE FALLING OR STAYING ASLEEP: 1
SUM OF ALL RESPONSES TO PHQ QUESTIONS 1-9: 3
SUM OF ALL RESPONSES TO PHQ9 QUESTIONS 1 & 2: 0
5. POOR APPETITE OR OVEREATING: 0

## 2022-03-15 ASSESSMENT — ENCOUNTER SYMPTOMS
WHEEZING: 0
COLOR CHANGE: 0
GASTROINTESTINAL NEGATIVE: 1
VOMITING: 0
NAUSEA: 0
ALLERGIC/IMMUNOLOGIC NEGATIVE: 1
COUGH: 0
DIARRHEA: 0
SHORTNESS OF BREATH: 0
RESPIRATORY NEGATIVE: 1
CHEST TIGHTNESS: 0
EYES NEGATIVE: 1

## 2022-03-15 NOTE — PROGRESS NOTES
[facility]  [address] [facility]  Dept: [address] Baker is a 74 y.o. female who presents today for her medical conditions/complaintsas noted below. Marla Mopedrochristin is here today c/o Medication Check    Past Medical History:   Diagnosis Date    A-fib [facility])     Anxiety     Atrial fibrillation ([address] 2018    Degenerative disc disease, lumbar 2018    Depression     Frequent UTI     Infertility, female     MIRELLA (obstructive sleep apnea) 2022    Osteoarthritis     Restless leg syndrome     Squamous cell carcinoma in situ     Thyroid nodule, Rt. Lobe.  27 mm     Urge incontinence 2020      Past Surgical History:   Procedure Laterality Date    CATARACT REMOVAL  2007    right    COLONOSCOPY N/A 2021    COLONOSCOPY DIAGNOSTIC WITH BIOPSY performed by Hamlet Hope MD at [address]      \" as a child\"   [address] [place] [address] SURGERY  2013    cateract removal    TONSILLECTOMY Bilateral 1963    TONSILLECTOMY AND ADENOIDECTOMY      UPPER GASTROINTESTINAL ENDOSCOPY N/A 2021    EGD ESOPHAGOGASTRODUODENOSCOPY performed by Hamlet Hope MD at [address] History   Problem Relation Age of Onset   Lul Safe Cancer Mother         pancreatic    Cancer Father     Arthritis Sister     Hypertension Mother     Osteoporosis Mother     Diabetes Paternal Grandmother     Lung Cancer Father        Social History     Tobacco Use    Smoking status: Former Smoker     Packs/day: 2.00     Years: 3.00     Pack years: 6.00     Types: Cigarettes     Quit date: 2021     Years since quittin.8    Smokeless tobacco: Never Used    Tobacco comment: quit 2 months ago, started again   Substance Use Topics    Alcohol use: No     Comment: rarely      Current Outpatient Medications   Medication Sig Dispense Refill    buPROPion (WELLBUTRIN XL) 300 MG extended release tablet Take 1 tablet by mouth every morning 90 tablet 1    verapamil (CALAN SR) 120 MG extended release tablet Take 1 tablet by mouth daily 90 tablet 2    butalbital-acetaminophen-caffeine (FIORICET, ESGIC) -40 MG per tablet Take one tab every other day as needed for severe headache 40 tablet 0    temazepam (RESTORIL) 15 MG capsule TAKE 1 CAPSULE BY MOUTH AT BEDTIME  2    LYRICA 200 MG capsule Take 2 capsules by mouth daily. 5    rOPINIRole (REQUIP) 1 MG tablet Take 1 tablet by mouth 4 times daily 90 tablet 3    Multiple Vitamin (MULTIVITAMIN) tablet Take 1 tablet by mouth daily 30 tablet 3     No current facility-administered medications for this visit. Allergies   Allergen Reactions    Pcn [Penicillins] Other (See Comments)     \" as a child\"      Neomycin Rash    Penicillin G Rash         HPI:     HPI    Presents today c/o follow-up depression / mood disorder   Previously taking Remeron & switched to Wellbutrin by [facility] significant for RLS & atrial fib   Dose increased last visit with relief   PHQ 9 - 3   Patient had poor experience with Psychiatrist in the past but it was recommended she f/u last visit due to polypharmacy     H/o RLS - follows w/ Sleep Medicine    H/o thyroid nodule - due for f/u with Diabetes & [address]    H/o atrial fib s/p ablation 02/2021  Follows w/ Cardiology  No longer on anticoagulation or rate control    H/o RLS - follows w/ Neurology     Health Maintenance:      Subjective:     Review of Systems   Constitutional: Negative. Negative for appetite change, chills, diaphoresis and fever. HENT: Negative. Eyes: Negative. Respiratory: Negative. Negative for cough, chest tightness, shortness of breath and wheezing. Cardiovascular: Negative. Negative for chest pain, palpitations and leg swelling. Gastrointestinal: Negative. Negative for diarrhea, nausea and vomiting. Endocrine: Negative. Genitourinary: Negative. Negative for difficulty urinating.    Musculoskeletal: Negative. Skin: Negative. Negative for color change, pallor, rash and wound. Allergic/Immunologic: Negative. Neurological: Negative. Negative for seizures, syncope and facial asymmetry. Hematological: Negative. Psychiatric/Behavioral: Negative. Negative for decreased concentration, dysphoric mood and sleep disturbance. The patient is not nervous/anxious. Objective:     Vitals:    03/15/22 1458   BP: 118/80   Pulse: 83   Temp: 97.1 °F (36.2 °C)   SpO2: 98%       Body mass index is 33.28 kg/m². Physical Exam  Constitutional:       General: She is not in acute distress. Appearance: Normal appearance. She is well-developed. She is obese. She is not ill-appearing, toxic-appearing or diaphoretic. HENT:      Head: Normocephalic and atraumatic. Right Ear: External ear normal.      Left Ear: External ear normal.      Nose: Nose normal.   Eyes:      General: No scleral icterus. Right eye: No discharge. Left eye: No discharge. Conjunctiva/sclera: Conjunctivae normal.      Pupils: Pupils are equal, round, and reactive to light. Neck:      Trachea: No tracheal deviation. Cardiovascular:      Rate and Rhythm: Normal rate and regular rhythm. Heart sounds: Normal heart sounds. No murmur heard. No friction rub. No gallop. Pulmonary:      Effort: Pulmonary effort is normal. No tachypnea, accessory muscle usage or respiratory distress. Breath sounds: Normal breath sounds. No stridor. No decreased breath sounds, wheezing, rhonchi or rales. Abdominal:      Palpations: Abdomen is soft. Musculoskeletal:         General: No tenderness or deformity. Normal range of motion. Cervical back: Normal range of motion and neck supple. Skin:     General: Skin is warm and dry. Coloration: Skin is not pale. Findings: No erythema or rash. Neurological:      Mental Status: She is alert and oriented to person, place, and time.       GCS: GCS eye subscore is 4. GCS verbal subscore is 5. GCS motor subscore is 6. Gait: Gait is intact. Gait normal.   Psychiatric:         Speech: Speech normal.         Behavior: Behavior normal.         Thought Content: Thought content normal.         Judgment: Judgment normal.           Assessment:         1. Mood disorder (HCC)    2. Paroxysmal atrial fibrillation ([address]    3. S/P ablation of atrial fibrillation    4. IFG (impaired fasting glucose)    5. Thyroid nodule, Rt. Lobe. 27 mm    6. Encounter for screening mammogram for malignant neoplasm of breast    7. Post-menopausal    8. Immunization due        Plan:     1. Mood disorder ([address]    Doing well on Wellbutrin without adverse side effects, continue med    2. Paroxysmal atrial fibrillation (HCC)    - CBC with Auto Differential; Future  - Comprehensive Metabolic Panel; Future  - TSH with Reflex; Future    Follows w/ Cardiology s/p ablation     3. S/P ablation of atrial fibrillation      4. IFG (impaired fasting glucose)    - Hemoglobin A1C; Future  - Lipid Panel; Future    5. Thyroid nodule, Rt. Lobe. 27 mm    - TSH with Reflex; Future    Encouraged Endo follow-up for nodule monitoring     6. Encounter for screening mammogram for malignant neoplasm of breast    - LILLIAM JOSE DIGITAL SCREEN BILATERAL; Future    7. Post-menopausal    - DEXA BONE DENSITY 2 SITES; Future    8. Immunization due    - Pneumococcal conjugate vaccine 13-valent      Discussed use, benefit, and side effects of prescribed medications. All patient questions answered. Pt voiced understanding. Reviewed health maintenance. Instructed to continue current medications, diet and exercise. Patient agreedwith treatment plan. Follow up as directed.      Electronically signed by DESMOND Jewell CNP on 3/15/2022

## 2022-03-15 NOTE — PATIENT INSTRUCTIONS
Patient Education        pneumococcal 13-valent conjugate vaccine  Pronunciation:  ARELY nany SEDRICK al 13-VAY lent KIMBRELEY sheppard VAX een  Brand:  Prevnar 15  What is the most important information I should know about this vaccine? For children, the pneumococcal 13-valent vaccine is given in a series of shots. The first shot is usually given when the child is 3 months old. The booster shots are then given at 4 months, 6 months, and 15to 13months of age. Adults usually receive only one dose of the vaccine. In a child older than 6 months who has not yet received this vaccine, the first dose can be given any time from the age of 10 months through 11 years (before the 7th birthday). If the child is less than 3year old at the time of the first shot, he or she will need 2 booster doses. If the child is 15 to 22 months old at the time of the first shot, he or she will need 1 booster dose. A child who is 2 years or older at the time of the first shot may need only the one shot and no booster doses. The timing of this vaccination is very important for it to be effective. Your child's individual booster schedule may be different from these guidelines. Follow your doctor's instructions or the schedule recommended by the health department of the state you live in. Keep track of any and all side effects your child has after receiving this vaccine. When the child receives a booster dose, you will need to tell the doctor if the previous shot caused any side effects. You can still receive a vaccine if you have a minor cold. In the case of a more severe illness with a fever or any type of infection, wait until you get better before receiving this vaccine. Becoming infected with pneumococcal disease (such as pneumonia or meningitis) is much more dangerous to your health than receiving this vaccine. However, like any medicine, this vaccine can cause side effects but the risk of serious side effects is extremely low.   Be sure to keep your child on a regular schedule for other immunizations against diseases such as diphtheria, tetanus, pertussis (whooping cough), measles, mumps, hepatitis, or varicella (chicken pox). Your doctor or state health department can provide you with a recommended immunization schedule. What is pneumococcal 13-valent conjugate vaccine? Pneumococcal disease is a serious infection caused by a bacteria. Pneumococcal bacteria can infect the sinuses and inner ear. It can also infect the lungs, blood, and brain, and these conditions can be fatal.  Pneumococcal 13-valent vaccine is used to prevent infection caused by pneumococcal bacteria. This vaccine contains 13 different types of pneumococcal bacteria. Pneumococcal 13-valent vaccine works by exposing you to a small amount of the bacteria or a protein from the bacteria, which causes the body to develop immunity to the disease. This vaccine will not treat an active infection that has already developed in the body. Pneumococcal 13-valent vaccine is for use in children from 6 weeks to 11years old, and in adults who are 48 and older. Becoming infected with pneumococcal disease (such as pneumonia or meningitis) is much more dangerous to your health than receiving this vaccine. However, like any medicine, this vaccine can cause side effects but the risk of serious side effects is extremely low. Like any vaccine, pneumococcal 13-valent vaccine may not provide protection from disease in every person. What should I discuss with my healthcare provider before receiving this vaccine? Keep track of any and all side effects your child has after receiving this vaccine. When the child receives a booster dose, you will need to tell the doctor if the previous shot caused any side effects. You should not receive this vaccine if you ever had a severe allergic reaction to a pneumococcal or diphtheria vaccine.   Before your child receives this vaccine, tell your doctor if the child was born prematurely. To make sure you or your child can safely receive this vaccine, tell your doctor if you or your child have any of these other conditions:  · a bleeding or blood clotting disorder such as hemophilia or easy bruising; or  · a weak immune system caused by disease, bone marrow transplant, or by using certain medicines or receiving cancer treatments. You can still receive a vaccine if you have a minor cold. In the case of a more severe illness with a fever or any type of infection, wait until you get better before receiving this vaccine. How is this vaccine given? This vaccine is injected into a muscle. You will receive this injection in a doctor's office or clinic setting. For children, the pneumococcal 13-valent vaccine is given in a series of shots. The first shot is usually given when the child is 3 months old. The booster shots are then given at 4 months, 6 months, and 15to 13months of age. Adults usually receive only one dose of the vaccine. The first injection should be given no earlier than 10weeks of age. Allow at least 2 months to pass between injections. If your child is older than 6 months, he or she can still receive this vaccine on the following schedule:  · Age 7-11 months: two injections at least 4 weeks apart, followed by a third injection after the child turns 1 year (at least 2 months after the second injection); · Age 12-23 months: two injections at least 2 months apart;  · Age 19 months to 5 years (before the 7th birthday): one injection. The timing of this vaccination is very important for it to be effective. Your child's individual booster schedule may be different from these guidelines. Follow your doctor's instructions or the schedule recommended by the health department of the state you live in.   Your doctor may recommend treating fever and pain with an aspirin-free pain reliever such as acetaminophen (Tylenol) or ibuprofen (Motrin, Advil, and others) when the shot is given and for the next 24 hours. Follow the label directions or your doctor's instructions about how much of this medicine to give your child. It is especially important to prevent fever from occurring in a child who has a seizure disorder such as epilepsy. Be sure to keep your child on a regular schedule for other immunizations such as diphtheria, tetanus, pertussis (whooping cough), hepatitis, and varicella (chicken pox). Your doctor or state health department can provide you with a recommended immunization schedule. What happens if I miss a dose? Contact your doctor if your child will miss a booster dose or gets behind schedule. The next dose should be given as soon as possible. There is no need to start over. Be sure your child receives all recommended doses of this vaccine. If your child does not receive the full series of vaccines, he or she may not be fully protected against the disease. What happens if I overdose? An overdose of this vaccine is unlikely to occur. What should I avoid before or after receiving this vaccine? Follow your doctor's instructions about any restrictions on food, beverages, or activity. What are the possible side effects of this vaccine? Your child should not receive a booster vaccine if he or she had a life-threatening allergic reaction after the first shot. Keep track of any and all side effects your child has after receiving this vaccine. When the child receives a booster dose, you will need to tell the doctor if the previous shot caused any side effects. Get emergency medical help if your child has any of these signs of an allergic reaction: hives; difficulty breathing; swelling of the face, lips, tongue, or throat.   Call your doctor at once if you or your child has a serious side effect such as:  · high fever (103 degrees or higher);  · seizure (convulsions);  · wheezing, trouble breathing;  · severe stomach pain, severe vomiting or diarrhea;  · easy bruising or bleeding; or  · severe pain, itching, irritation, or skin changes where the shot was given. Less serious side effects include  · crying, fussiness;  · headache, tired feeling;  · muscle or joint pain;  · drowsiness, sleeping more or less than usual;  · mild redness, swelling, tenderness, or a hard lump where the shot was given;  · loss of appetite, mild vomiting or diarrhea;  · low fever (102 degrees or less), chills; or  · mild skin rash. This is not a complete list of side effects and others may occur. Call your doctor for medical advice about side effects. You may report vaccine side effects to the [address] [place] [address] [address] and Human Services at [phone]. What other drugs will affect this vaccine? Before receiving this vaccine, tell the doctor about all other vaccines you or your child have recently received. Also tell the doctor if you or your child have recently received drugs or treatments that can weaken the immune system, including:  · an oral, nasal, inhaled, or injectable steroid medicine;  · chemotherapy or radiation;  · medications to treat psoriasis, rheumatoid arthritis, or other autoimmune disorders, such as azathioprine (Imuran), etanercept (Enbrel), leflunomide ([address]), and others; or  · medicines to treat or prevent organ transplant rejection, such as basiliximab (Simulect), cyclosporine (Sandimmune, Neoral, Gengraf), muromonab CD3 (Orthoclone), mycophenolate mofetil (CellCept), sirolimus (Rapamune), or tacrolimus (Prograf). If you are using any of these medications, you may not be able to receive the vaccine, or may need to wait until the other treatments are finished. There may be other drugs that can interact with pneumococcal 13-valent vaccine. Tell your doctor about all medications you use. This includes prescription, over-the-counter, vitamin, and herbal products. Do not start a new medication without telling your doctor. Where can I get more information?   Your doctor or pharmacist can provide more information about this vaccine. Additional information is available from your local health department or the Centers for Disease Control and Prevention. Remember, keep this and all other medicines out of the reach of children, never share your medicines with others, and use this medication only for the indication prescribed. Every effort has been made to ensure that the information provided by [address] [place] [address]  is accurate, up-to-date, and complete, but no guarantee is made to that effect. Drug information contained herein may be time sensitive. [facility] information has been compiled for use by healthcare practitioners and consumers in the [place] and therefore [facility] does not warrant that uses outside of the [place] are appropriate, unless specifically indicated otherwise. [facility]'s drug information does not endorse drugs, diagnose patients or recommend therapy. [facility]'s drug information is an informational resource designed to assist licensed healthcare practitioners in caring for their patients and/or to serve consumers viewing this service as a supplement to, and not a substitute for, the expertise, skill, knowledge and judgment of healthcare practitioners. The absence of a warning for a given drug or drug combination in no way should be construed to indicate that the drug or drug combination is safe, effective or appropriate for any given patient. [facility] does not assume any responsibility for any aspect of healthcare administered with the aid of information [facility] provides. The information contained herein is not intended to cover all possible uses, directions, precautions, warnings, drug interactions, allergic reactions, or adverse effects. If you have questions about the drugs you are taking, check with your doctor, nurse or pharmacist.  Copyright 6902-3073 [address]: 4.01. Revision date: 1/16/2012.   Care instructions adapted under license by [address] If you have questions about a medical condition or this instruction, always ask your healthcare professional. [place] [address] any warranty or liability for your use of this information.

## 2022-03-18 ENCOUNTER — OFFICE VISIT (OUTPATIENT)
Dept: BEHAVIORAL/MENTAL HEALTH CLINIC | Age: 67
End: 2022-03-18
Payer: MEDICARE

## 2022-03-18 DIAGNOSIS — F33.0 MAJOR DEPRESSIVE DISORDER, RECURRENT EPISODE, MILD WITH ANXIOUS DISTRESS (HCC): Primary | ICD-10-CM

## 2022-03-18 PROCEDURE — 90791 PSYCH DIAGNOSTIC EVALUATION: CPT | Performed by: PSYCHOLOGIST
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
## 2022-03-24 ENCOUNTER — HOSPITAL ENCOUNTER (OUTPATIENT)
Dept: SLEEP CENTER | Age: 67
Discharge: HOME | End: 2022-03-26
Payer: MEDICARE

## 2022-03-24 PROCEDURE — 95811 POLYSOM 6/>YRS CPAP 4/> PARM: CPT

## 2022-04-05 ENCOUNTER — OFFICE VISIT (OUTPATIENT)
Dept: BEHAVIORAL/MENTAL HEALTH CLINIC | Age: 67
End: 2022-04-05
Payer: MEDICARE

## 2022-04-05 DIAGNOSIS — F33.0 MAJOR DEPRESSIVE DISORDER, RECURRENT EPISODE, MILD WITH ANXIOUS DISTRESS (HCC): Primary | ICD-10-CM

## 2022-04-05 PROCEDURE — 1036F TOBACCO NON-USER: CPT | Performed by: PSYCHOLOGIST

## 2022-04-05 PROCEDURE — 90837 PSYTX W PT 60 MINUTES: CPT | Performed by: PSYCHOLOGIST

## 2022-04-05 NOTE — PROGRESS NOTES
Marcio Drummond,  Ph.D.   Licensed Clinical Psychologist ([phone])    Visit Date: 4/5/2022   Time of appointment:  3:05p - 4:05p   Time spent with Patient: 60 minutes. This is patient's second appointment. Reason for Consult:  Depression and Anxiety     Referring Provider/PCP:    No ref. provider found  Ileana Manual, APRN - CNP      Pt provided informed consent for the behavioral health program. Discussed with patient model of service to include the limits of confidentiality (i.e. abuse reporting, suicide intervention, etc.) and short-term intervention focused approach. Pt indicated understanding. Edward Bailey is a 74 y.o. female who presents for follow up of depression and anxiety. She reported that she did attend her sleep study follow-up appointment on 3/24/22. She stated that they were able to fit her with a mask that worked well for her and she was able to [employer] like a log\" in the sleep lab using that mask with the CPAP. She reported that she will hopefully get her CPAP machine to start using at home sometime this month and she is hoping this will help improve her sleep. Norma James reported that she believes she has continued to do better in not worrying as much and being less negative. However, she stated that she continues to struggle with \"getting easily worked up,\" even by things she recognizes are relatively unimportant or out of her control, particularly if situations are unexpected. For example, she recently became very angry and irritable with herself for not being able to finish all of her yard work despite recognizing that she completed a large portion of the job and that she was experiencing too much pain in her back. She reported that she wants to learn not to get so \"worked up\" by things. When diaphragmatic breathing was explained and demonstrated, Norma James admitted that she has never really tried this breathing technique.   She reported that she is willing and eager to practice exercises that will help her to better manage stress. Previous Recommendations:   1)  Pt will attend her follow-up sleep clinic appointment on 3/24/22. 2)  Pt will return for a follow-up appointment with the [facility] in 3 weeks on 4/5/22 at [address] [place] [address] Street was anxious and dysthymic with congruent affect. Suicidal ideation was denied. Homicidal ideation was denied. Hygiene was good . Dress was appropriate. Behavior was Within Normal Limits with No observation or self-report of difficulties ambulating. Attitude was Cooperative. Eye-contact was good. Speech: rate - WNL, rhythm - WNL, volume - WNL. Verbalizations were goal directed and coherent. Thought processes were intact and goal-oriented without evidence of delusions, hallucinations, obsessions, or yoandy; with no cognitive distortions. Associations were characterized by intact cognitive processes. Pt was orientated oriented to person, place, time, and general circumstances;  recent:  good. Insight and judgment were estimated to be good, AEB, a good  understanding of cyclical maladaptive patterns, and the ability to use insight to inform behavior change. ASSESSMENT  Mar Grande presented to the appointment today for evaluation and treatment of symptoms of depression and anxiety. She is currently deemed low risk to herself or others and meets criteria for MDD, Recurrent, Mild with anxious distress. She responded well to interventions, including providing psychoeducation on the nervous system and anxiety, demonstrating diaphragmatic breathing, and modeling and practicing more balanced, adaptive coping talk/thinking. Adarsh Rudolph was in agreement with recommendations.     PHQ Scores 3/15/2022 1/14/2022 12/10/2021 8/13/2021 8/6/2021 7/26/2021 5/22/2018   PHQ2 Score 0 2 2 0 0 2 6   PHQ9 Score 3 2 2 0 0 2 22     Interpretation of Total Score Depression Severity: 1-4 = Minimal depression, 5-9 = Mild depression, 10-14 = Moderate depression, 15-19 = Moderately severe depression, 20-27 = Severe depression    How often pt has had thoughts of death or hurting self (if PHQ positive for depression):       No flowsheet data found. Interpretation of BELEN-7 score: 5-9 = mild anxiety, 10-14 = moderate anxiety, 15+ = severe anxiety. Recommend referral to behavioral health for scores 10 or greater. DIAGNOSIS  Elizabeth Cardoso was seen today for depression and anxiety. Diagnoses and all orders for this visit:    Major depressive disorder, recurrent episode, mild with anxious distress ([facility] [address]          INTERVENTION  Trained in strategies for increasing balanced thinking, Trained in relaxation strategies, Discussed self-care (sleep, nutrition, rewarding activities, social support, exercise), Supportive techniques, Emphasized self-care as important for managing overall health, Provided Psychoeducation re: anxiety and the nervous system, CBT to target distress management, Identified maladaptive thoughts, Explained relaxed breathing technique in detail and practiced this with pt in visit and Emphasized importance of regular practice of relaxation strategies to target / promote distress management      PLAN  1)  Pt will practice more balanced, adaptive coping talk/thinking as well as relaxation and stress management exercises, including diaphragmatic breathing and meditation. 2)  Pt will return for a follow-up appointment with the [facility] in 3 weeks on 4/26/22 at [address] [place] [address]  Is interactive complexity present?   No  Reason:  N/A  Additional Supporting Information:  N/A       Electronically signed by Keegan Iglesias, PhD on 4/5/22 at 3:03 PM EDT

## 2022-04-08 ENCOUNTER — OFFICE VISIT (OUTPATIENT)
Dept: GASTROENTEROLOGY | Age: 67
End: 2022-04-08
Payer: MEDICARE

## 2022-04-08 VITALS
BODY MASS INDEX: 33.12 KG/M2 | HEART RATE: 89 BPM | SYSTOLIC BLOOD PRESSURE: 132 MMHG | DIASTOLIC BLOOD PRESSURE: 76 MMHG | OXYGEN SATURATION: 94 % | TEMPERATURE: 97.4 F | WEIGHT: 199 LBS

## 2022-04-08 DIAGNOSIS — K58.0 IRRITABLE BOWEL SYNDROME WITH DIARRHEA: Primary | ICD-10-CM

## 2022-04-08 PROCEDURE — G8400 PT W/DXA NO RESULTS DOC: HCPCS | Performed by: INTERNAL MEDICINE

## 2022-04-08 PROCEDURE — 1036F TOBACCO NON-USER: CPT | Performed by: INTERNAL MEDICINE

## 2022-04-08 PROCEDURE — 3017F COLORECTAL CA SCREEN DOC REV: CPT | Performed by: INTERNAL MEDICINE

## 2022-04-08 PROCEDURE — 1090F PRES/ABSN URINE INCON ASSESS: CPT | Performed by: INTERNAL MEDICINE

## 2022-04-08 PROCEDURE — 4040F PNEUMOC VAC/ADMIN/RCVD: CPT | Performed by: INTERNAL MEDICINE

## 2022-04-08 PROCEDURE — 1123F ACP DISCUSS/DSCN MKR DOCD: CPT | Performed by: INTERNAL MEDICINE

## 2022-04-08 PROCEDURE — 99213 OFFICE O/P EST LOW 20 MIN: CPT | Performed by: INTERNAL MEDICINE

## 2022-04-08 PROCEDURE — G8427 DOCREV CUR MEDS BY ELIG CLIN: HCPCS | Performed by: INTERNAL MEDICINE

## 2022-04-08 PROCEDURE — G8417 CALC BMI ABV UP PARAM F/U: HCPCS | Performed by: INTERNAL MEDICINE

## 2022-04-08 ASSESSMENT — ENCOUNTER SYMPTOMS
COLOR CHANGE: 0
ABDOMINAL DISTENTION: 0
RECTAL PAIN: 0
DIARRHEA: 0
NAUSEA: 0
BACK PAIN: 1
VOMITING: 0
SORE THROAT: 0
CONSTIPATION: 0
SHORTNESS OF BREATH: 0
COUGH: 0
BLOOD IN STOOL: 0
VOICE CHANGE: 0
WHEEZING: 0
ANAL BLEEDING: 0
ABDOMINAL PAIN: 0

## 2022-04-08 NOTE — PROGRESS NOTES
GI OFFICE FOLLOW UP    INTERVAL HISTORY:   No referring provider defined for this encounter. Chief Complaint   Patient presents with    Follow-up     Patient is here today to f/u on IBS w/diarrhea       1. Irritable bowel syndrome with diarrhea              HISTORY OF PRESENT ILLNESS: Ms.Mary Shanta Romero is a 74 y.o. female with a past history remarkable for ,   Patient seen for follow-up of IBS. This patient had EGD and colonoscopy done in September 2021 and did not reveal abnormalities. On further discussion she is doing reasonably well. Her bowel movements are satisfactory. Abdominal distention bloating resolved. No dysphagia or dyspepsia. No weight loss. In the past she was diagnosed to have IBS and also Candida esophagitis. After treatment, she denies GERD symptoms or dysphagia. She also had stressful lifestyle which appears to be resolved. Past Medical,Family, and Social History reviewed and does contribute to the patient presenting condition. Patient's PMH/PSH,SH,PSYCH Hx, MEDs, ALLERGIES, and ROS were all reviewed and updated in the appropriate sections. Yes      PAST MEDICAL HISTORY:  Past Medical History:   Diagnosis Date    A-fib [facility])     Anxiety     Atrial fibrillation ([address] 08/21/2018    Degenerative disc disease, lumbar 05/2018    Depression     Frequent UTI     Infertility, female     MIRELLA (obstructive sleep apnea) 1/14/2022    Osteoarthritis     Restless leg syndrome     Squamous cell carcinoma in situ     Thyroid nodule, Rt. Lobe.  27 mm     Urge incontinence 07/07/2020       Past Surgical History:   Procedure Laterality Date    CATARACT REMOVAL  2007    right    COLONOSCOPY N/A 9/30/2021    COLONOSCOPY DIAGNOSTIC WITH BIOPSY performed by Jonah Sales MD at [address]      \" as a child\"    EYE SURGERY  2013    cateract removal    TONSILLECTOMY Bilateral 1963    TONSILLECTOMY AND ADENOIDECTOMY      UPPER GASTROINTESTINAL ENDOSCOPY N/A 2021    EGD ESOPHAGOGASTRODUODENOSCOPY performed by Tavia Rico MD at [address]:    Current Outpatient Medications:     buPROPion (WELLBUTRIN XL) 300 MG extended release tablet, Take 1 tablet by mouth every morning, Disp: 90 tablet, Rfl: 1    verapamil (CALAN SR) 120 MG extended release tablet, Take 1 tablet by mouth daily, Disp: 90 tablet, Rfl: 2    butalbital-acetaminophen-caffeine (FIORICET, ESGIC) -40 MG per tablet, Take one tab every other day as needed for severe headache, Disp: 40 tablet, Rfl: 0    temazepam (RESTORIL) 15 MG capsule, TAKE 1 CAPSULE BY MOUTH AT BEDTIME, Disp: , Rfl: 2    LYRICA 200 MG capsule, Take 2 capsules by mouth daily.  , Disp: , Rfl: 5    rOPINIRole (REQUIP) 1 MG tablet, Take 1 tablet by mouth 4 times daily, Disp: 90 tablet, Rfl: 3    Multiple Vitamin (MULTIVITAMIN) tablet, Take 1 tablet by mouth daily, Disp: 30 tablet, Rfl: 3    ALLERGIES:   Allergies   Allergen Reactions    Pcn [Penicillins] Other (See Comments)     \" as a child\"      Neomycin Rash    Penicillin G Rash       FAMILY HISTORY:       Problem Relation Age of Onset    Cancer Mother         pancreatic    Cancer Father     Arthritis Sister     Hypertension Mother     Osteoporosis Mother     Diabetes Paternal Grandmother     Lung Cancer Father          SOCIAL HISTORY:   Social History     Socioeconomic History    Marital status: Single     Spouse name: Not on file    Number of children: Not on file    Years of education: 15    Highest education level: Not on file   Occupational History    Occupation: [address]     Employer: [employer]   Tobacco Use    Smoking status: Former Smoker     Packs/day: 2.00     Years: 3.00     Pack years: 6.00     Types: Cigarettes     Quit date: 2021     Years since quittin.8  Smokeless tobacco: Never Used    Tobacco comment: quit 2 months ago, started again   Vaping Use    Vaping Use: Former   Substance and Sexual Activity    Alcohol use: No     Comment: rarely    Drug use: No    Sexual activity: Never     Partners: Male   Other Topics Concern    Not on file   Social History Narrative    ** Merged History Encounter **          Social Determinants of Health     Financial Resource Strain: Low Risk     Difficulty of Paying Living Expenses: Not hard at all   Food Insecurity: No Food Insecurity    Worried About [address] [place] [address] in the Last Year: Never true    Pedro of Food in the Last Year: Never true   Transportation Needs: No Transportation Needs    Lack of Transportation (Medical): No    Lack of Transportation (Non-Medical): No   Physical Activity:     Days of Exercise per Week: Not on file    Minutes of Exercise per Session: Not on file   Stress:     Feeling of Stress : Not on file   Social Connections:     Frequency of Communication with Friends and Family: Not on file    Frequency of Social Gatherings with Friends and Family: Not on file    Attends Restorationist Services: Not on file    Active Member of [address] [place] [address] or Organizations: Not on file    Attends Club or Organization Meetings: Not on file    Marital Status: Not on file   Intimate Partner Violence:     Fear of Current or Ex-Partner: Not on file    Emotionally Abused: Not on file    Physically Abused: Not on file    Sexually Abused: Not on file   Housing Stability:     Unable to Pay for Housing in the Last Year: Not on file    Number of [place] in the Last Year: Not on file    Unstable Housing in the Last Year: Not on file         REVIEW OF SYSTEMS:         Review of Systems   Constitutional: Negative for unexpected weight change. HENT: Negative for sore throat and voice change. Eyes: Negative for visual disturbance. Respiratory: Negative for cough, shortness of breath and wheezing. Cardiovascular: Negative for chest pain and palpitations. Gastrointestinal: Negative for abdominal distention, abdominal pain, anal bleeding, blood in stool, constipation, diarrhea, nausea, rectal pain and vomiting. Genitourinary: Negative for difficulty urinating. Musculoskeletal: Positive for back pain. Negative for joint swelling. Skin: Negative for color change, rash and wound. Neurological: Positive for headaches. Negative for dizziness, weakness and light-headedness. Hematological: Bruises/bleeds easily. Psychiatric/Behavioral: Positive for sleep disturbance. The patient is not nervous/anxious. PHYSICAL EXAMINATION:     Vital signs reviewed per the nursing documentation. /76   Pulse 89   Temp 97.4 °F (36.3 °C)   Wt 199 lb (90.3 kg)   SpO2 94%   BMI 33.12 kg/m²   Body mass index is 33.12 kg/m². Physical Exam  Vitals and nursing note reviewed. Constitutional:       Appearance: Normal appearance. She is well-developed. Comments: Moderately overweight patient. HENT:      Head: Normocephalic and atraumatic. Eyes:      Extraocular Movements: Extraocular movements intact. Conjunctiva/sclera: Conjunctivae normal.   Neck:      Thyroid: No thyromegaly. Vascular: No hepatojugular reflux or JVD. Trachea: No tracheal deviation. Cardiovascular:      Rate and Rhythm: Normal rate and regular rhythm. Heart sounds: Normal heart sounds. Pulmonary:      Effort: Pulmonary effort is normal. No respiratory distress. Breath sounds: Normal breath sounds. No wheezing or rales. Abdominal:      General: Bowel sounds are normal. There is no distension. Palpations: Abdomen is soft. There is no hepatomegaly or mass. Tenderness: There is no abdominal tenderness. There is no rebound. Hernia: No hernia is present. Musculoskeletal:         General: No tenderness. Lymphadenopathy:      Cervical: No cervical adenopathy.    Skin:     General: Skin is warm and dry. Findings: No bruising, ecchymosis, erythema or rash. Neurological:      Mental Status: She is alert and oriented to person, place, and time. Cranial Nerves: No cranial nerve deficit. Psychiatric:         Mood and Affect: Mood normal.         Behavior: Behavior normal.         Thought Content: Thought content normal.           LABORATORY DATA: Reviewed  Lab Results   Component Value Date    WBC 8.8 08/13/2021    HGB 15.5 08/13/2021    HCT 46.8 (H) 08/13/2021    MCV 96.1 08/13/2021     08/13/2021     08/13/2021    K 5.2 08/13/2021     08/13/2021    CO2 26 08/13/2021    BUN 13 08/13/2021    CREATININE 0.60 08/13/2021    LABPROT 6.4 03/21/2012    LABALBU 4.4 08/13/2021    BILITOT 0.20 (L) 08/13/2021    ALKPHOS 87 08/13/2021    AST 26 08/13/2021    ALT 18 08/13/2021    INR 1.1 08/21/2018         Lab Results   Component Value Date    RBC 4.87 08/13/2021    HGB 15.5 08/13/2021    MCV 96.1 08/13/2021    MCH 32.0 08/13/2021    MCHC 33.2 08/13/2021    RDW 13.5 08/13/2021    MPV 9.2 08/13/2021    BASOPCT 1 08/13/2021    LYMPHSABS 2.20 08/13/2021    MONOSABS 0.60 08/13/2021    NEUTROABS 5.90 08/13/2021    EOSABS 0.10 08/13/2021    BASOSABS 0.00 08/13/2021         DIAGNOSTIC TESTING:     No results found. Assessment  1. Irritable bowel syndrome with diarrhea        Plan  At present patient is stable. Her IBS symptoms appears to be in remission. Patient is reassured. She may not need further work-up at this time. Advised to see family physician for follow-up. Thank you for allowing me to participate in the care of Ms. Arora. For any further questions please do not hesitate to contact me. I have reviewed and agree with the ROS entered by the MA/LPN. Note is dictated utilizing voice recognition software. Unfortunately this leads to occasional typographical errors.  Please contact our office if you have any questions        Burak James MD,FACP, [facility]  Board Certified in Gastroenterology and [address] [facility]  Office #: [phone]

## 2022-04-12 ENCOUNTER — OFFICE VISIT (OUTPATIENT)
Dept: FAMILY MEDICINE CLINIC | Age: 67
End: 2022-04-12
Payer: MEDICARE

## 2022-04-12 VITALS
SYSTOLIC BLOOD PRESSURE: 120 MMHG | OXYGEN SATURATION: 97 % | HEART RATE: 77 BPM | WEIGHT: 196.8 LBS | TEMPERATURE: 97.5 F | BODY MASS INDEX: 32.75 KG/M2 | DIASTOLIC BLOOD PRESSURE: 84 MMHG

## 2022-04-12 DIAGNOSIS — N39.0 URINARY TRACT INFECTION WITHOUT HEMATURIA, SITE UNSPECIFIED: Primary | ICD-10-CM

## 2022-04-12 LAB
BILIRUBIN, POC: NORMAL
BLOOD URINE, POC: NORMAL
CLARITY, POC: NORMAL
COLOR, POC: YELLOW
GLUCOSE URINE, POC: NORMAL
KETONES, POC: NORMAL
LEUKOCYTE EST, POC: NORMAL
NITRITE, POC: NORMAL
PH, POC: 7
PROTEIN, POC: NORMAL
SPECIFIC GRAVITY, POC: 1.02
UROBILINOGEN, POC: 0.2

## 2022-04-12 PROCEDURE — G8427 DOCREV CUR MEDS BY ELIG CLIN: HCPCS | Performed by: NURSE PRACTITIONER

## 2022-04-12 PROCEDURE — G8417 CALC BMI ABV UP PARAM F/U: HCPCS | Performed by: NURSE PRACTITIONER

## 2022-04-12 PROCEDURE — G8400 PT W/DXA NO RESULTS DOC: HCPCS | Performed by: NURSE PRACTITIONER

## 2022-04-12 PROCEDURE — 1090F PRES/ABSN URINE INCON ASSESS: CPT | Performed by: NURSE PRACTITIONER

## 2022-04-12 PROCEDURE — 1036F TOBACCO NON-USER: CPT | Performed by: NURSE PRACTITIONER

## 2022-04-12 PROCEDURE — 1123F ACP DISCUSS/DSCN MKR DOCD: CPT | Performed by: NURSE PRACTITIONER

## 2022-04-12 PROCEDURE — 4040F PNEUMOC VAC/ADMIN/RCVD: CPT | Performed by: NURSE PRACTITIONER

## 2022-04-12 PROCEDURE — 3017F COLORECTAL CA SCREEN DOC REV: CPT | Performed by: NURSE PRACTITIONER

## 2022-04-12 PROCEDURE — 81003 URINALYSIS AUTO W/O SCOPE: CPT | Performed by: NURSE PRACTITIONER

## 2022-04-12 PROCEDURE — 99213 OFFICE O/P EST LOW 20 MIN: CPT | Performed by: NURSE PRACTITIONER

## 2022-04-12 RX ORDER — NITROFURANTOIN 25; 75 MG/1; MG/1
100 CAPSULE ORAL 2 TIMES DAILY
Qty: 14 CAPSULE | Refills: 0 | Status: SHIPPED | OUTPATIENT
Start: 2022-04-12 | End: 2022-04-19

## 2022-04-12 ASSESSMENT — ENCOUNTER SYMPTOMS
DIARRHEA: 0
COLOR CHANGE: 0
GASTROINTESTINAL NEGATIVE: 1
VOMITING: 0
ABDOMINAL PAIN: 0
CONSTIPATION: 0
BACK PAIN: 1
RESPIRATORY NEGATIVE: 1
NAUSEA: 0
EYES NEGATIVE: 1
ALLERGIC/IMMUNOLOGIC NEGATIVE: 1

## 2022-04-12 ASSESSMENT — PATIENT HEALTH QUESTIONNAIRE - PHQ9
SUM OF ALL RESPONSES TO PHQ9 QUESTIONS 1 & 2: 0
8. MOVING OR SPEAKING SO SLOWLY THAT OTHER PEOPLE COULD HAVE NOTICED. OR THE OPPOSITE, BEING SO FIGETY OR RESTLESS THAT YOU HAVE BEEN MOVING AROUND A LOT MORE THAN USUAL: 0
7. TROUBLE CONCENTRATING ON THINGS, SUCH AS READING THE NEWSPAPER OR WATCHING TELEVISION: 0
10. IF YOU CHECKED OFF ANY PROBLEMS, HOW DIFFICULT HAVE THESE PROBLEMS MADE IT FOR YOU TO DO YOUR WORK, TAKE CARE OF THINGS AT HOME, OR GET ALONG WITH OTHER PEOPLE: 0
SUM OF ALL RESPONSES TO PHQ QUESTIONS 1-9: 0
9. THOUGHTS THAT YOU WOULD BE BETTER OFF DEAD, OR OF HURTING YOURSELF: 0
SUM OF ALL RESPONSES TO PHQ QUESTIONS 1-9: 0
4. FEELING TIRED OR HAVING LITTLE ENERGY: 0
3. TROUBLE FALLING OR STAYING ASLEEP: 0
6. FEELING BAD ABOUT YOURSELF - OR THAT YOU ARE A FAILURE OR HAVE LET YOURSELF OR YOUR FAMILY DOWN: 0
5. POOR APPETITE OR OVEREATING: 0
1. LITTLE INTEREST OR PLEASURE IN DOING THINGS: 0
SUM OF ALL RESPONSES TO PHQ QUESTIONS 1-9: 0
2. FEELING DOWN, DEPRESSED OR HOPELESS: 0
SUM OF ALL RESPONSES TO PHQ QUESTIONS 1-9: 0
2. FEELING DOWN, DEPRESSED OR HOPELESS: 0
SUM OF ALL RESPONSES TO PHQ QUESTIONS 1-9: 0
SUM OF ALL RESPONSES TO PHQ QUESTIONS 1-9: 0

## 2022-04-12 NOTE — PROGRESS NOTES
[facility]  [address] [facility]  Dept: [address] is a 74 y.o. female who presents today for her medical conditions/complaintsas noted below. Quan Barfield is here today c/o Dysuria (started today) and Urinary Frequency    Past Medical History:   Diagnosis Date    A-fib [facility])     Anxiety     Atrial fibrillation ([address] 2018    Degenerative disc disease, lumbar 2018    Depression     Frequent UTI     Infertility, female     MIRELLA (obstructive sleep apnea) 2022    Osteoarthritis     Restless leg syndrome     Squamous cell carcinoma in situ     Thyroid nodule, Rt. Lobe.  27 mm     Urge incontinence 2020      Past Surgical History:   Procedure Laterality Date    CATARACT REMOVAL  2007    right    COLONOSCOPY N/A 2021    COLONOSCOPY DIAGNOSTIC WITH BIOPSY performed by Ramya Gutierrez MD at [address]      \" as a child\"   [address] SURGERY  2013    cateract removal    TONSILLECTOMY Bilateral 1963    TONSILLECTOMY AND ADENOIDECTOMY      UPPER GASTROINTESTINAL ENDOSCOPY N/A 2021    EGD ESOPHAGOGASTRODUODENOSCOPY performed by Ramya Gutierrez MD at [address] [facility] History   Problem Relation Age of Onset   Solomon Cancer Mother         pancreatic    Cancer Father     Arthritis Sister     Hypertension Mother     Osteoporosis Mother     Diabetes Paternal Grandmother     Lung Cancer Father        Social History     Tobacco Use    Smoking status: Former Smoker     Packs/day: 2.00     Years: 3.00     Pack years: 6.00     Types: Cigarettes     Quit date: 2021     Years since quittin.8    Smokeless tobacco: Never Used    Tobacco comment: quit 2 months ago, started again   Substance Use Topics    Alcohol use: No     Comment: rarely      Current Outpatient Medications   Medication Sig Dispense Refill    buPROPion (WELLBUTRIN XL) 300 MG extended release tablet Take 1 tablet by mouth every morning 90 tablet 1    verapamil (CALAN SR) 120 MG extended release tablet Take 1 tablet by mouth daily 90 tablet 2    butalbital-acetaminophen-caffeine (FIORICET, ESGIC) -40 MG per tablet Take one tab every other day as needed for severe headache 40 tablet 0    temazepam (RESTORIL) 15 MG capsule TAKE 1 CAPSULE BY MOUTH AT BEDTIME  2    LYRICA 200 MG capsule Take 2 capsules by mouth daily. 5    rOPINIRole (REQUIP) 1 MG tablet Take 1 tablet by mouth 4 times daily 90 tablet 3    Multiple Vitamin (MULTIVITAMIN) tablet Take 1 tablet by mouth daily 30 tablet 3     No current facility-administered medications for this visit. Allergies   Allergen Reactions    Pcn [Penicillins] Other (See Comments)     \" as a child\"      Neomycin Rash    Penicillin G Rash         HPI:     Urinary Tract Infection   This is a new problem. The current episode started today. The pain is at a severity of 0/10. The patient is experiencing no pain. There has been no fever. She is not sexually active. Associated symptoms include frequency and urgency. Pertinent negatives include no chills, discharge, flank pain, hematuria, hesitancy, nausea, sweats or vomiting. Treatments tried: Cranberry juice, cranberry supplement. The treatment provided no relief. Her past medical history is significant for recurrent UTIs. There is no history of catheterization, kidney stones, a single kidney, urinary stasis or a urological procedure. Health Maintenance:      Subjective:     Review of Systems   Constitutional: Positive for appetite change (dec.). Negative for chills, diaphoresis and fever. HENT: Negative. Eyes: Negative. Respiratory: Negative. Cardiovascular: Negative. Gastrointestinal: Negative. Negative for abdominal pain, constipation, diarrhea, nausea and vomiting. Endocrine: Negative. Genitourinary: Positive for dysuria, frequency and urgency.  Negative for difficulty urinating, flank pain, hematuria, hesitancy and vaginal discharge. Musculoskeletal: Positive for back pain. Negative for myalgias. Skin: Negative. Negative for color change, pallor, rash and wound. Allergic/Immunologic: Negative. Neurological: Positive for headaches. Hematological: Negative. Psychiatric/Behavioral: Negative. Objective:     Vitals:    04/12/22 1339   BP: 120/84   Pulse: 77   Temp: 97.5 °F (36.4 °C)   SpO2: 97%       Body mass index is 32.75 kg/m². Physical Exam  Constitutional:       General: She is not in acute distress. Appearance: Normal appearance. She is well-developed. She is obese. She is not ill-appearing, toxic-appearing or diaphoretic. HENT:      Head: Normocephalic and atraumatic. Right Ear: External ear normal.      Left Ear: External ear normal.      Nose: Nose normal.   Eyes:      General: No scleral icterus. Right eye: No discharge. Left eye: No discharge. Conjunctiva/sclera: Conjunctivae normal.      Pupils: Pupils are equal, round, and reactive to light. Neck:      Trachea: No tracheal deviation. Cardiovascular:      Rate and Rhythm: Normal rate and regular rhythm. Heart sounds: Normal heart sounds. No murmur heard. No friction rub. No gallop. Pulmonary:      Effort: Pulmonary effort is normal. No tachypnea, accessory muscle usage or respiratory distress. Breath sounds: Normal breath sounds. No stridor. No decreased breath sounds, wheezing, rhonchi or rales. Abdominal:      General: Bowel sounds are normal. There is no distension. Palpations: Abdomen is soft. Tenderness: There is no abdominal tenderness. There is no right CVA tenderness or left CVA tenderness. Musculoskeletal:         General: No tenderness or deformity. Normal range of motion. Cervical back: Normal range of motion and neck supple. Skin:     General: Skin is warm and dry. Coloration: Skin is not pale.       Findings: No erythema or rash. Neurological:      Mental Status: She is alert and oriented to person, place, and time. GCS: GCS eye subscore is 4. GCS verbal subscore is 5. GCS motor subscore is 6. Gait: Gait is intact. Gait normal.   Psychiatric:         Speech: Speech normal.         Behavior: Behavior normal.         Thought Content: Thought content normal.         Judgment: Judgment normal.           Assessment:         1. Urinary tract infection without hematuria, site unspecified        Plan:     1. Urinary tract infection without hematuria, site unspecified    - POCT Urinalysis No Micro (Auto)  - Culture, Urine; Future  - nitrofurantoin, macrocrystal-monohydrate, (MACROBID) 100 MG capsule; Take 1 capsule by mouth 2 times daily for 7 days  Dispense: 14 capsule; Refill: 0    Dip suspicious, send for culture  Treat with Macrobid   PUSH fluids  Follow-up if worsened or fails to resolve  Discussed Urology referral if recurrent UTI's persist     Discussed use, benefit, and side effects of prescribed medications. All patient questions answered. Pt voiced understanding. Reviewed health maintenance. Instructed to continue current medications, diet and exercise. Patient agreedwith treatment plan. Follow up as directed.      Electronically signed by DESMOND Nguyen CNP on 4/12/2022

## 2022-04-13 ENCOUNTER — HOSPITAL ENCOUNTER (OUTPATIENT)
Age: 67
Setting detail: SPECIMEN
Discharge: HOME | End: 2022-04-13

## 2022-04-13 DIAGNOSIS — N39.0 URINARY TRACT INFECTION WITHOUT HEMATURIA, SITE UNSPECIFIED: ICD-10-CM

## 2022-04-13 LAB — STATUS: NORMAL

## 2022-04-15 LAB
CULTURE: ABNORMAL
SPECIMEN DESCRIPTION: ABNORMAL

## 2022-04-26 ENCOUNTER — OFFICE VISIT (OUTPATIENT)
Dept: BEHAVIORAL/MENTAL HEALTH CLINIC | Age: 67
End: 2022-04-26
Payer: MEDICARE

## 2022-04-26 DIAGNOSIS — F33.0 MAJOR DEPRESSIVE DISORDER, RECURRENT EPISODE, MILD WITH ANXIOUS DISTRESS (HCC): Primary | ICD-10-CM

## 2022-04-26 PROCEDURE — 1036F TOBACCO NON-USER: CPT | Performed by: PSYCHOLOGIST

## 2022-04-26 PROCEDURE — 90837 PSYTX W PT 60 MINUTES: CPT | Performed by: PSYCHOLOGIST
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
## 2022-05-20 ENCOUNTER — OFFICE VISIT (OUTPATIENT)
Dept: BEHAVIORAL/MENTAL HEALTH CLINIC | Age: 67
End: 2022-05-20
Payer: MEDICARE

## 2022-05-20 DIAGNOSIS — F33.0 MAJOR DEPRESSIVE DISORDER, RECURRENT EPISODE, MILD WITH ANXIOUS DISTRESS (HCC): Primary | ICD-10-CM

## 2022-05-20 PROCEDURE — 90837 PSYTX W PT 60 MINUTES: CPT | Performed by: PSYCHOLOGIST

## 2022-05-20 PROCEDURE — 1036F TOBACCO NON-USER: CPT | Performed by: PSYCHOLOGIST

## 2022-05-20 PROCEDURE — 1123F ACP DISCUSS/DSCN MKR DOCD: CPT | Performed by: PSYCHOLOGIST

## 2022-05-20 NOTE — PROGRESS NOTES
Adithya Chowdhury,  Ph.D.   Licensed Clinical Psychologist ([phone])    Visit Date: 5/20/2022   Time of appointment:  3:02p - 4:02p   Time spent with Patient: 60 minutes. This is patient's fourth appointment. Reason for Consult:  Depression and Anxiety     Referring Provider/PCP:    No ref. provider found  Lorraine Jimenez, APRN - CNP      Pt provided informed consent for the behavioral health program. Discussed with patient model of service to include the limits of confidentiality (i.e. abuse reporting, suicide intervention, etc.) and short-term intervention focused approach. Pt indicated understanding. Gadiel Harrison is a 74 y.o. female who presents for follow up of depression and anxiety. She reported that she feels like a \"zombie\" today, especially as she did not get to sleep until 5AM last night. She reported that the reason for this is that she did not get home until very late because she was stuck with her sister all day after her sister drove her to [facility] for her hand appointment. She reported that the past 5 weeks of having to go all the way to [facility] for follow-ups on her hand every week has been very stressfull and overwhelming. She reported that she kept having complications with the wound from the surgery. She stated that each trip ended up becoming an \"all day affair\" with her sister and although she enjoys spending time with her, she reported that it has been exhausting spending that much time with her sister over the past 5 weeks. She reported that she has felt \"completely burned out by all these doctors visits. \"  She admitted that she considered cancelling today's [facility] appointment, but stated, \"Even if I don't want to do go, I'm just not the type of person who cancels appointments, I don't do that. \"  She reported that yesterday was at least the last follow-up visit for her hand since it's now fully healed.   However, she stated that she has another couple medical visits next week. She stated, \"I need a vacation from my life, I'm just so tired. \"  However, she reported that she also feels bad for being so irritated by her medical appointments given that she does not have any major chronic health issues or has to go to the doctor as much as some other people around her age. Natalie Haynes also reported that her house has become a \"complete disaster\" over the past few months. She stated that this is very stressful for her because she is such an organized person. She stated that when her house is a mess, she feels like a mess herself and it makes her unhappy. She reported that she wants to work on cleaning and organizing to help boost her mood. Natalie Haynes reported that one positive over the past few weeks is that she has had no issues with \"rage episodes\" (as she described in the last session). She stated that she feels like she is \"over it\" now, especially because she does not feel she has the energy for it anymore. Previous Recommendations:   1)  Pt will practice more balanced, adaptive coping talk/thinking as well as relaxation and stress management exercises, including diaphragmatic breathing and meditation. In particular, she will try the YouTube videos on deep breathing and meditation sent to her via text.    2)  Pt will return for a follow-up appointment with the [facility] in 3 weeks on 5/17/22 at [address] [place], [place] [address] was anxious, dysthymic and irritable with congruent affect. Suicidal ideation was denied. Homicidal ideation was denied. Hygiene was good . Dress was appropriate. Behavior was Within Normal Limits with No observation or self-report of difficulties ambulating. Attitude was Cooperative. Eye-contact was good. Speech: rate - WNL, rhythm - WNL, volume - WNL. Verbalizations were goal directed and coherent.   Thought processes were intact and goal-oriented without evidence of delusions, hallucinations, obsessions, or yoandy; with no cognitive distortions. Associations were characterized by intact cognitive processes. Pt was orientated oriented to person, place, time, and general circumstances;  recent:  good. Insight and judgment were estimated to be fair, AEB, a fair  understanding of cyclical maladaptive patterns, and the ability to use insight to inform behavior change. ASSESSMENT  Mar Tomas presented to the appointment today for evaluation and treatment of symptoms of depression and anxiety. She is currently deemed low risk to herself or others and meets criteria for MDD, Recurrent, Mild with anxious distress. She responded relatively well to interventions, including challenging and reframing maladaptive thoughts/beliefs, providing emotional support and validation, and discussing more effective ways to start working on cleaning and organization around her house. Britton Edwards was in agreement with recommendations. PHQ Scores 4/12/2022 4/12/2022 3/15/2022 1/14/2022 12/10/2021 8/13/2021 8/6/2021   PHQ2 Score - 0 0 2 2 0 0   PHQ9 Score 0 0 3 2 2 0 0     Interpretation of Total Score Depression Severity: 1-4 = Minimal depression, 5-9 = Mild depression, 10-14 = Moderate depression, 15-19 = Moderately severe depression, 20-27 = Severe depression    How often pt has had thoughts of death or hurting self (if PHQ positive for depression):       No flowsheet data found. Interpretation of BELEN-7 score: 5-9 = mild anxiety, 10-14 = moderate anxiety, 15+ = severe anxiety. Recommend referral to behavioral health for scores 10 or greater. DIAGNOSIS  Britton Edwards was seen today for depression and anxiety.     Diagnoses and all orders for this visit:    Major depressive disorder, recurrent episode, mild with anxious distress ([facility] [address]          INTERVENTION  Trained in strategies for increasing balanced thinking, Discussed self-care (sleep, nutrition, rewarding activities, social support, exercise), Supportive techniques, Emphasized self-care as important for managing overall health, CBT to target maladaptive thoughts/schemas, Identified maladaptive thoughts and Problem-solving re: ways to more effectively accomplish cleaning and organization in her house      PLAN  1)  Pt will practice more balanced, adaptive coping talk/thinking as well as relaxation and stress management exercises, including diaphragmatic breathing and meditation. 2)  Pt will work on breaking up cleaning and organization tasks into smaller, realistic goals she can accomplish. 3)  Pt will return for a follow-up appointment with the [facility] in 3.5 weeks on 6/14/22 at [address] [place] [address]  Is interactive complexity present?   No  Reason:  N/A  Additional Supporting Information:  N/A       Electronically signed by Mic Saha, PhD on 5/20/22 at 2:51 PM EDT

## 2022-05-24 ENCOUNTER — OFFICE VISIT (OUTPATIENT)
Dept: NEUROLOGY | Age: 67
End: 2022-05-24
Payer: MEDICARE

## 2022-05-24 VITALS
BODY MASS INDEX: 32.82 KG/M2 | SYSTOLIC BLOOD PRESSURE: 113 MMHG | HEART RATE: 72 BPM | DIASTOLIC BLOOD PRESSURE: 77 MMHG | HEIGHT: 65 IN | WEIGHT: 197 LBS

## 2022-05-24 DIAGNOSIS — G43.009 MIGRAINE WITHOUT AURA AND WITHOUT STATUS MIGRAINOSUS, NOT INTRACTABLE: ICD-10-CM

## 2022-05-24 DIAGNOSIS — G25.81 RLS (RESTLESS LEGS SYNDROME): Primary | ICD-10-CM

## 2022-05-24 PROCEDURE — 1123F ACP DISCUSS/DSCN MKR DOCD: CPT | Performed by: NURSE PRACTITIONER

## 2022-05-24 PROCEDURE — G8427 DOCREV CUR MEDS BY ELIG CLIN: HCPCS | Performed by: NURSE PRACTITIONER

## 2022-05-24 PROCEDURE — 1090F PRES/ABSN URINE INCON ASSESS: CPT | Performed by: NURSE PRACTITIONER

## 2022-05-24 PROCEDURE — 99214 OFFICE O/P EST MOD 30 MIN: CPT | Performed by: NURSE PRACTITIONER

## 2022-05-24 PROCEDURE — G8400 PT W/DXA NO RESULTS DOC: HCPCS | Performed by: NURSE PRACTITIONER

## 2022-05-24 PROCEDURE — 1036F TOBACCO NON-USER: CPT | Performed by: NURSE PRACTITIONER

## 2022-05-24 PROCEDURE — 3017F COLORECTAL CA SCREEN DOC REV: CPT | Performed by: NURSE PRACTITIONER

## 2022-05-24 PROCEDURE — G8417 CALC BMI ABV UP PARAM F/U: HCPCS | Performed by: NURSE PRACTITIONER

## 2022-05-24 RX ORDER — TOPIRAMATE 25 MG/1
50 TABLET ORAL DAILY
Qty: 60 TABLET | Refills: 3 | Status: SHIPPED | OUTPATIENT
Start: 2022-05-24 | End: 2022-08-30 | Stop reason: ALTCHOICE

## 2022-05-24 RX ORDER — RIZATRIPTAN BENZOATE 10 MG/1
10 TABLET ORAL
Qty: 12 TABLET | Refills: 5 | Status: SHIPPED | OUTPATIENT
Start: 2022-05-24 | End: 2022-08-30

## 2022-05-24 NOTE — PROGRESS NOTES
[facility]            [address]          [place], [address] [facility]          Dept: [phone]          Dept Fax: [phone]    MD Carly Solis MD Ahmed B. Iven Due, MD Verena Soja, MD Eulah Locks, CNP            5/24/2022      HISTORY OF PRESENT ILLNESS:       I had the pleasure of seeing Dalton Darnell, who returns for continuing neurologic care. The patient was seen last on November 22, 2021 for treatment of chronic migraine headaches and a comorbid restless leg syndrome with sleep apnea. For prophylaxis of her migraine headaches she was prescribed verapamil 120 mg daily. For abortive therapy she was prescribed fioricet. She is here today reporting that her headaches have worsened prior to today's visit. Her headaches are right orbital in nature and are described as a throbbing sensation. These headaches are associated with nausea, photophobia and phonophobia and are a 7/10 in intensity. She is currently having approximately 10 migraine headaches/month. She reports that she has only had mild success with using fioricet for abortive therapy. Headache location: right orbital   Headache quality: throbbing  Associated factors:  Nausea, photophobia, phonophobia   Intensity: 7/10  Headache chronicity:   Headache frequency: 10/month  Aggravating factors : bright lights  Relieving factors: partially by sleep   Prophylactic medications: verapamil   Abortive medications: fioricet  Previously used medications: none      She also has a comorbid restless leg syndrome with sleep apnea and was prescribed requip and a CPAP machine by pulmonology. Testing reviewed:    MRI Brain (w/wo) (10/10/19): No acute infarct, intracranial hemorrhage or significant mass-effect. No evidence of abnormal intracranial enhancement. Mild amount of chronic small vessel ischemic white matter disease.   Mild diffuse T1 hypointensity involving clivus and upper cervical spine.          PAST MEDICAL HISTORY:         Diagnosis Date    A-fib [facility])     Anxiety     Atrial fibrillation (HCC) 2018    Degenerative disc disease, lumbar 2018    Depression     Frequent UTI     Infertility, female     MIRELLA (obstructive sleep apnea) 2022    Osteoarthritis     Restless leg syndrome     Squamous cell carcinoma in situ     Thyroid nodule, Rt. Lobe.  27 mm     Urge incontinence 2020        PAST SURGICAL HISTORY:         Procedure Laterality Date    CATARACT REMOVAL  2007    right    COLONOSCOPY N/A 2021    COLONOSCOPY DIAGNOSTIC WITH BIOPSY performed by Brodie Murrell MD at [address]      \" as a child\"    EYE SURGERY      cateract removal    TONSILLECTOMY Bilateral 1963    TONSILLECTOMY AND ADENOIDECTOMY      UPPER GASTROINTESTINAL ENDOSCOPY N/A 2021    EGD ESOPHAGOGASTRODUODENOSCOPY performed by Brodie Murrell MD at [address] [facility]:     Social History     Socioeconomic History    Marital status: Single     Spouse name: Not on file    Number of children: Not on file    Years of education: 15    Highest education level: Not on file   Occupational History    Occupation: [employer]     Employer: [employer]   Tobacco Use    Smoking status: Former Smoker     Packs/day: 2.00     Years: 3.00     Pack years: 6.00     Types: Cigarettes     Quit date: 2021     Years since quittin.0    Smokeless tobacco: Never Used    Tobacco comment: quit 2 months ago, started again   Vaping Use    Vaping Use: Former   Substance and Sexual Activity    Alcohol use: No     Comment: rarely    Drug use: No    Sexual activity: Never     Partners: Male   Other Topics Concern    Not on file   Social History Narrative    ** Merged History Encounter **          Social Determinants of Health     Financial Resource Strain: Low Risk     Difficulty of Paying Living Expenses: Not hard at all   Food Insecurity: No Food Insecurity    Worried About [address] [place] [address] in the Last Year: Never true    Ran Out of Food in the Last Year: Never true   Transportation Needs: No Transportation Needs    Lack of Transportation (Medical): No    Lack of Transportation (Non-Medical):  No   Physical Activity:     Days of Exercise per Week: Not on file    Minutes of Exercise per Session: Not on file   Stress:     Feeling of Stress : Not on file   Social Connections:     Frequency of Communication with Friends and Family: Not on file    Frequency of Social Gatherings with Friends and Family: Not on file    Attends Orthodox Services: Not on file    Active Member of [address] [place] [address] or Organizations: Not on file    Attends Club or Organization Meetings: Not on file    Marital Status: Not on file   Intimate Partner Violence:     Fear of Current or Ex-Partner: Not on file    Emotionally Abused: Not on file    Physically Abused: Not on file    Sexually Abused: Not on file   Housing Stability:     Unable to Pay for Housing in the Last Year: Not on file    Number of [place] in the Last Year: Not on file    Unstable Housing in the Last Year: Not on file       CURRENT MEDICATIONS:     Current Outpatient Medications   Medication Sig Dispense Refill    topiramate (TOPAMAX) 25 MG tablet Take 2 tablets by mouth daily 60 tablet 3    rizatriptan (MAXALT) 10 MG tablet Take 1 tablet by mouth once as needed for Migraine May repeat in 2 hours if needed 12 tablet 5    buPROPion (WELLBUTRIN XL) 300 MG extended release tablet Take 1 tablet by mouth every morning 90 tablet 1    verapamil (CALAN SR) 120 MG extended release tablet Take 1 tablet by mouth daily 90 tablet 2    butalbital-acetaminophen-caffeine (FIORICET, ESGIC) -40 MG per tablet Take one tab every other day as needed for severe headache 40 tablet 0    temazepam (RESTORIL) 15 MG capsule TAKE 1 CAPSULE BY MOUTH AT BEDTIME  2  LYRICA 200 MG capsule Take 2 capsules by mouth daily. 5    rOPINIRole (REQUIP) 1 MG tablet Take 1 tablet by mouth 4 times daily 90 tablet 3    Multiple Vitamin (MULTIVITAMIN) tablet Take 1 tablet by mouth daily 30 tablet 3     No current facility-administered medications for this visit. ALLERGIES:     Allergies   Allergen Reactions    Pcn [Penicillins] Other (See Comments)     \" as a child\"      Neomycin Rash    Penicillin G Rash                                 REVIEW OF SYSTEMS        All items selected indicate a positive finding. Those items not selected are negative.   Constitutional [] Weight loss/gain   [] Fatigue  [] Fever/Chills   HEENT [] Hearing Loss  [] Visual Disturbance  [] Tinnitus  [] Eye pain   Respiratory [] Shortness of Breath  [] Cough  [] Snoring   Cardiovascular [] Chest Pain  [] Palpitations  [] Lightheaded   GI [] Constipation  [] Diarrhea  [] Swallowing change  [x] Nausea/vomiting    [] Urinary Frequency  [] Urinary Urgency   Musculoskeletal [] Neck pain  [] Back pain  [] Muscle pain  [x] Restless legs   Dermatologic [] Skin changes   Neurologic [] Memory loss/confusion  [] Seizures  [] Trouble walking or imbalance  [] Dizziness  [] Sleep disturbance  [] Weakness  [] Numbness  [] Tremors  [] Speech Difficulty  [x] Headaches  [x] Light Sensitivity  [x] Sound Sensitivity   Endocrinology []Excessive thirst  []Excessive hunger   Psychiatric [] Anxiety/Depression  [] Hallucination   Allergy/immunology []Hives/environmental allergies   Hematologic/lymph [] Abnormal bleeding  [] Abnormal bruising         PHYSICAL EXAMINATION:       Vitals:    05/24/22 1041   BP: 113/77   Pulse: 72                                              .                                                                                                    General Appearance:  Alert, cooperative, no signs of distress, appears stated age   Head:  Normocephalic, no signs of trauma   Eyes:  Conjunctiva/corneas clear;  eyelids intact   Ears:  Normal external ear and canals   Nose: Nares normal, mucosa normal, no drainage    Throat: Lips and tongue normal; teeth normal;  gums normal   Neck: Supple, intact flexion, extension and rotation;   trachea midline;  no adenopathy;   thyroid: not enlarged;   no carotid pulse abnormality   Back:   Symmetric, no curvature, ROM adequate   Lungs:   Respirations unlabored   Heart:  Regular rate and rhythm           Extremities: Extremities normal, no cyanosis, no edema   Pulses: Symmetric over head and neck   Skin: Skin color, texture normal, no rashes, no lesions                                     NEUROLOGIC EXAMINATION    Neurologic Exam  Mental status    Alert and oriented x 3; intact memory with no confusion, speech or language problems; no hallucinations or delusions  Fund of information appropriate for level of education    Cranial nerves    II - visual fields intact to confrontation bilaterally  III, IV, VI - extra-ocular muscles full: no pupillary defect; no LEONARD, no nystagmus, no ptosis   V - normal facial sensation                                                               VII - normal facial symmetry                                                             VIII - intact hearing                                                                             IX, X - symmetrical palate                                                                  XI - symmetrical shoulder shrug                                                       XII - tongue midline without atrophy or fasciculation      Motor function  Normal muscle bulk and tone; strength 5/5 on all 4 extremities, no pronator drift      Sensory function Intact to light touch, pinprick, vibration, proprioception on all 4 extremities      Cerebellar Intact fine motor movement. No involuntary movements or tremors.  No ataxia or dysmetria on finger to nose or heel to shin testing      Reflex function DTR 2+ on bilateral UE and LE, symmetric. Down going toes bilaterally      Gait                   normal base and arm swing                  Medical Decision Making: In summary, your patient, Hunter Miranda exhibits the following, with associated plan:    1. Chronic migraine headaches, currently having approximately 10 migraine headaches/month. 1. Continue verapamil 120 mg at bedtime daily  2. Start topamax 25 mg twice daily  3. Start maxalt for abortive therapy  4. Continue fioricet for rescue  5. Return for follow up visit in 3 months, if she is still having frequent migraine headaches we will discuss a CGRP antagonist or botox injections  2. Comorbid restless leg syndrome with sleep apnea  1. Continue to follow with pulmonology who prescribe requip and CPAP            Signed: Yamil Griffith CNP      *Please note that portions of this note were completed with a voice recognition program.  Although every effort was made to insure the accuracy of this automated transcription, some errors in transcription may have occurred, occasionally words and are mis-transcribed    Provider Attestation: The documentation recorded by the scribe accurately reflects the service I personally performed and the decisions made by myself. Portions of this note were transcribed by a scribe. I personally performed the history, physical exam, and medical decision-making and confirm the accuracy of the information in the transcribed note. Scribe Attestation:   By signing my name below, Samuel Pierson, attclau that this documentation has been prepared under the direction and in the presence of Yamil Griffith CNP.

## 2022-06-14 ENCOUNTER — OFFICE VISIT (OUTPATIENT)
Dept: BEHAVIORAL/MENTAL HEALTH CLINIC | Age: 67
End: 2022-06-14
Payer: MEDICARE

## 2022-06-14 DIAGNOSIS — F33.0 MAJOR DEPRESSIVE DISORDER, RECURRENT EPISODE, MILD WITH ANXIOUS DISTRESS (HCC): Primary | ICD-10-CM

## 2022-06-14 PROCEDURE — 1036F TOBACCO NON-USER: CPT | Performed by: PSYCHOLOGIST

## 2022-06-14 PROCEDURE — 90837 PSYTX W PT 60 MINUTES: CPT | Performed by: PSYCHOLOGIST

## 2022-06-14 PROCEDURE — 1123F ACP DISCUSS/DSCN MKR DOCD: CPT | Performed by: PSYCHOLOGIST

## 2022-06-14 NOTE — PROGRESS NOTES
Venus Winn,  Ph.D.   Licensed Clinical Psychologist ([phone])    Visit Date: 6/14/2022   Time of appointment:  3:09p - 4:04p   Time spent with Patient: 55 minutes. This is patient's fifth appointment. Reason for Consult:  Depression and Anxiety     Referring Provider/PCP:    No ref. provider found  DESMOND Montague - CNP      Pt provided informed consent for the behavioral health program. Discussed with patient model of service to include the limits of confidentiality (i.e. abuse reporting, suicide intervention, etc.) and short-term intervention focused approach. Pt indicated understanding. Antonino Grewal is a 74 y.o. female who presents for follow up of depression and anxiety. She reported that she has been doing somewhat better over the past few weeks. She stated that she has noticed that the fatigue and irritation with attending medical appointments has decreased significantly now that her hand is better and she no longer has to see the dermatologist for weekly appointments. She reported that she was also able to get things done around her house that she has been wanting to do, including cleaning off her kitchen table, organizing and sorting her container shelves, and sorting and organizing her pantry. She reported that getting her house in order has helped her to feel better. While she is happy that she accomplished these tasks, Carlos Sharp admitted that she still feels like she lives a \"very empty life\" as she does not really have any hobbies or interests which leaves her feeling depressed at times. She expressed some mild interest in trying to find some activities to engage in. Previous Recommendations:   1)  Pt will practice more balanced, adaptive coping talk/thinking as well as relaxation and stress management exercises, including diaphragmatic breathing and meditation.     2)  Pt will work on breaking up cleaning and organization tasks into smaller, realistic goals she can accomplish. 3)  Pt will return for a follow-up appointment with the [facility] in 3.5 weeks on 6/14/22 at [address] [place] [address] was dysthymic with calm affect. Suicidal ideation was denied. Homicidal ideation was denied. Hygiene was good . Dress was appropriate. Behavior was Within Normal Limits with No observation or self-report of difficulties ambulating. Attitude was Cooperative. Eye-contact was good. Speech: rate - WNL, rhythm - WNL, volume - WNL. Verbalizations were goal directed and coherent. Thought processes were intact and goal-oriented without evidence of delusions, hallucinations, obsessions, or yoandy; with no cognitive distortions. Associations were characterized by intact cognitive processes. Pt was orientated oriented to person, place, time, and general circumstances;  recent:  good. Insight and judgment were estimated to be fair, AEB, a fair  understanding of cyclical maladaptive patterns, and the ability to use insight to inform behavior change. ASSESSMENT  Mar Pierce presented to the appointment today for evaluation and treatment of symptoms of depression and anxiety. She is currently deemed low risk to herself or others and meets criteria for MDD, Recurrent, Mild with anxious distress. She has been doing somewhat better over the past few weeks. She responded relatively well to interventions. She was praised for engaging in activities at home that helped to boost her mood. She was encouraged to check out volunteer opportunities through the [employer]. Jenaro Cheney was in agreement with recommendations.     PHQ Scores 7/21/2022 6/24/2022 4/12/2022 4/12/2022 3/15/2022 1/14/2022 12/10/2021   PHQ2 Score 0 0 - 0 0 2 2   PHQ9 Score 0 0 0 0 3 2 2     Interpretation of Total Score Depression Severity: 1-4 = Minimal depression, 5-9 = Mild depression, 10-14 = Moderate depression, 15-19 = Moderately severe depression, 20-27 = Severe depression    How often pt has had thoughts of death or hurting self (if PHQ positive for depression):       No flowsheet data found. Interpretation of BELEN-7 score: 5-9 = mild anxiety, 10-14 = moderate anxiety, 15+ = severe anxiety. Recommend referral to behavioral health for scores 10 or greater. DIAGNOSIS  Raguel Bumpers was seen today for depression and anxiety. Diagnoses and all orders for this visit:    Major depressive disorder, recurrent episode, mild with anxious distress ([facility] [address]        INTERVENTION  Trained in strategies for increasing balanced thinking, Discussed and set plan for behavioral activation, Discussed self-care (sleep, nutrition, rewarding activities, social support, exercise), Supportive techniques, Emphasized self-care as important for managing overall health, and CBT to target depression and anxiety      PLAN  1)  Pt will practice more balanced, adaptive coping talk/thinking as well as relaxation and stress management exercises, including diaphragmatic breathing and meditation. 2)  Pt will work on engaging in meaningful and/or enjoyable activities that help to boost her mood (e.g. cleaning and organizing her house). 3)  Pt will return for a follow-up appointment with the [facility] in 3 weeks on 7/5/22 at [facility]  Is interactive complexity present?   No  Reason:  N/A  Additional Supporting Information:  N/A       Electronically signed by Francheska Prabhakar, PhD on 6/14/22 at 3:07 PM EDT

## 2022-06-21 ENCOUNTER — TELEPHONE (OUTPATIENT)
Dept: FAMILY MEDICINE CLINIC | Age: 67
End: 2022-06-21

## 2022-06-21 NOTE — TELEPHONE ENCOUNTER
----- Message from [facility] sent at 6/21/2022 10:42 AM EDT -----  Subject: Message to Provider    QUESTIONS  Information for Provider? patient called in to cancel appt for july 5 at   11 am with marianna pinzon  ---------------------------------------------------------------------------  --------------  Ramana Rumleena INFO  What is the best way for the office to contact you? OK to leave message on   voicemail  Preferred Call Back Phone Number? [phone]  ---------------------------------------------------------------------------  --------------  SCRIPT ANSWERS  Relationship to Patient?  Self

## 2022-06-24 ENCOUNTER — HOSPITAL ENCOUNTER (OUTPATIENT)
Age: 67
Setting detail: SPECIMEN
Discharge: HOME | End: 2022-06-24

## 2022-06-24 ENCOUNTER — OFFICE VISIT (OUTPATIENT)
Dept: FAMILY MEDICINE CLINIC | Age: 67
End: 2022-06-24
Payer: MEDICARE

## 2022-06-24 VITALS
DIASTOLIC BLOOD PRESSURE: 78 MMHG | TEMPERATURE: 97.3 F | OXYGEN SATURATION: 97 % | BODY MASS INDEX: 32.75 KG/M2 | HEART RATE: 80 BPM | SYSTOLIC BLOOD PRESSURE: 122 MMHG | WEIGHT: 196.8 LBS

## 2022-06-24 DIAGNOSIS — R30.0 DYSURIA: Primary | ICD-10-CM

## 2022-06-24 DIAGNOSIS — R30.0 DYSURIA: ICD-10-CM

## 2022-06-24 LAB
-: ABNORMAL
BILIRUBIN URINE: NEGATIVE
CASTS UA: ABNORMAL /LPF (ref 0–8)
COLOR: YELLOW
EPITHELIAL CELLS UA: ABNORMAL /HPF (ref 0–5)
GLUCOSE URINE: NEGATIVE
KETONES, URINE: NEGATIVE
LEUKOCYTE ESTERASE, URINE: ABNORMAL
NITRITE, URINE: NEGATIVE
PH UA: >9 (ref 5–8)
PROTEIN UA: NEGATIVE
RBC UA: ABNORMAL /HPF (ref 0–4)
SPECIFIC GRAVITY UA: 1.02 (ref 1–1.03)
TURBIDITY: CLEAR
URINE HGB: NEGATIVE
UROBILINOGEN, URINE: NORMAL
WBC UA: ABNORMAL /HPF (ref 0–5)

## 2022-06-24 PROCEDURE — 1090F PRES/ABSN URINE INCON ASSESS: CPT | Performed by: FAMILY MEDICINE

## 2022-06-24 PROCEDURE — G8400 PT W/DXA NO RESULTS DOC: HCPCS | Performed by: FAMILY MEDICINE

## 2022-06-24 PROCEDURE — G8427 DOCREV CUR MEDS BY ELIG CLIN: HCPCS | Performed by: FAMILY MEDICINE

## 2022-06-24 PROCEDURE — G8417 CALC BMI ABV UP PARAM F/U: HCPCS | Performed by: FAMILY MEDICINE

## 2022-06-24 PROCEDURE — 1123F ACP DISCUSS/DSCN MKR DOCD: CPT | Performed by: FAMILY MEDICINE

## 2022-06-24 PROCEDURE — 1036F TOBACCO NON-USER: CPT | Performed by: FAMILY MEDICINE

## 2022-06-24 PROCEDURE — 99213 OFFICE O/P EST LOW 20 MIN: CPT | Performed by: FAMILY MEDICINE

## 2022-06-24 PROCEDURE — 3017F COLORECTAL CA SCREEN DOC REV: CPT | Performed by: FAMILY MEDICINE

## 2022-06-24 RX ORDER — NITROFURANTOIN 25; 75 MG/1; MG/1
100 CAPSULE ORAL 2 TIMES DAILY
Qty: 20 CAPSULE | Refills: 0 | Status: SHIPPED | OUTPATIENT
Start: 2022-06-24 | End: 2022-07-04

## 2022-06-24 ASSESSMENT — PATIENT HEALTH QUESTIONNAIRE - PHQ9
1. LITTLE INTEREST OR PLEASURE IN DOING THINGS: 0
9. THOUGHTS THAT YOU WOULD BE BETTER OFF DEAD, OR OF HURTING YOURSELF: 0
4. FEELING TIRED OR HAVING LITTLE ENERGY: 0
6. FEELING BAD ABOUT YOURSELF - OR THAT YOU ARE A FAILURE OR HAVE LET YOURSELF OR YOUR FAMILY DOWN: 0
2. FEELING DOWN, DEPRESSED OR HOPELESS: 0
10. IF YOU CHECKED OFF ANY PROBLEMS, HOW DIFFICULT HAVE THESE PROBLEMS MADE IT FOR YOU TO DO YOUR WORK, TAKE CARE OF THINGS AT HOME, OR GET ALONG WITH OTHER PEOPLE: 0
SUM OF ALL RESPONSES TO PHQ9 QUESTIONS 1 & 2: 0
3. TROUBLE FALLING OR STAYING ASLEEP: 0
5. POOR APPETITE OR OVEREATING: 0
SUM OF ALL RESPONSES TO PHQ QUESTIONS 1-9: 0
7. TROUBLE CONCENTRATING ON THINGS, SUCH AS READING THE NEWSPAPER OR WATCHING TELEVISION: 0
SUM OF ALL RESPONSES TO PHQ QUESTIONS 1-9: 0
8. MOVING OR SPEAKING SO SLOWLY THAT OTHER PEOPLE COULD HAVE NOTICED. OR THE OPPOSITE, BEING SO FIGETY OR RESTLESS THAT YOU HAVE BEEN MOVING AROUND A LOT MORE THAN USUAL: 0

## 2022-06-24 NOTE — PROGRESS NOTES
Progress Note    Ever Rudolph is a 74 y.o.  female who presents today alone for evaluation of   Chief Complaint   Patient presents with    Dysuria     took one of her sister's Bactrim           HPI:   Patient is here for same day visit to discuss dysuria. Patient reports symptoms for 24 hrs. Patient admits to burning with urination. Patient denies blood in urine. Patient denies foul smell to urine. Patient denies flank pain. Patient denies f/c/n/v/d. Patient did take one of her sisters bactrim tabs with some relief. Health Maintenance Due   Topic Date Due    Annual Wellness Visit (AWV)  Never done    HIV screen  Never done    Hepatitis C screen  Never done    Cervical cancer screen  Never done    DEXA (modify frequency per FRAX score)  Never done        Current Medications:     Current Outpatient Medications   Medication Sig Dispense Refill    nitrofurantoin, macrocrystal-monohydrate, (MACROBID) 100 MG capsule Take 1 capsule by mouth 2 times daily for 10 days 20 capsule 0    topiramate (TOPAMAX) 25 MG tablet Take 2 tablets by mouth daily 60 tablet 3    rizatriptan (MAXALT) 10 MG tablet Take 1 tablet by mouth once as needed for Migraine May repeat in 2 hours if needed 12 tablet 5    buPROPion (WELLBUTRIN XL) 300 MG extended release tablet Take 1 tablet by mouth every morning 90 tablet 1    verapamil (CALAN SR) 120 MG extended release tablet Take 1 tablet by mouth daily 90 tablet 2    butalbital-acetaminophen-caffeine (FIORICET, ESGIC) -40 MG per tablet Take one tab every other day as needed for severe headache 40 tablet 0    temazepam (RESTORIL) 15 MG capsule TAKE 1 CAPSULE BY MOUTH AT BEDTIME  2    LYRICA 200 MG capsule Take 2 capsules by mouth daily.    5    rOPINIRole (REQUIP) 1 MG tablet Take 1 tablet by mouth 4 times daily 90 tablet 3    Multiple Vitamin (MULTIVITAMIN) tablet Take 1 tablet by mouth daily 30 tablet 3     No current facility-administered medications for this visit. Allergies: Allergies   Allergen Reactions    Pcn [Penicillins] Other (See Comments)     \" as a child\"      Neomycin Rash    Penicillin G Rash        Medical History:     Past Medical History:   Diagnosis Date    [facility]     Anxiety     Atrial fibrillation ([facility] 2018    Degenerative disc disease, lumbar 2018    Depression     Frequent UTI     Infertility, female     Migraine without aura and without status migrainosus, not intractable 2022    MIRELLA (obstructive sleep apnea) 2022    Osteoarthritis     Restless leg syndrome     Squamous cell carcinoma in situ     Thyroid nodule, Rt. Lobe.  27 mm     Urge incontinence 2020       Past Surgical History:   Procedure Laterality Date    CATARACT REMOVAL  2007    right    COLONOSCOPY N/A 2021    COLONOSCOPY DIAGNOSTIC WITH BIOPSY performed by Arnold Charles MD at [address]      \" as a child\"   [address] SURGERY  2013    cateract removal    TONSILLECTOMY Bilateral 1963    TONSILLECTOMY AND ADENOIDECTOMY      UPPER GASTROINTESTINAL ENDOSCOPY N/A 2021    EGD ESOPHAGOGASTRODUODENOSCOPY performed by Arnold Charles MD at [facility] History   Problem Relation Age of Onset   Blase Liming Cancer Mother         pancreatic    Cancer Father     Arthritis Sister     Hypertension Mother     Osteoporosis Mother     Diabetes Paternal Grandmother     Lung Cancer Father         Social History:     Social History     Socioeconomic History    Marital status: Single     Spouse name: Not on file    Number of children: Not on file    Years of education: 15    Highest education level: Not on file   Occupational History    Occupation: [address]     Employer: [employer]   Tobacco Use    Smoking status: Former Smoker     Packs/day: 2.00     Years: 3.00     Pack years: 6.00     Types: Cigarettes     Quit date: 2021     Years since quittin.0    Smokeless tobacco: Never Used    Tobacco comment: quit 2 months ago, started again   Vaping Use    Vaping Use: Former   Substance and Sexual Activity    Alcohol use: No     Comment: rarely    Drug use: No    Sexual activity: Never     Partners: Male   Other Topics Concern    Not on file   Social History Narrative    ** Merged History Encounter **          Social Determinants of Health     Financial Resource Strain: Low Risk     Difficulty of Paying Living Expenses: Not hard at all   Food Insecurity: No Food Insecurity    Worried About [address] [place] [address] in the Last Year: Never true    Pedro of Food in the Last Year: Never true   Transportation Needs: No Transportation Needs    Lack of Transportation (Medical): No    Lack of Transportation (Non-Medical): No   Physical Activity:     Days of Exercise per Week: Not on file    Minutes of Exercise per Session: Not on file   Stress:     Feeling of Stress : Not on file   Social Connections:     Frequency of Communication with Friends and Family: Not on file    Frequency of Social Gatherings with Friends and Family: Not on file    Attends [employer] Services: Not on file    Active Member of [address] [place] [address] or Organizations: Not on file    Attends Club or Organization Meetings: Not on file    Marital Status: Not on file   Intimate Partner Violence:     Fear of Current or Ex-Partner: Not on file    Emotionally Abused: Not on file    Physically Abused: Not on file    Sexually Abused: Not on file   Housing Stability:     Unable to Pay for Housing in the Last Year: Not on file    Number of [place] in the Last Year: Not on file    Unstable Housing in the Last Year: Not on file        ROS:     Constitutional: No fevers, chills, fatigue. ENT: No nasal congestion or sore throat  Respiratory: No difficulty in breathing or cough.    Cardiovascular: No chest pain, palpitations or shortness of breath  Gastrointestinal: No abdominal pain or change in bowel movements. Genitourinary: No change in urinary frequency; + dysuria. Skin: No rashes or skin lesions. Neurological: No weakness. No headaches. Last Filed Vitals:  /78   Pulse 80   Temp 97.3 °F (36.3 °C) (Temporal)   Wt 196 lb 12.8 oz (89.3 kg)   SpO2 97%   BMI 32.75 kg/m²      Physical Examination:     GENERAL APPEARANCE: in no acute distress, well developed, well nourished. HEAD: normocephalic, atraumatic. EYES: extraocular movement intact (EOMI), pupils equal, round, reactive to light and accommodation. EARS: normal, tympanic membrane intact, clear, auditory canal clear. NOSE: nares patent, no erythema, sinuses nontender bilaterally, no rhinorrhea. ORAL CAVITY: mucosa moist, no lesions. THROAT: clear, no mass, no exudate. NECK/THYROID: neck supple, full range of motion, no thyromegaly. HEART: no murmurs, regular rate and rhythm, S1, S2 normal.   LUNGS: clear to auscultation bilaterally, no wheezes, rales, rhonchi. ABDOMEN: normal, bowel sounds present, soft, nontender, nondistended, no rebound guarding or rigidity    Recent Labs/ In Office Testing/ Radiograph review:     Hospital Outpatient Visit on 04/13/2022   Component Date Value Ref Range Status    Specimen Description 04/13/2022 . Random Urine   Final    Culture 04/13/2022 ESCHERICHIA COLI 50 to 100,000 CFU/ML*  Final       No results found for this visit on 06/24/22. Assessment/Plan: Simon Avila was seen today for dysuria. Diagnoses and all orders for this visit:    Dysuria  -     Cancel: POCT Urinalysis No Micro (Auto)  -     Cancel: Urinalysis with Microscopic; Future  -     Cancel: Culture, Urine; Future  -     nitrofurantoin, macrocrystal-monohydrate, (MACROBID) 100 MG capsule; Take 1 capsule by mouth 2 times daily for 10 days  -     Urinalysis with Microscopic; Future  -     Culture, Urine; Future    Abx as above. Follow up on labs. RTC precautions provided.     All questions answered and addressed to patient satisfaction. Patient understands and agrees to the plan. The patient was evaluated and treated today based on the osteopathic principle that each person is a unit of body, mind, and spirit, the body is capable of self-regulation, self-healing, and health maintenance and that structure and function are reciprocally interrelated. Follow-up:   Return if symptoms worsen or fail to improve.       Kadie Garza D.O.

## 2022-06-25 LAB
CULTURE: NORMAL
SPECIMEN DESCRIPTION: NORMAL

## 2022-06-30 ENCOUNTER — HOSPITAL ENCOUNTER (OUTPATIENT)
Dept: MAMMOGRAPHY | Age: 67
Discharge: HOME | End: 2022-07-02
Payer: MEDICARE

## 2022-06-30 DIAGNOSIS — Z12.31 ENCOUNTER FOR SCREENING MAMMOGRAM FOR MALIGNANT NEOPLASM OF BREAST: ICD-10-CM

## 2022-06-30 DIAGNOSIS — Z78.0 POST-MENOPAUSAL: ICD-10-CM

## 2022-06-30 PROCEDURE — 77080 DXA BONE DENSITY AXIAL: CPT

## 2022-06-30 PROCEDURE — 77063 BREAST TOMOSYNTHESIS BI: CPT

## 2022-07-08 DIAGNOSIS — F39 MOOD DISORDER (HCC): ICD-10-CM

## 2022-07-08 RX ORDER — BUPROPION HYDROCHLORIDE 300 MG/1
300 TABLET ORAL EVERY MORNING
Qty: 90 TABLET | Refills: 1 | Status: SHIPPED | OUTPATIENT
Start: 2022-07-08

## 2022-07-08 NOTE — TELEPHONE ENCOUNTER
Fabiola Mai is calling to request a refill on the following medication(s):    Medication Request:  Requested Prescriptions     Pending Prescriptions Disp Refills    buPROPion (WELLBUTRIN XL) 300 MG extended release tablet [Pharmacy Med Name: BUPROPION HCL  MG TABLET] 90 tablet 1     Sig: take 1 tablet by mouth every morning       Last Visit Date (If Applicable):  2/28/2856    Next Visit Date:    9/16/2022

## 2022-07-21 ENCOUNTER — HOSPITAL ENCOUNTER (OUTPATIENT)
Age: 67
Setting detail: SPECIMEN
Discharge: HOME | End: 2022-07-21

## 2022-07-21 ENCOUNTER — OFFICE VISIT (OUTPATIENT)
Dept: FAMILY MEDICINE CLINIC | Age: 67
End: 2022-07-21
Payer: MEDICARE

## 2022-07-21 VITALS
OXYGEN SATURATION: 96 % | BODY MASS INDEX: 31.82 KG/M2 | DIASTOLIC BLOOD PRESSURE: 84 MMHG | WEIGHT: 191.2 LBS | SYSTOLIC BLOOD PRESSURE: 134 MMHG | HEART RATE: 88 BPM | TEMPERATURE: 97.1 F

## 2022-07-21 DIAGNOSIS — R30.0 DYSURIA: ICD-10-CM

## 2022-07-21 DIAGNOSIS — Z87.440 HISTORY OF RECURRENT UTIS: ICD-10-CM

## 2022-07-21 DIAGNOSIS — R30.0 DYSURIA: Primary | ICD-10-CM

## 2022-07-21 LAB
BILIRUBIN, POC: NORMAL
BLOOD URINE, POC: NORMAL
CLARITY, POC: CLEAR
COLOR, POC: YELLOW
GLUCOSE URINE, POC: NORMAL
KETONES, POC: NORMAL
LEUKOCYTE EST, POC: NORMAL
NITRITE, POC: NORMAL
PH, POC: 6.5
PROTEIN, POC: NORMAL
SPECIFIC GRAVITY, POC: 1.01
UROBILINOGEN, POC: 0.2

## 2022-07-21 PROCEDURE — 1090F PRES/ABSN URINE INCON ASSESS: CPT | Performed by: NURSE PRACTITIONER

## 2022-07-21 PROCEDURE — G8399 PT W/DXA RESULTS DOCUMENT: HCPCS | Performed by: NURSE PRACTITIONER

## 2022-07-21 PROCEDURE — 3017F COLORECTAL CA SCREEN DOC REV: CPT | Performed by: NURSE PRACTITIONER

## 2022-07-21 PROCEDURE — G8427 DOCREV CUR MEDS BY ELIG CLIN: HCPCS | Performed by: NURSE PRACTITIONER

## 2022-07-21 PROCEDURE — 1036F TOBACCO NON-USER: CPT | Performed by: NURSE PRACTITIONER

## 2022-07-21 PROCEDURE — G8417 CALC BMI ABV UP PARAM F/U: HCPCS | Performed by: NURSE PRACTITIONER

## 2022-07-21 PROCEDURE — 1123F ACP DISCUSS/DSCN MKR DOCD: CPT | Performed by: NURSE PRACTITIONER

## 2022-07-21 PROCEDURE — 99214 OFFICE O/P EST MOD 30 MIN: CPT | Performed by: NURSE PRACTITIONER

## 2022-07-21 PROCEDURE — 81003 URINALYSIS AUTO W/O SCOPE: CPT | Performed by: NURSE PRACTITIONER

## 2022-07-21 RX ORDER — PHENAZOPYRIDINE HYDROCHLORIDE 100 MG/1
100 TABLET, FILM COATED ORAL 3 TIMES DAILY PRN
Qty: 9 TABLET | Refills: 0 | Status: SHIPPED | OUTPATIENT
Start: 2022-07-21 | End: 2022-09-22

## 2022-07-21 RX ORDER — SULFAMETHOXAZOLE AND TRIMETHOPRIM 800; 160 MG/1; MG/1
1 TABLET ORAL 2 TIMES DAILY
Qty: 6 TABLET | Refills: 0 | Status: SHIPPED | OUTPATIENT
Start: 2022-07-21 | End: 2022-07-24

## 2022-07-21 ASSESSMENT — ENCOUNTER SYMPTOMS
RESPIRATORY NEGATIVE: 1
NAUSEA: 0
DIARRHEA: 0
COLOR CHANGE: 0
BACK PAIN: 0
EYES NEGATIVE: 1
CONSTIPATION: 0
ABDOMINAL PAIN: 0
ALLERGIC/IMMUNOLOGIC NEGATIVE: 1
GASTROINTESTINAL NEGATIVE: 1
VOMITING: 0

## 2022-07-21 ASSESSMENT — PATIENT HEALTH QUESTIONNAIRE - PHQ9
1. LITTLE INTEREST OR PLEASURE IN DOING THINGS: 0
SUM OF ALL RESPONSES TO PHQ QUESTIONS 1-9: 0
3. TROUBLE FALLING OR STAYING ASLEEP: 0
2. FEELING DOWN, DEPRESSED OR HOPELESS: 0
SUM OF ALL RESPONSES TO PHQ QUESTIONS 1-9: 0
4. FEELING TIRED OR HAVING LITTLE ENERGY: 0
10. IF YOU CHECKED OFF ANY PROBLEMS, HOW DIFFICULT HAVE THESE PROBLEMS MADE IT FOR YOU TO DO YOUR WORK, TAKE CARE OF THINGS AT HOME, OR GET ALONG WITH OTHER PEOPLE: 0
SUM OF ALL RESPONSES TO PHQ QUESTIONS 1-9: 0
SUM OF ALL RESPONSES TO PHQ9 QUESTIONS 1 & 2: 0
9. THOUGHTS THAT YOU WOULD BE BETTER OFF DEAD, OR OF HURTING YOURSELF: 0
5. POOR APPETITE OR OVEREATING: 0
8. MOVING OR SPEAKING SO SLOWLY THAT OTHER PEOPLE COULD HAVE NOTICED. OR THE OPPOSITE, BEING SO FIGETY OR RESTLESS THAT YOU HAVE BEEN MOVING AROUND A LOT MORE THAN USUAL: 0
6. FEELING BAD ABOUT YOURSELF - OR THAT YOU ARE A FAILURE OR HAVE LET YOURSELF OR YOUR FAMILY DOWN: 0
7. TROUBLE CONCENTRATING ON THINGS, SUCH AS READING THE NEWSPAPER OR WATCHING TELEVISION: 0
SUM OF ALL RESPONSES TO PHQ QUESTIONS 1-9: 0

## 2022-07-21 NOTE — PROGRESS NOTES
[facility]  [address] [facility]  Dept: [address] is a 74 y.o. female who presents today for her medical conditions/complaintsas noted below. Nicole Germain is here today c/o Dysuria (With urgency. Started Monday)    Past Medical History:   Diagnosis Date    A-fib [facility])     Anxiety     Atrial fibrillation (HCC) 2018    Degenerative disc disease, lumbar 2018    Depression     Frequent UTI     Infertility, female     Migraine without aura and without status migrainosus, not intractable 2022    MIRELLA (obstructive sleep apnea) 2022    Osteoarthritis     Restless leg syndrome     Squamous cell carcinoma in situ     Thyroid nodule, Rt. Lobe.  27 mm     Urge incontinence 2020      Past Surgical History:   Procedure Laterality Date    BREAST BIOPSY      CATARACT REMOVAL  2007    right    COLONOSCOPY N/A 2021    COLONOSCOPY DIAGNOSTIC WITH BIOPSY performed by Mat Schneider MD at [facility]      \" as a child\"    EYE SURGERY  2013    cateract removal    TONSILLECTOMY Bilateral 1963    TONSILLECTOMY AND ADENOIDECTOMY      UPPER GASTROINTESTINAL ENDOSCOPY N/A 2021    EGD ESOPHAGOGASTRODUODENOSCOPY performed by Mat Schneider MD at [address] [place] [address] History   Problem Relation Age of Onset    Cancer Mother         pancreatic    Hypertension Mother     Osteoporosis Mother     Cancer Father     Lung Cancer Father     Arthritis Sister     Diabetes Paternal Grandmother     Breast Cancer Paternal Grandmother        Social History     Tobacco Use    Smoking status: Former     Packs/day: 2.00     Years: 3.00     Pack years: 6.00     Types: Cigarettes     Quit date: 2021     Years since quittin.1    Smokeless tobacco: Never    Tobacco comments:     quit 2 months ago, started again   Substance Use Topics    Alcohol use: No     Comment: rarely      Current Outpatient Medications   Medication Sig Dispense Refill    buPROPion (WELLBUTRIN XL) 300 MG extended release tablet take 1 tablet by mouth every morning 90 tablet 1    topiramate (TOPAMAX) 25 MG tablet Take 2 tablets by mouth daily 60 tablet 3    rizatriptan (MAXALT) 10 MG tablet Take 1 tablet by mouth once as needed for Migraine May repeat in 2 hours if needed 12 tablet 5    verapamil (CALAN SR) 120 MG extended release tablet Take 1 tablet by mouth daily 90 tablet 2    butalbital-acetaminophen-caffeine (FIORICET, ESGIC) -40 MG per tablet Take one tab every other day as needed for severe headache 40 tablet 0    temazepam (RESTORIL) 15 MG capsule TAKE 1 CAPSULE BY MOUTH AT BEDTIME  2    LYRICA 200 MG capsule Take 2 capsules by mouth daily. 5    rOPINIRole (REQUIP) 1 MG tablet Take 1 tablet by mouth 4 times daily 90 tablet 3    Multiple Vitamin (MULTIVITAMIN) tablet Take 1 tablet by mouth daily 30 tablet 3     No current facility-administered medications for this visit. Allergies   Allergen Reactions    Pcn [Penicillins] Other (See Comments)     \" as a child\"      Neomycin Rash    Penicillin G Rash         HPI:     Urinary Frequency   This is a recurrent problem. The current episode started yesterday (Monday/Tuesday). The problem has been rapidly worsening. The quality of the pain is described as burning. The patient is experiencing no pain. There has been no fever. She is Not sexually active. There is No history of pyelonephritis. Associated symptoms include frequency and urgency. Pertinent negatives include no chills, discharge, flank pain, hematuria, hesitancy, nausea, possible pregnancy, sweats or vomiting. Treatments tried: leftover Bactrim , Cranberry juice. The treatment provided no relief. Her past medical history is significant for recurrent UTIs. There is no history of catheterization, kidney stones, a single kidney, urinary stasis or a urological procedure.      Pt reports poor hygiene as trigger   She will wipe herself and then leave tissue in underwear   Reports her pain was so bad last night she was almost suicidal  Declines current SI thoughts / plan   Not sexually active   No discharge or vaginal lesions     Health Maintenance:      Subjective:     Review of Systems   Constitutional:  Positive for fatigue. Negative for appetite change, chills, diaphoresis and fever. HENT: Negative. Eyes: Negative. Respiratory: Negative. Gastrointestinal: Negative. Negative for abdominal pain, constipation, diarrhea, nausea and vomiting. Endocrine: Negative. Genitourinary:  Positive for dysuria, frequency and urgency. Negative for difficulty urinating, flank pain, genital sores, hematuria, hesitancy, vaginal bleeding and vaginal discharge. Musculoskeletal: Negative. Negative for back pain. Skin: Negative. Negative for color change, pallor, rash and wound. Allergic/Immunologic: Negative. Neurological: Negative. Negative for seizures, syncope and facial asymmetry. Hematological: Negative. Psychiatric/Behavioral: Negative. Objective:     Vitals:    07/21/22 1409   BP: 134/84   Pulse: 88   Temp: 97.1 °F (36.2 °C)   SpO2: 96%       Body mass index is 31.82 kg/m². Physical Exam  Constitutional:       General: She is not in acute distress. Appearance: Normal appearance. She is well-developed. She is obese. She is not ill-appearing, toxic-appearing or diaphoretic. HENT:      Head: Normocephalic and atraumatic. Right Ear: External ear normal.      Left Ear: External ear normal.      Nose: Nose normal.   Eyes:      General: No scleral icterus. Right eye: No discharge. Left eye: No discharge. Conjunctiva/sclera: Conjunctivae normal.      Pupils: Pupils are equal, round, and reactive to light. Neck:      Trachea: No tracheal deviation. Cardiovascular:      Rate and Rhythm: Normal rate and regular rhythm. Heart sounds: Normal heart sounds. No murmur heard. No friction rub. No gallop. Pulmonary:      Effort: Pulmonary effort is normal. No tachypnea, accessory muscle usage or respiratory distress. Breath sounds: Normal breath sounds. No stridor. No decreased breath sounds, wheezing, rhonchi or rales. Abdominal:      General: Abdomen is flat. There is no distension. Palpations: Abdomen is soft. Tenderness: There is no abdominal tenderness. There is no right CVA tenderness, left CVA tenderness or guarding. Musculoskeletal:         General: No tenderness or deformity. Normal range of motion. Cervical back: Normal range of motion and neck supple. Skin:     General: Skin is warm and dry. Coloration: Skin is not pale. Findings: No erythema or rash. Neurological:      Mental Status: She is alert and oriented to person, place, and time. GCS: GCS eye subscore is 4. GCS verbal subscore is 5. GCS motor subscore is 6. Gait: Gait is intact. Gait normal.   Psychiatric:         Mood and Affect: Mood is anxious. Speech: Speech normal.         Behavior: Behavior is agitated. Thought Content: Thought content normal.         Cognition and Memory: Cognition normal.         Judgment: Judgment normal.         Assessment:         1. Dysuria    2. History of recurrent UTIs        Plan:     1. Dysuria    - POCT Urinalysis No Micro (Auto)  - Culture, Urine; Future  - AFL - Kendall Mejia MD, Urology, [facility]  - phenazopyridine (PYRIDIUM) 100 MG tablet; Take 1 tablet by mouth 3 times daily as needed for Pain  Dispense: 9 tablet; Refill: 0  - sulfamethoxazole-trimethoprim (BACTRIM DS) 800-160 MG per tablet; Take 1 tablet by mouth in the morning and 1 tablet before bedtime. Do all this for 3 days. Dispense: 6 tablet;  Refill: 0    POCT dip negative  I advised patient to wait until culture before tx initiated   Patient then reported she was so miserable last night she was ' almost suicidal'   When asked if currently suicidal patient declined   She begged for antibiotics  I recommended running STI panel - pt declined she is not sexually active   TX with Bactrim for 3 days until culture available  Trial of pyridium - side effects discussed  Recommended Urology f/u for possible cysto irregardless   May need pelvic exam if sx persist     2. History of recurrent UTIs    - AFL - Kermit Vaughn MD, Urology, [facility]@    Total time: 35 minutes. This includes time spent with the patient during the visit as well as time spent before and after the visit reviewing patient's chart, documenting in the encounter, reviewing lab work and applicable diagnostic testing, & determining treatment & plan of care. Discussed use, benefit, and side effects of prescribed medications. All patient questions answered. Pt voiced understanding. Reviewed health maintenance. Instructed to continue current medications, diet and exercise. Patient agreedwith treatment plan. Follow up as directed.      Electronically signed by DESMOND Beltrán CNP on 7/21/2022

## 2022-07-21 NOTE — PATIENT INSTRUCTIONS
[address] [facility], [address]  [address] [facility] [address]   [place], [address]   [phone]

## 2022-07-22 LAB
CULTURE: NORMAL
SPECIMEN DESCRIPTION: NORMAL

## 2022-08-17 DIAGNOSIS — G43.019 INTRACTABLE MIGRAINE WITHOUT AURA AND WITHOUT STATUS MIGRAINOSUS: ICD-10-CM

## 2022-08-30 ENCOUNTER — OFFICE VISIT (OUTPATIENT)
Dept: NEUROLOGY | Age: 67
End: 2022-08-30
Payer: MEDICARE

## 2022-08-30 VITALS
HEIGHT: 65 IN | SYSTOLIC BLOOD PRESSURE: 119 MMHG | HEART RATE: 79 BPM | WEIGHT: 192 LBS | BODY MASS INDEX: 31.99 KG/M2 | DIASTOLIC BLOOD PRESSURE: 77 MMHG

## 2022-08-30 DIAGNOSIS — G43.009 MIGRAINE WITHOUT AURA AND WITHOUT STATUS MIGRAINOSUS, NOT INTRACTABLE: ICD-10-CM

## 2022-08-30 DIAGNOSIS — G47.33 OSA (OBSTRUCTIVE SLEEP APNEA): Primary | ICD-10-CM

## 2022-08-30 DIAGNOSIS — G43.019 INTRACTABLE MIGRAINE WITHOUT AURA AND WITHOUT STATUS MIGRAINOSUS: ICD-10-CM

## 2022-08-30 DIAGNOSIS — G25.81 RLS (RESTLESS LEGS SYNDROME): ICD-10-CM

## 2022-08-30 PROCEDURE — G8427 DOCREV CUR MEDS BY ELIG CLIN: HCPCS | Performed by: NURSE PRACTITIONER

## 2022-08-30 PROCEDURE — 99214 OFFICE O/P EST MOD 30 MIN: CPT | Performed by: NURSE PRACTITIONER

## 2022-08-30 PROCEDURE — G8399 PT W/DXA RESULTS DOCUMENT: HCPCS | Performed by: NURSE PRACTITIONER

## 2022-08-30 PROCEDURE — 1123F ACP DISCUSS/DSCN MKR DOCD: CPT | Performed by: NURSE PRACTITIONER

## 2022-08-30 PROCEDURE — 3017F COLORECTAL CA SCREEN DOC REV: CPT | Performed by: NURSE PRACTITIONER

## 2022-08-30 PROCEDURE — G8417 CALC BMI ABV UP PARAM F/U: HCPCS | Performed by: NURSE PRACTITIONER

## 2022-08-30 PROCEDURE — 1090F PRES/ABSN URINE INCON ASSESS: CPT | Performed by: NURSE PRACTITIONER

## 2022-08-30 PROCEDURE — 1036F TOBACCO NON-USER: CPT | Performed by: NURSE PRACTITIONER

## 2022-08-30 RX ORDER — BUTALBITAL, ACETAMINOPHEN AND CAFFEINE 50; 325; 40 MG/1; MG/1; MG/1
TABLET ORAL
Qty: 40 TABLET | Refills: 3 | Status: SHIPPED | OUTPATIENT
Start: 2022-08-30

## 2022-08-30 NOTE — PROGRESS NOTES
[facility]            [address]          [place], [address] [facility]          Dept: [phone]          Dept Fax: [phone]    MD Juan José Magallon MD Roz Marlin, MD Chilton Fairly, HERNÁN            8/30/2022      HISTORY OF PRESENT ILLNESS:       I had the pleasure of seeing Brad Ibanez, who returns for continuing neurologic care. The patient was seen last on May 24, 2022 for treatment of chronic migraine headaches and restless leg syndrome with sleep apnea. For prophylaxis of her headaches she was prescribed verapamil 120 mg daily and topamax 25 mg twice daily. For abortive therapy she was prescribed maxalt and fioricet. She is here today reporting that she is not taking maxalt as it was ineffective in aborting her headaches. She also tried using the topamax for prevention of her headaches however it provided her with no improvement in her headache frequency. She has remained compliant to verapamil and is currently getting 5 headaches/month. Headache location: right orbital   Headache quality: throbbing  Associated factors:  Nausea, photophobia, phonophobia   Intensity: 7/10  Headache chronicity:   Headache frequency: 5/month  Aggravating factors : bright lights  Relieving factors: partially by sleep   Prophylactic medications: verapamil   Abortive medications: fioricet  Previously used medications: topamax, maxalt    She also has restless leg syndrome with sleep apnea and follows with pulmonology who prescribed requip and CPAP. She recently received her CPAP machine and notes significant improvements in her sleep. Testing reviewed:    MRI Brain (w/wo) (10/10/19): No acute infarct, intracranial hemorrhage or significant mass-effect. No evidence of abnormal intracranial enhancement. Mild amount of chronic small vessel ischemic white matter disease.   Mild diffuse T1 hypointensity involving clivus and upper cervical spine. PAST MEDICAL HISTORY:         Diagnosis Date    A-fib [facility])     Anxiety     Atrial fibrillation ([address] 2018    Degenerative disc disease, lumbar 2018    Depression     Frequent UTI     Infertility, female     Migraine without aura and without status migrainosus, not intractable 2022    MIRELLA (obstructive sleep apnea) 2022    Osteoarthritis     Restless leg syndrome     Squamous cell carcinoma in situ     Thyroid nodule, Rt. Lobe.  27 mm     Urge incontinence 2020        PAST SURGICAL HISTORY:         Procedure Laterality Date    BREAST BIOPSY      CATARACT REMOVAL  2007    right    COLONOSCOPY N/A 2021    COLONOSCOPY DIAGNOSTIC WITH BIOPSY performed by Chetan Miller MD at [facility]      \" as a child\"    EYE SURGERY  2013    cateract removal    TONSILLECTOMY Bilateral 1963    TONSILLECTOMY AND ADENOIDECTOMY      UPPER GASTROINTESTINAL ENDOSCOPY N/A 2021    EGD ESOPHAGOGASTRODUODENOSCOPY performed by Chetan Miller MD at [address] [place] [address]:     Social History     Socioeconomic History    Marital status: Single     Spouse name: Not on file    Number of children: Not on file    Years of education: 13    Highest education level: Not on file   Occupational History    Occupation: [id number] [employer]     Employer: [employer]   Tobacco Use    Smoking status: Former     Packs/day: 2.00     Years: 3.00     Pack years: 6.00     Types: Cigarettes     Quit date: 2021     Years since quittin.2    Smokeless tobacco: Never    Tobacco comments:     quit 2 months ago, started again   Vaping Use    Vaping Use: Former   Substance and Sexual Activity    Alcohol use: No     Comment: rarely    Drug use: No    Sexual activity: Never     Partners: Male   Other Topics Concern    Not on file   Social History Narrative    ** Merged History Encounter **          Social Determinants of Health     Financial Resource Strain: Not on file   Food Insecurity: Not on file   Transportation Needs: Not on file   Physical Activity: Not on file   Stress: Not on file   Social Connections: Not on file   Intimate Partner Violence: Not on file   Housing Stability: Not on file       CURRENT MEDICATIONS:     Current Outpatient Medications   Medication Sig Dispense Refill    butalbital-acetaminophen-caffeine (FIORICET, ESGIC) -40 MG per tablet Take one tab every other day as needed for severe headache 40 tablet 3    verapamil (CALAN SR) 120 MG extended release tablet Take 1 tablet by mouth daily 90 tablet 3    buPROPion (WELLBUTRIN XL) 300 MG extended release tablet take 1 tablet by mouth every morning 90 tablet 1    LYRICA 200 MG capsule Take 2 capsules by mouth daily. 5    rOPINIRole (REQUIP) 1 MG tablet Take 1 tablet by mouth 4 times daily 90 tablet 3    Multiple Vitamin (MULTIVITAMIN) tablet Take 1 tablet by mouth daily 30 tablet 3    phenazopyridine (PYRIDIUM) 100 MG tablet Take 1 tablet by mouth 3 times daily as needed for Pain (Patient not taking: Reported on 8/30/2022) 9 tablet 0    temazepam (RESTORIL) 15 MG capsule TAKE 1 CAPSULE BY MOUTH AT BEDTIME (Patient not taking: Reported on 8/30/2022)  2     No current facility-administered medications for this visit. ALLERGIES:     Allergies   Allergen Reactions    Pcn [Penicillins] Other (See Comments)     \" as a child\"      Neomycin Rash    Penicillin G Rash                                 REVIEW OF SYSTEMS        All items selected indicate a positive finding. Those items not selected are negative.   Constitutional [] Weight loss/gain   [] Fatigue  [] Fever/Chills   HEENT [] Hearing Loss  [] Visual Disturbance  [] Tinnitus  [] Eye pain   Respiratory [] Shortness of Breath  [] Cough  [] Snoring   Cardiovascular [] Chest Pain  [] Palpitations  [] Lightheaded   GI [] Constipation  [] Diarrhea  [] Swallowing change  [x] Nausea/vomiting    [] Urinary Frequency  [] Urinary Urgency   Musculoskeletal [] Neck pain  [] Back pain  [] Muscle pain  [x] Restless legs   Dermatologic [] Skin changes   Neurologic [] Memory loss/confusion  [] Seizures  [] Trouble walking or imbalance  [] Dizziness  [] Sleep disturbance  [] Weakness  [] Numbness  [] Tremors  [] Speech Difficulty  [x] Headaches  [x] Light Sensitivity  [x] Sound Sensitivity   Endocrinology []Excessive thirst  []Excessive hunger   Psychiatric [] Anxiety/Depression  [] Hallucination   Allergy/immunology []Hives/environmental allergies   Hematologic/lymph [] Abnormal bleeding  [] Abnormal bruising         PHYSICAL EXAMINATION:       Vitals:    08/30/22 1052   BP: 119/77   Pulse: 79                                              .                                                                                                    General Appearance:  Alert, cooperative, no signs of distress, appears stated age   Head:  Normocephalic, no signs of trauma   Eyes:  Conjunctiva/corneas clear;  eyelids intact   Ears:  Normal external ear and canals   Nose: Nares normal, mucosa normal, no drainage    Throat: Lips and tongue normal; teeth normal;  gums normal   Neck: Supple, intact flexion, extension and rotation;   trachea midline;  no adenopathy;   thyroid: not enlarged;   no carotid pulse abnormality   Back:   Symmetric, no curvature, ROM adequate   Lungs:   Respirations unlabored   Heart:  Regular rate and rhythm           Extremities: Extremities normal, no cyanosis, no edema   Pulses: Symmetric over head and neck   Skin: Skin color, texture normal, no rashes, no lesions                                     NEUROLOGIC EXAMINATION    Neurologic Exam  Mental status    Alert and oriented x 3; intact memory with no confusion, speech or language problems; no hallucinations or delusions  Fund of information appropriate for level of education    Cranial nerves    II - visual fields intact to confrontation bilaterally  III, IV, VI - extra-ocular muscles full: no pupillary defect; no LEONARD, no nystagmus, no ptosis   V - normal facial sensation                                                               VII - normal facial symmetry                                                             VIII - intact hearing                                                                             IX, X - symmetrical palate                                                                  XI - symmetrical shoulder shrug                                                       XII - tongue midline without atrophy or fasciculation      Motor function  Normal muscle bulk and tone; strength 5/5 on all 4 extremities, no pronator drift      Sensory function Intact to light touch, pinprick, vibration, proprioception on all 4 extremities      Cerebellar Intact fine motor movement. No involuntary movements or tremors. No ataxia or dysmetria on finger to nose or heel to shin testing      Reflex function DTR 2+ on bilateral UE and LE, symmetric. Down going toes bilaterally      Gait                   normal base and arm swing                  Medical Decision Making: In summary, your patient, Jian Randall exhibits the following, with associated plan:    Chronic migraine headaches, which have recently improved to 5 migraine headaches/month. She recently received her CPAP machine which has been effective in improving her sleep and also may contribute to improvements in her headache frequency.    Continue verapamil 120 mg at bedtime daily  Continue fioricet for rescue  Return for follow up visit in 6 months  Comorbid restless leg syndrome with sleep apnea  Continue to follow with pulmonology who prescribe requip and CPAP            Signed: Bonny Dye CNP      *Please note that portions of this note were completed with a voice recognition program.  Although every effort was made to insure the accuracy of this automated transcription, some errors in transcription may have occurred, occasionally words and are mis-transcribed    Provider Attestation: The documentation recorded by the scribe accurately reflects the service I personally performed and the decisions made by myself. Portions of this note were transcribed by a scribe. I personally performed the history, physical exam, and medical decision-making and confirm the accuracy of the information in the transcribed note. Scribe Attestation:   By signing my name below, Darwin Sheets, attest that this documentation has been prepared under the direction and in the presence of Cecilia Vogt CNP.

## 2022-09-15 LAB
ALBUMIN SERPL-MCNC: 4.1 G/DL
ALP BLD-CCNC: 67 U/L
ALT SERPL-CCNC: 16 U/L
ANION GAP SERPL CALCULATED.3IONS-SCNC: NORMAL MMOL/L
AST SERPL-CCNC: 19 U/L
AVERAGE GLUCOSE: NORMAL
BASOPHILS ABSOLUTE: 40 /ΜL
BASOPHILS RELATIVE PERCENT: 0.6 %
BILIRUB SERPL-MCNC: 0.3 MG/DL (ref 0.1–1.4)
BUN BLDV-MCNC: NORMAL MG/DL
CALCIUM SERPL-MCNC: 9.2 MG/DL
CHLORIDE BLD-SCNC: 105 MMOL/L
CHOLESTEROL, TOTAL: 158 MG/DL
CHOLESTEROL/HDL RATIO: 2.7
CO2: 28 MMOL/L
CREAT SERPL-MCNC: 0.76 MG/DL
EOSINOPHILS ABSOLUTE: 59 /ΜL
EOSINOPHILS RELATIVE PERCENT: 0.9 %
GFR CALCULATED: 87
GLUCOSE BLD-MCNC: 72 MG/DL
HBA1C MFR BLD: 5.1 %
HCT VFR BLD CALC: 43.2 % (ref 36–46)
HDLC SERPL-MCNC: 58 MG/DL (ref 35–70)
HEMOGLOBIN: 14.5 G/DL (ref 12–16)
LDL CHOLESTEROL CALCULATED: 83 MG/DL (ref 0–160)
LYMPHOCYTES ABSOLUTE: 1987 /ΜL
LYMPHOCYTES RELATIVE PERCENT: 30.1 %
MCH RBC QN AUTO: 32 PG
MCHC RBC AUTO-ENTMCNC: 33.6 G/DL
MCV RBC AUTO: 95.4 FL
MONOCYTES ABSOLUTE: 429 /ΜL
MONOCYTES RELATIVE PERCENT: 6.5 %
NEUTROPHILS ABSOLUTE: 4085 /ΜL
NEUTROPHILS RELATIVE PERCENT: 61.9 %
NONHDLC SERPL-MCNC: 100 MG/DL
PDW BLD-RTO: 13.5 %
PLATELET # BLD: 224 K/ΜL
PMV BLD AUTO: 10.8 FL
POTASSIUM SERPL-SCNC: 4.8 MMOL/L
RBC # BLD: 4.53 10^6/ΜL
SODIUM BLD-SCNC: 139 MMOL/L
TOTAL PROTEIN: 6.4
TRIGL SERPL-MCNC: 76 MG/DL
TSH SERPL DL<=0.05 MIU/L-ACNC: 1.12 UIU/ML
VLDLC SERPL CALC-MCNC: NORMAL MG/DL
WBC # BLD: 6.6 10^3/ML

## 2022-09-19 RX ORDER — TOPIRAMATE 25 MG/1
TABLET ORAL
Qty: 60 TABLET | Refills: 3 | OUTPATIENT
Start: 2022-09-19

## 2022-09-19 NOTE — TELEPHONE ENCOUNTER
Pharmacy requesting refill of topiramate (TOPAMAX) 25 MG tablet      Medication active on med list yes      Date of last Rx: 05/24/2022 with 3 refills          verified by PEDRO MORALES      Date of last appointment 08/30/2022    Next Visit Date:  Visit date not found      Called and left message to verify if patient is currently taking this medication. Medication D/C by provider on 8/30/22. Patient called in and confirmed that she is no longer taking this medication.

## 2022-09-20 DIAGNOSIS — E04.1 THYROID NODULE: ICD-10-CM

## 2022-09-20 DIAGNOSIS — R73.01 IFG (IMPAIRED FASTING GLUCOSE): ICD-10-CM

## 2022-09-20 DIAGNOSIS — I48.0 PAROXYSMAL ATRIAL FIBRILLATION (HCC): ICD-10-CM

## 2022-09-22 ENCOUNTER — OFFICE VISIT (OUTPATIENT)
Dept: FAMILY MEDICINE CLINIC | Age: 67
End: 2022-09-22
Payer: MEDICARE

## 2022-09-22 VITALS
WEIGHT: 196.8 LBS | SYSTOLIC BLOOD PRESSURE: 118 MMHG | TEMPERATURE: 97.6 F | DIASTOLIC BLOOD PRESSURE: 78 MMHG | HEART RATE: 90 BPM | OXYGEN SATURATION: 98 % | BODY MASS INDEX: 32.75 KG/M2

## 2022-09-22 DIAGNOSIS — Z86.79 S/P ABLATION OF ATRIAL FIBRILLATION: ICD-10-CM

## 2022-09-22 DIAGNOSIS — Z98.890 S/P ABLATION OF ATRIAL FIBRILLATION: ICD-10-CM

## 2022-09-22 DIAGNOSIS — Z23 IMMUNIZATION DUE: ICD-10-CM

## 2022-09-22 DIAGNOSIS — I48.0 PAROXYSMAL ATRIAL FIBRILLATION (HCC): ICD-10-CM

## 2022-09-22 DIAGNOSIS — N95.2 ATROPHIC VAGINITIS: Primary | ICD-10-CM

## 2022-09-22 DIAGNOSIS — F41.8 MIXED ANXIETY DEPRESSIVE DISORDER: ICD-10-CM

## 2022-09-22 DIAGNOSIS — G25.81 RLS (RESTLESS LEGS SYNDROME): ICD-10-CM

## 2022-09-22 PROBLEM — R73.01 IFG (IMPAIRED FASTING GLUCOSE): Status: RESOLVED | Noted: 2021-08-02 | Resolved: 2022-09-22

## 2022-09-22 PROCEDURE — 1036F TOBACCO NON-USER: CPT | Performed by: NURSE PRACTITIONER

## 2022-09-22 PROCEDURE — 1123F ACP DISCUSS/DSCN MKR DOCD: CPT | Performed by: NURSE PRACTITIONER

## 2022-09-22 PROCEDURE — 3017F COLORECTAL CA SCREEN DOC REV: CPT | Performed by: NURSE PRACTITIONER

## 2022-09-22 PROCEDURE — 90694 VACC AIIV4 NO PRSRV 0.5ML IM: CPT | Performed by: NURSE PRACTITIONER

## 2022-09-22 PROCEDURE — G0008 ADMIN INFLUENZA VIRUS VAC: HCPCS | Performed by: NURSE PRACTITIONER

## 2022-09-22 PROCEDURE — 1090F PRES/ABSN URINE INCON ASSESS: CPT | Performed by: NURSE PRACTITIONER

## 2022-09-22 PROCEDURE — 99214 OFFICE O/P EST MOD 30 MIN: CPT | Performed by: NURSE PRACTITIONER

## 2022-09-22 PROCEDURE — G8417 CALC BMI ABV UP PARAM F/U: HCPCS | Performed by: NURSE PRACTITIONER

## 2022-09-22 PROCEDURE — G8399 PT W/DXA RESULTS DOCUMENT: HCPCS | Performed by: NURSE PRACTITIONER

## 2022-09-22 PROCEDURE — G8427 DOCREV CUR MEDS BY ELIG CLIN: HCPCS | Performed by: NURSE PRACTITIONER

## 2022-09-22 RX ORDER — ESTRADIOL 0.1 MG/G
CREAM VAGINAL
COMMUNITY
Start: 2022-08-26

## 2022-09-22 SDOH — ECONOMIC STABILITY: FOOD INSECURITY: WITHIN THE PAST 12 MONTHS, THE FOOD YOU BOUGHT JUST DIDN'T LAST AND YOU DIDN'T HAVE MONEY TO GET MORE.: NEVER TRUE

## 2022-09-22 SDOH — ECONOMIC STABILITY: FOOD INSECURITY: WITHIN THE PAST 12 MONTHS, YOU WORRIED THAT YOUR FOOD WOULD RUN OUT BEFORE YOU GOT MONEY TO BUY MORE.: NEVER TRUE

## 2022-09-22 ASSESSMENT — ENCOUNTER SYMPTOMS
DIARRHEA: 0
ALLERGIC/IMMUNOLOGIC NEGATIVE: 1
SHORTNESS OF BREATH: 0
CHEST TIGHTNESS: 0
VOMITING: 0
NAUSEA: 0
EYES NEGATIVE: 1
COLOR CHANGE: 0
RESPIRATORY NEGATIVE: 1
COUGH: 0
GASTROINTESTINAL NEGATIVE: 1

## 2022-09-22 ASSESSMENT — SOCIAL DETERMINANTS OF HEALTH (SDOH): HOW HARD IS IT FOR YOU TO PAY FOR THE VERY BASICS LIKE FOOD, HOUSING, MEDICAL CARE, AND HEATING?: NOT HARD AT ALL

## 2022-09-22 NOTE — PATIENT INSTRUCTIONS
[address] [facility]   [address] [place] [address], [facility], [address] [place] [address]   [phone]

## 2022-09-22 NOTE — PROGRESS NOTES
[facility]  [address] [facility]  Dept: [address] is a 74 y.o. female who presents today for her medical conditions/complaintsas noted below. Micki Griffiths is here today c/o Atrial Fibrillation, Hyperlipidemia, and Flu Vaccine    Past Medical History:   Diagnosis Date    A-fib [facility])     Anxiety     Atrial fibrillation ([address] 2018    Degenerative disc disease, lumbar 2018    Depression     Frequent UTI     Infertility, female     Migraine without aura and without status migrainosus, not intractable 2022    MIRELLA (obstructive sleep apnea) 2022    Osteoarthritis     Restless leg syndrome     Squamous cell carcinoma in situ     Thyroid nodule, Rt. Lobe.  27 mm     Urge incontinence 2020      Past Surgical History:   Procedure Laterality Date    BREAST BIOPSY      CATARACT REMOVAL  2007    right    COLONOSCOPY N/A 2021    COLONOSCOPY DIAGNOSTIC WITH BIOPSY performed by Ramila Acevedo MD at [facility]      \" as a child\"    EYE SURGERY  2013    cateract removal    TONSILLECTOMY Bilateral 1963    TONSILLECTOMY AND ADENOIDECTOMY      UPPER GASTROINTESTINAL ENDOSCOPY N/A 2021    EGD ESOPHAGOGASTRODUODENOSCOPY performed by Ramila Acevedo MD at [address] History   Problem Relation Age of Onset    Cancer Mother         pancreatic    Hypertension Mother     Osteoporosis Mother     Cancer Father     Lung Cancer Father     Arthritis Sister     Diabetes Paternal Grandmother     Breast Cancer Paternal Grandmother        Social History     Tobacco Use    Smoking status: Former     Packs/day: 2.00     Years: 3.00     Pack years: 6.00     Types: Cigarettes     Quit date: 2021     Years since quittin.3    Smokeless tobacco: Never    Tobacco comments:     quit 2 months ago, started again   Substance Use Topics    Alcohol use: No     Comment: rarely      Current Outpatient Medications   Medication Sig Dispense Refill    estradiol (ESTRACE) 0.1 MG/GM vaginal cream Twice/ week      butalbital-acetaminophen-caffeine (FIORICET, ESGIC) -40 MG per tablet Take one tab every other day as needed for severe headache 40 tablet 3    verapamil (CALAN SR) 120 MG extended release tablet Take 1 tablet by mouth daily 90 tablet 3    buPROPion (WELLBUTRIN XL) 300 MG extended release tablet take 1 tablet by mouth every morning 90 tablet 1    temazepam (RESTORIL) 15 MG capsule TAKE 1 CAPSULE BY MOUTH AT BEDTIME (Patient not taking: Reported on 8/30/2022)  2    LYRICA 200 MG capsule Take 2 capsules by mouth daily. 5    rOPINIRole (REQUIP) 1 MG tablet Take 1 tablet by mouth 4 times daily 90 tablet 3    Multiple Vitamin (MULTIVITAMIN) tablet Take 1 tablet by mouth daily 30 tablet 3     No current facility-administered medications for this visit.      Allergies   Allergen Reactions    Pcn [Penicillins] Other (See Comments)     \" as a child\"      Neomycin Rash    Penicillin G Rash         HPI:     HPI    Presents today c/o routine follow-up    Saw Urology for recurrent dysuria sx  She was started on topical estrogen and symptoms resolved     H/o depression   Taking Wellbutrin     H/o atrial fib s/p ablation   Rate control & anticoagulation was d/c by specialist     H/o headaches   Follows w/ Neurology   Taking verapamil    H/o RLS   Follows w/ Sleep Medicine   [facility]   Taking restoril, requip & Lyrica     Recently had routine labs completed which were WNL    The 10-year ASCVD risk score (Tessie Kenny, et al., 2013) is: 4.1%    Values used to calculate the score:      Age: 74 years      Sex: Female      Is Non- : No      Diabetic: No      Tobacco smoker: No      Systolic Blood Pressure: 202 mmHg      Is BP treated: No      HDL Cholesterol: 58 mg/dL      Total Cholesterol: 158 mg/dL     Health Maintenance:      Subjective:     Review of Systems   Constitutional: Negative. Negative for appetite change, chills, diaphoresis, fatigue, fever and unexpected weight change. HENT: Negative. Eyes: Negative. Respiratory: Negative. Negative for cough, chest tightness and shortness of breath. Cardiovascular: Negative. Negative for chest pain and leg swelling. Gastrointestinal: Negative. Negative for diarrhea, nausea and vomiting. Endocrine: Negative. Genitourinary: Negative. Negative for difficulty urinating and dysuria. Musculoskeletal: Negative. Skin: Negative. Negative for color change, pallor, rash and wound. Allergic/Immunologic: Negative. Neurological: Negative. Negative for dizziness, seizures, syncope, light-headedness and headaches. Hematological: Negative. Psychiatric/Behavioral: Negative. Negative for dysphoric mood. The patient is not nervous/anxious. Objective:     Vitals:    09/22/22 1024   BP: 118/78   Pulse: 90   Temp: 97.6 °F (36.4 °C)   SpO2: 98%       Body mass index is 32.75 kg/m². Physical Exam  Constitutional:       General: She is not in acute distress. Appearance: Normal appearance. She is well-developed. She is obese. She is not ill-appearing, toxic-appearing or diaphoretic. HENT:      Head: Normocephalic and atraumatic. Right Ear: External ear normal.      Left Ear: External ear normal.      Nose: Nose normal.   Eyes:      General: No scleral icterus. Right eye: No discharge. Left eye: No discharge. Conjunctiva/sclera: Conjunctivae normal.      Pupils: Pupils are equal, round, and reactive to light. Neck:      Trachea: No tracheal deviation. Cardiovascular:      Rate and Rhythm: Normal rate and regular rhythm. Heart sounds: Normal heart sounds. No murmur heard. No friction rub. No gallop. Pulmonary:      Effort: Pulmonary effort is normal. No tachypnea, accessory muscle usage or respiratory distress.       Breath sounds: Normal breath sounds. No stridor. No decreased breath sounds, wheezing, rhonchi or rales. Abdominal:      Palpations: Abdomen is soft. Musculoskeletal:         General: No tenderness or deformity. Normal range of motion. Cervical back: Normal range of motion and neck supple. Skin:     General: Skin is warm and dry. Coloration: Skin is not pale. Findings: No erythema or rash. Neurological:      Mental Status: She is alert and oriented to person, place, and time. GCS: GCS eye subscore is 4. GCS verbal subscore is 5. GCS motor subscore is 6. Gait: Gait is intact. Gait normal.   Psychiatric:         Speech: Speech normal.         Behavior: Behavior normal.         Thought Content: Thought content normal.         Judgment: Judgment normal.         Assessment:         1. Atrophic vaginitis    2. Paroxysmal atrial fibrillation (HCC)    3. S/P ablation of atrial fibrillation    4. RLS (restless legs syndrome)    5. Mixed anxiety depressive disorder    6. Immunization due        Plan:     1. Atrophic vaginitis    - [facility]    Encouraged GYN follow-up & PAP update    2. Paroxysmal atrial fibrillation (HCC)    S/p successful ablation     3. S/P ablation of atrial fibrillation    4. RLS (restless legs syndrome)    Follows w/ Sleep Medicine     5. Mixed anxiety depressive disorder    Doing well on Wellbutrin     6. Immunization due    - Influenza, FLUAD, (age 72 y+), IM, Preservative Free, 0.5 mL@        Discussed use, benefit, and side effects of prescribed medications. All patient questions answered. Pt voiced understanding. Reviewed health maintenance. Instructed to continue current medications, diet and exercise. Patient agreedwith treatment plan. Follow up as directed.      Electronically signed by DESMOND Blackwell CNP on 9/22/2022

## 2022-09-29 VITALS — WEIGHT: 202 LBS | BODY MASS INDEX: 33.66 KG/M2 | HEIGHT: 65 IN

## 2022-09-29 NOTE — LETTER
9/29/2022        [place] [address]    [facility]    Your healthcare provider has ordered a low dose CT scan of the chest for lung cancer screening. Enclosed you will find information about CT lung screening and smoking cessation resources. If you are unable to keep you appointment for you CT lung screening, please call our scheduling department at [phone]    Keep in mind that CT lung screening does not take the place of smoking cessation. Please do not hesitate to contact me if you have any questions or concerns.     [address] [facility],      [facility]  [phone]

## 2022-10-01 ENCOUNTER — APPOINTMENT (OUTPATIENT)
Dept: GENERAL RADIOLOGY | Age: 67
End: 2022-10-01
Payer: MEDICARE

## 2022-10-01 ENCOUNTER — HOSPITAL ENCOUNTER (EMERGENCY)
Age: 67
Discharge: HOME | End: 2022-10-01
Attending: EMERGENCY MEDICINE
Payer: MEDICARE

## 2022-10-01 VITALS
HEIGHT: 65 IN | DIASTOLIC BLOOD PRESSURE: 88 MMHG | TEMPERATURE: 98.1 F | RESPIRATION RATE: 20 BRPM | OXYGEN SATURATION: 95 % | SYSTOLIC BLOOD PRESSURE: 146 MMHG | WEIGHT: 200 LBS | BODY MASS INDEX: 33.32 KG/M2 | HEART RATE: 100 BPM
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
## 2022-10-01 PROCEDURE — 73060 X-RAY EXAM OF HUMERUS: CPT

## 2022-10-01 PROCEDURE — 90471 IMMUNIZATION ADMIN: CPT | Performed by: EMERGENCY MEDICINE

## 2022-10-01 PROCEDURE — 6360000002 HC RX W HCPCS: Performed by: EMERGENCY MEDICINE

## 2022-10-01 PROCEDURE — 90714 TD VACC NO PRESV 7 YRS+ IM: CPT | Performed by: EMERGENCY MEDICINE

## 2022-10-01 PROCEDURE — 99284 EMERGENCY DEPT VISIT MOD MDM: CPT

## 2022-10-01 RX ORDER — SULFAMETHOXAZOLE AND TRIMETHOPRIM 800; 160 MG/1; MG/1
1 TABLET ORAL 2 TIMES DAILY
Qty: 14 TABLET | Refills: 0 | Status: SHIPPED | OUTPATIENT
Start: 2022-10-01 | End: 2022-10-08

## 2022-10-01 RX ORDER — TETANUS AND DIPHTHERIA TOXOIDS ADSORBED 2; 2 [LF]/.5ML; [LF]/.5ML
0.5 INJECTION INTRAMUSCULAR ONCE
Status: COMPLETED | OUTPATIENT
Start: 2022-10-01 | End: 2022-10-01

## 2022-10-01 RX ORDER — CLINDAMYCIN HYDROCHLORIDE 150 MG/1
300 CAPSULE ORAL 3 TIMES DAILY
Qty: 42 CAPSULE | Refills: 0 | Status: SHIPPED | OUTPATIENT
Start: 2022-10-01 | End: 2022-10-08

## 2022-10-01 RX ADMIN — TETANUS AND DIPHTHERIA TOXOIDS ADSORBED 0.5 ML: 2; 2 INJECTION INTRAMUSCULAR at 19:07

## 2022-10-01 ASSESSMENT — ENCOUNTER SYMPTOMS
ABDOMINAL PAIN: 0
BACK PAIN: 0
SHORTNESS OF BREATH: 0
CHEST TIGHTNESS: 0
COLOR CHANGE: 0
TROUBLE SWALLOWING: 0
VOMITING: 0
EYE PAIN: 0
VOICE CHANGE: 0
NAUSEA: 0
PHOTOPHOBIA: 0
FACIAL SWELLING: 0

## 2022-10-01 NOTE — ED NOTES
Pt presenting to the ED with a dog bite to the Right arm. Pt states that she was outside and a neighbors dog attacked her. Pt is A&Ox4.       Sendy Holman RN  10/01/22 8646

## 2022-10-01 NOTE — ED PROVIDER NOTES
EMERGENCY DEPARTMENT ENCOUNTER    Pt Name: Porter Goodpasture  MRN: [id number]  Armsandrewgfmukesh 1955  Date of evaluation: 10/1/22  CHIEF COMPLAINT       Chief Complaint   Patient presents with    Animal Bite     Back of right arm, dressing in place by ems     HISTORY OF PRESENT ILLNESS   57-year-old female presenting to the ER after a dog bite to the right upper extremity. The patient was concerned about a possible rabies infection. The patient states that she felt the dog's teeth multiple times but she feels like there was only a puncture once or twice. Patient denies falling after the event. Patient states she did bleed after the event but is primarily concerned about possible rabies. The owner of the dog was spoken with and she did state that the dog is not vaccinated. The history is provided by the patient. Animal Bite  Contact animal:  Dog  Location:  Shoulder/arm  Shoulder/arm injury location:  R upper arm  Time since incident:  1 hour  Incident location:  Outside  Provoked: unprovoked    Notifications:  Law enforcement  Animal's rabies vaccination status:  Never received  Tetanus status:  Out of date  Associated symptoms: no rash          REVIEW OF SYSTEMS     Review of Systems   Constitutional:  Negative for activity change, appetite change and fatigue. HENT:  Negative for facial swelling, trouble swallowing and voice change. Eyes:  Negative for photophobia and pain. Respiratory:  Negative for chest tightness and shortness of breath. Cardiovascular:  Negative for chest pain and palpitations. Gastrointestinal:  Negative for abdominal pain, nausea and vomiting. Genitourinary:  Negative for dysuria and urgency. Musculoskeletal:  Negative for arthralgias and back pain. Skin:  Positive for wound (RUE). Negative for color change and rash. Neurological:  Negative for dizziness, syncope and headaches. Psychiatric/Behavioral:  Negative for behavioral problems and hallucinations. PASTMEDICAL HISTORY     Past Medical History:   Diagnosis Date    A-fib [facility])     Anxiety     Atrial fibrillation ([address] 08/21/2018    Degenerative disc disease, lumbar 05/2018    Depression     Frequent UTI     Infertility, female     Migraine without aura and without status migrainosus, not intractable 5/24/2022    MIRELLA (obstructive sleep apnea) 1/14/2022    Osteoarthritis     Restless leg syndrome     Squamous cell carcinoma in situ     Thyroid nodule, Rt. Lobe. 27 mm     Urge incontinence 07/07/2020     Past Problem List  Patient Active Problem List   Diagnosis Code    Current smoker F17.200    Mixed anxiety depressive disorder F41.8    RLS (restless legs syndrome) G25.81    Sleep disturbance G47.9    Thyroid nodule, Rt. Lobe. 27 mm E04.1    Atrial fibrillation (HCC) I48.91    Depression F32. A    Urge incontinence N39.41    S/P ablation of atrial fibrillation Z98.890, Z86.79    Dilation of biliary tract K83.8    MIRELLA (obstructive sleep apnea) G47.33    Migraine without aura and without status migrainosus, not intractable G43.009    Atrophic vaginitis N95.2     SURGICAL HISTORY       Past Surgical History:   Procedure Laterality Date    BREAST BIOPSY      CATARACT REMOVAL  2007    right    COLONOSCOPY N/A 9/30/2021    COLONOSCOPY DIAGNOSTIC WITH BIOPSY performed by Radhika Quezada MD at [facility]      \" as a child\"    EYE SURGERY  2013    cateract removal    TONSILLECTOMY Bilateral 1963    TONSILLECTOMY AND ADENOIDECTOMY      UPPER GASTROINTESTINAL ENDOSCOPY N/A 9/30/2021    EGD ESOPHAGOGASTRODUODENOSCOPY performed by Radhika Quezada MD at [facility]       Previous Medications    BUPROPION (WELLBUTRIN XL) 300 MG EXTENDED RELEASE TABLET    take 1 tablet by mouth every morning    BUTALBITAL-ACETAMINOPHEN-CAFFEINE (FIORICET, ESGIC) -40 MG PER TABLET    Take one tab every other day as needed for severe headache    ESTRADIOL (ESTRACE) 0.1 MG/GM VAGINAL CREAM    Twice/ week    LYRICA 200 MG CAPSULE    Take 2 capsules by mouth daily. MULTIPLE VITAMIN (MULTIVITAMIN) TABLET    Take 1 tablet by mouth daily    ROPINIROLE (REQUIP) 1 MG TABLET    Take 1 tablet by mouth 4 times daily    TEMAZEPAM (RESTORIL) 15 MG CAPSULE    TAKE 1 CAPSULE BY MOUTH AT BEDTIME    VERAPAMIL (CALAN SR) 120 MG EXTENDED RELEASE TABLET    Take 1 tablet by mouth daily     ALLERGIES     is allergic to pcn [penicillins], neomycin, and penicillin g. FAMILY HISTORY     She indicated that her mother is . She indicated that her father is . She indicated that the status of her sister is unknown. She indicated that the status of her paternal grandmother is unknown. SOCIAL HISTORY       Social History     Tobacco Use    Smoking status: Former     Packs/day: 1.00     Years: 30.00     Pack years: 30.00     Types: Cigarettes     Quit date: 2021     Years since quittin.3    Smokeless tobacco: Never    Tobacco comments:     quit 2 months ago, started again   Vaping Use    Vaping Use: Former   Substance Use Topics    Alcohol use: No     Comment: rarely    Drug use: No     PHYSICAL EXAM     INITIAL VITALS: BP (!) 146/88   Pulse 100   Temp 98.1 °F (36.7 °C)   Resp 20   Ht 5' 5\" (1.651 m)   Wt 200 lb (90.7 kg)   SpO2 95%   BMI 33.28 kg/m²    Physical Exam  Vitals reviewed. Constitutional:       General: She is not in acute distress. Appearance: Normal appearance. She is not ill-appearing or toxic-appearing. HENT:      Head: Normocephalic and atraumatic. Right Ear: External ear normal.      Left Ear: External ear normal.      Nose: No congestion or rhinorrhea. Eyes:      Extraocular Movements: Extraocular movements intact. Pupils: Pupils are equal, round, and reactive to light. Cardiovascular:      Rate and Rhythm: Normal rate and regular rhythm. Pulses: Normal pulses. Heart sounds: Normal heart sounds.    Pulmonary:      Effort: Pulmonary effort is normal. No respiratory distress. Breath sounds: Normal breath sounds. No wheezing. Abdominal:      General: Bowel sounds are normal. There is no distension. Palpations: Abdomen is soft. Tenderness: There is no abdominal tenderness. Musculoskeletal:         General: No deformity or signs of injury. Normal range of motion. Cervical back: No rigidity or tenderness. Skin:     General: Skin is warm and dry. Neurological:      Mental Status: She is alert and oriented to person, place, and time. Mental status is at baseline. Psychiatric:         Mood and Affect: Mood normal.         Behavior: Behavior normal.       MEDICAL DECISION MAKIN-year-old female with a dog bite to the right upper extremity. X-ray imaging did not display signs of acute abnormality. Patient was prescribed an antibiotic for outpatient use. The wound was cleaned with normal saline and the patient was provided with a tetanus shot in the ER. Patient instructed to follow-up with the primary care physician in 2 days. Patient instructed to return to the ER immediately if symptoms worsen or change. Patient understands and agrees with the plan. CRITICAL CARE:       PROCEDURES:    Procedures    DIAGNOSTIC RESULTS   EKG:All EKG's are interpreted by the Emergency Department Physician who either signs or Co-signs this chart in the absence of a cardiologist.        RADIOLOGY:All plain film, CT, MRI, and formal ultrasound images (except ED bedside ultrasound) are read by the radiologist, see reports below, unless otherwisenoted in MDM or here. XR HUMERUS RIGHT (MIN 2 VIEWS)   Final Result   Known bite injury with no radiographic abnormality on current humerus exam.           LABS: All lab results were reviewed by myself, and all abnormals are listed below.   Labs Reviewed - No data to display    EMERGENCY DEPARTMENTCOURSE:         Vitals:    Vitals:    10/01/22 1741   BP: (!) 146/88 Pulse: 100   Resp: 20   Temp: 98.1 °F (36.7 °C)   SpO2: 95%   Weight: 200 lb (90.7 kg)   Height: 5' 5\" (1.651 m)       The patient was given the following medications while in the emergency department:  Orders Placed This Encounter   Medications    diptheria-tetanus toxoids (DECAVAC) 2-2 LF/0.5ML injection 0.5 mL    clindamycin (CLEOCIN) 150 MG capsule     Sig: Take 2 capsules by mouth 3 times daily for 7 days     Dispense:  42 capsule     Refill:  0    sulfamethoxazole-trimethoprim (BACTRIM DS) 800-160 MG per tablet     Sig: Take 1 tablet by mouth 2 times daily for 7 days     Dispense:  14 tablet     Refill:  0     CONSULTS:  None    FINAL IMPRESSION      1. Dog bite, initial encounter          DISPOSITION/PLAN   DISPOSITION Decision To Discharge 10/01/2022 06:53:34 PM      PATIENT REFERRED TO:  DESMOND Willoughby - [facility]  [address]  [phone]    Schedule an appointment as soon as possible for a visit in 2 days      [facility]  [address]  [phone]  Go to   As needed, If symptoms worsen  DISCHARGE MEDICATIONS:  New Prescriptions    CLINDAMYCIN (CLEOCIN) 150 MG CAPSULE    Take 2 capsules by mouth 3 times daily for 7 days    SULFAMETHOXAZOLE-TRIMETHOPRIM (BACTRIM DS) 800-160 MG PER TABLET    Take 1 tablet by mouth 2 times daily for 7 days     The care is provided during an unprecedented national emergency due to the novel coronavirus, COVID 19.   DO Lino Mendenhall DO  10/01/22 1910

## 2022-10-01 NOTE — ED NOTES
Pt provided discharge instructions and extra supplies to dress and clean wound. After care instructions given.       Marquis Kishore RN  10/01/22 1919

## 2022-10-05 ENCOUNTER — OFFICE VISIT (OUTPATIENT)
Dept: FAMILY MEDICINE CLINIC | Age: 67
End: 2022-10-05
Payer: MEDICARE

## 2022-10-05 VITALS
OXYGEN SATURATION: 96 % | BODY MASS INDEX: 31.95 KG/M2 | DIASTOLIC BLOOD PRESSURE: 70 MMHG | HEART RATE: 78 BPM | WEIGHT: 192 LBS | SYSTOLIC BLOOD PRESSURE: 120 MMHG

## 2022-10-05 DIAGNOSIS — S41.151D DOG BITE OF RIGHT UPPER ARM, SUBSEQUENT ENCOUNTER: ICD-10-CM

## 2022-10-05 DIAGNOSIS — Z09 HOSPITAL DISCHARGE FOLLOW-UP: Primary | ICD-10-CM
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
## 2022-10-05 PROBLEM — N32.81 OVERACTIVE BLADDER: Status: ACTIVE | Noted: 2022-10-05

## 2022-10-05 PROBLEM — N30.20 CHRONIC CYSTITIS: Status: ACTIVE | Noted: 2022-10-05

## 2022-10-05 PROBLEM — Q87.89: Status: ACTIVE | Noted: 2022-10-05

## 2022-10-05 PROBLEM — L30.4 INTERTRIGO: Status: ACTIVE | Noted: 2022-10-05

## 2022-10-05 PROBLEM — L20.9 ATOPIC DERMATITIS: Status: ACTIVE | Noted: 2022-10-05

## 2022-10-05 PROBLEM — C44.91 BASAL CELL CARCINOMA OF SKIN: Status: ACTIVE | Noted: 2022-10-05

## 2022-10-05 PROBLEM — C44.92 SQUAMOUS CELL CARCINOMA OF SKIN: Status: ACTIVE | Noted: 2022-10-05

## 2022-10-05 PROBLEM — I47.29 NSVT (NONSUSTAINED VENTRICULAR TACHYCARDIA) (HCC): Status: ACTIVE | Noted: 2018-10-10

## 2022-10-05 PROBLEM — Q87.5: Status: ACTIVE | Noted: 2022-10-05

## 2022-10-05 PROCEDURE — 1123F ACP DISCUSS/DSCN MKR DOCD: CPT | Performed by: FAMILY MEDICINE

## 2022-10-05 PROCEDURE — G8484 FLU IMMUNIZE NO ADMIN: HCPCS | Performed by: FAMILY MEDICINE

## 2022-10-05 PROCEDURE — G8417 CALC BMI ABV UP PARAM F/U: HCPCS | Performed by: FAMILY MEDICINE

## 2022-10-05 PROCEDURE — G8427 DOCREV CUR MEDS BY ELIG CLIN: HCPCS | Performed by: FAMILY MEDICINE

## 2022-10-05 PROCEDURE — 3017F COLORECTAL CA SCREEN DOC REV: CPT | Performed by: FAMILY MEDICINE

## 2022-10-05 PROCEDURE — 1036F TOBACCO NON-USER: CPT | Performed by: FAMILY MEDICINE

## 2022-10-05 PROCEDURE — 1111F DSCHRG MED/CURRENT MED MERGE: CPT | Performed by: FAMILY MEDICINE

## 2022-10-05 PROCEDURE — 99214 OFFICE O/P EST MOD 30 MIN: CPT | Performed by: FAMILY MEDICINE

## 2022-10-05 PROCEDURE — 1090F PRES/ABSN URINE INCON ASSESS: CPT | Performed by: FAMILY MEDICINE

## 2022-10-05 PROCEDURE — G8399 PT W/DXA RESULTS DOCUMENT: HCPCS | Performed by: FAMILY MEDICINE

## 2022-10-05 ASSESSMENT — ENCOUNTER SYMPTOMS
EYES NEGATIVE: 1
ALLERGIC/IMMUNOLOGIC NEGATIVE: 1
RESPIRATORY NEGATIVE: 1
GASTROINTESTINAL NEGATIVE: 1

## 2022-10-06 ENCOUNTER — HOSPITAL ENCOUNTER (OUTPATIENT)
Dept: CT IMAGING | Age: 67
Discharge: HOME | End: 2022-10-08
Payer: MEDICARE

## 2022-10-06 VITALS — HEIGHT: 65 IN | WEIGHT: 200 LBS | BODY MASS INDEX: 33.32 KG/M2

## 2022-10-06 DIAGNOSIS — Z87.891 PERSONAL HISTORY OF TOBACCO USE: ICD-10-CM

## 2022-10-06 DIAGNOSIS — F17.211 CIGARETTE NICOTINE DEPENDENCE IN REMISSION: ICD-10-CM

## 2022-10-06 PROCEDURE — 71271 CT THORAX LUNG CANCER SCR C-: CPT

## 2022-12-23 DIAGNOSIS — F39 MOOD DISORDER (HCC): ICD-10-CM

## 2022-12-23 RX ORDER — BUPROPION HYDROCHLORIDE 300 MG/1
300 TABLET ORAL EVERY MORNING
Qty: 90 TABLET | Refills: 0 | Status: SHIPPED | OUTPATIENT
Start: 2022-12-23 | End: 2023-01-05 | Stop reason: SDUPTHER

## 2022-12-23 NOTE — TELEPHONE ENCOUNTER
Ezekiel Shipley is calling to request a refill on the following medication(s):    Medication Request:  Requested Prescriptions     Pending Prescriptions Disp Refills    buPROPion (WELLBUTRIN XL) 300 MG extended release tablet [Pharmacy Med Name: BUPROPION HCL  MG TABLET] 90 tablet 1     Sig: take 1 tablet by mouth every morning       Last Visit Date (If Applicable):  7/80/5700    Next Visit Date:    1/5/2023

## 2023-01-05 ENCOUNTER — OFFICE VISIT (OUTPATIENT)
Dept: FAMILY MEDICINE CLINIC | Age: 68
End: 2023-01-05
Payer: MEDICARE

## 2023-01-05 VITALS
WEIGHT: 195.8 LBS | HEART RATE: 83 BPM | TEMPERATURE: 97.4 F | SYSTOLIC BLOOD PRESSURE: 110 MMHG | DIASTOLIC BLOOD PRESSURE: 74 MMHG | BODY MASS INDEX: 32.58 KG/M2 | OXYGEN SATURATION: 94 %

## 2023-01-05 DIAGNOSIS — F39 MOOD DISORDER (HCC): Primary | ICD-10-CM

## 2023-01-05 DIAGNOSIS — I48.0 PAROXYSMAL ATRIAL FIBRILLATION (HCC): ICD-10-CM

## 2023-01-05 DIAGNOSIS — N95.2 ATROPHIC VAGINITIS: ICD-10-CM

## 2023-01-05 DIAGNOSIS — Z13.31 POSITIVE DEPRESSION SCREENING: ICD-10-CM

## 2023-01-05 DIAGNOSIS — G47.33 OSA (OBSTRUCTIVE SLEEP APNEA): ICD-10-CM

## 2023-01-05 DIAGNOSIS — G25.81 RLS (RESTLESS LEGS SYNDROME): ICD-10-CM

## 2023-01-05 PROCEDURE — G8399 PT W/DXA RESULTS DOCUMENT: HCPCS | Performed by: NURSE PRACTITIONER

## 2023-01-05 PROCEDURE — 99214 OFFICE O/P EST MOD 30 MIN: CPT | Performed by: NURSE PRACTITIONER

## 2023-01-05 PROCEDURE — 1036F TOBACCO NON-USER: CPT | Performed by: NURSE PRACTITIONER

## 2023-01-05 PROCEDURE — 1090F PRES/ABSN URINE INCON ASSESS: CPT | Performed by: NURSE PRACTITIONER

## 2023-01-05 PROCEDURE — 3017F COLORECTAL CA SCREEN DOC REV: CPT | Performed by: NURSE PRACTITIONER

## 2023-01-05 PROCEDURE — G8427 DOCREV CUR MEDS BY ELIG CLIN: HCPCS | Performed by: NURSE PRACTITIONER

## 2023-01-05 PROCEDURE — G8484 FLU IMMUNIZE NO ADMIN: HCPCS | Performed by: NURSE PRACTITIONER

## 2023-01-05 PROCEDURE — G8417 CALC BMI ABV UP PARAM F/U: HCPCS | Performed by: NURSE PRACTITIONER

## 2023-01-05 PROCEDURE — 1123F ACP DISCUSS/DSCN MKR DOCD: CPT | Performed by: NURSE PRACTITIONER

## 2023-01-05 RX ORDER — BUPROPION HYDROCHLORIDE 300 MG/1
300 TABLET ORAL EVERY MORNING
Qty: 90 TABLET | Refills: 2 | Status: SHIPPED | OUTPATIENT
Start: 2023-01-05

## 2023-01-05 ASSESSMENT — PATIENT HEALTH QUESTIONNAIRE - PHQ9
SUM OF ALL RESPONSES TO PHQ QUESTIONS 1-9: 10
10. IF YOU CHECKED OFF ANY PROBLEMS, HOW DIFFICULT HAVE THESE PROBLEMS MADE IT FOR YOU TO DO YOUR WORK, TAKE CARE OF THINGS AT HOME, OR GET ALONG WITH OTHER PEOPLE: 1
5. POOR APPETITE OR OVEREATING: 1
SUM OF ALL RESPONSES TO PHQ QUESTIONS 1-9: 10
2. FEELING DOWN, DEPRESSED OR HOPELESS: 2
8. MOVING OR SPEAKING SO SLOWLY THAT OTHER PEOPLE COULD HAVE NOTICED. OR THE OPPOSITE, BEING SO FIGETY OR RESTLESS THAT YOU HAVE BEEN MOVING AROUND A LOT MORE THAN USUAL: 0
SUM OF ALL RESPONSES TO PHQ QUESTIONS 1-9: 10
7. TROUBLE CONCENTRATING ON THINGS, SUCH AS READING THE NEWSPAPER OR WATCHING TELEVISION: 1
SUM OF ALL RESPONSES TO PHQ9 QUESTIONS 1 & 2: 4
6. FEELING BAD ABOUT YOURSELF - OR THAT YOU ARE A FAILURE OR HAVE LET YOURSELF OR YOUR FAMILY DOWN: 0
9. THOUGHTS THAT YOU WOULD BE BETTER OFF DEAD, OR OF HURTING YOURSELF: 0
SUM OF ALL RESPONSES TO PHQ QUESTIONS 1-9: 10
1. LITTLE INTEREST OR PLEASURE IN DOING THINGS: 2
4. FEELING TIRED OR HAVING LITTLE ENERGY: 2
3. TROUBLE FALLING OR STAYING ASLEEP: 2

## 2023-01-05 ASSESSMENT — ENCOUNTER SYMPTOMS
CHEST TIGHTNESS: 0
COLOR CHANGE: 0
ALLERGIC/IMMUNOLOGIC NEGATIVE: 1
DIARRHEA: 0
GASTROINTESTINAL NEGATIVE: 1
EYES NEGATIVE: 1
WHEEZING: 0
VOMITING: 0
COUGH: 0
SHORTNESS OF BREATH: 0
RESPIRATORY NEGATIVE: 1
NAUSEA: 0

## 2023-01-05 NOTE — PROGRESS NOTES
[facility]  [address] [facility]  Dept: [address] is a 74 y.o. female who presents today for her medical conditions/complaints as noted below. Alfred Ellsworth is here today c/o Medication Check    Past Medical History:   Diagnosis Date    A-fib [facility])     Anxiety     Atrial fibrillation ([address] 2018    Basal cell carcinoma of skin 10/5/2022    Degenerative disc disease, lumbar 2018    Depression     Frequent UTI     Infertility, female     Migraine without aura and without status migrainosus, not intractable 2022    MIRELLA (obstructive sleep apnea) 2022    Osteoarthritis     Restless leg syndrome     Squamous cell carcinoma in situ     Thyroid nodule, Rt. Lobe.  27 mm     Urge incontinence 2020      Past Surgical History:   Procedure Laterality Date    BREAST BIOPSY      CATARACT REMOVAL  2007    right    COLONOSCOPY N/A 2021    COLONOSCOPY DIAGNOSTIC WITH BIOPSY performed by Rafaela Le MD at [facility]      \" as a child\"    EYE SURGERY  2013    cateract removal    TONSILLECTOMY Bilateral 1963    TONSILLECTOMY AND ADENOIDECTOMY      UPPER GASTROINTESTINAL ENDOSCOPY N/A 2021    EGD ESOPHAGOGASTRODUODENOSCOPY performed by Rafaela Le MD at [address] History   Problem Relation Age of Onset    Cancer Mother         pancreatic    Hypertension Mother     Osteoporosis Mother     Cancer Father     Lung Cancer Father     Arthritis Sister     Diabetes Paternal Grandmother     Breast Cancer Paternal Grandmother        Social History     Tobacco Use    Smoking status: Former     Packs/day: 0.50     Years: 35.00     Pack years: 17.50     Types: Cigarettes     Quit date: 2021     Years since quittin.6    Smokeless tobacco: Never   Substance Use Topics    Alcohol use: No     Comment: rarely      Current Outpatient Medications   Medication Sig Dispense Refill    buPROPion (WELLBUTRIN XL) 300 MG extended release tablet take 1 tablet by mouth every morning 90 tablet 0    estradiol (ESTRACE) 0.1 MG/GM vaginal cream Twice/ week      butalbital-acetaminophen-caffeine (FIORICET, ESGIC) -40 MG per tablet Take one tab every other day as needed for severe headache 40 tablet 3    verapamil (CALAN SR) 120 MG extended release tablet Take 1 tablet by mouth daily 90 tablet 3    temazepam (RESTORIL) 15 MG capsule   2    LYRICA 200 MG capsule Take 2 capsules by mouth daily. 5    rOPINIRole (REQUIP) 1 MG tablet Take 1 tablet by mouth 4 times daily 90 tablet 3    Multiple Vitamin (MULTIVITAMIN) tablet Take 1 tablet by mouth daily 30 tablet 3     No current facility-administered medications for this visit. Allergies   Allergen Reactions    Pcn [Penicillins] Other (See Comments)     \" as a child\"      Neomycin Rash    Penicillin G Rash         HPI:     HPI    Presents today c/o routine follow-up     Follows w/ Dr. Rosalino Ruiz Urology for atrophic vaginitis      H/o depression / mood disorder   Taking Wellbutrin   Reports depression, lack of interest, sleep disturbance, & fatigue  Reports trigger of job loss back in September but things are getting better  She feels she'd like to talk to someone about this & has seen Dr. Danelle Kaminski in the past  No SI/HI  PHQ 9 - 10      H/o atrial fib s/p ablation   She no longer follows with Cardiology      H/o headaches   Follows w/ Neurology   Taking verapamil     H/o severe RLS   Follows w/ Sleep Medicine   Tita Scheuermann CNP with [facility] Maintenance:      Subjective:     Review of Systems   Constitutional: Negative. Negative for appetite change, chills, diaphoresis, fatigue, fever and unexpected weight change. HENT: Negative. Eyes: Negative. Respiratory: Negative. Negative for cough, chest tightness, shortness of breath and wheezing. Cardiovascular: Negative. Gastrointestinal: Negative.   Negative for diarrhea, nausea and vomiting. Endocrine: Negative. Genitourinary: Negative. Negative for difficulty urinating, dysuria, frequency and urgency. Skin: Negative. Negative for color change, pallor, rash and wound. Allergic/Immunologic: Negative. Neurological: Negative. Negative for syncope, facial asymmetry and numbness. Hematological: Negative. Psychiatric/Behavioral:  Positive for dysphoric mood. Negative for hallucinations, self-injury and suicidal ideas. The patient is not nervous/anxious and is not hyperactive. Objective:     Vitals:    01/05/23 0940   BP: 110/74   Pulse: 83   Temp: 97.4 °F (36.3 °C)   SpO2: 94%       Body mass index is 32.58 kg/m². Physical Exam  Constitutional:       General: She is not in acute distress. Appearance: Normal appearance. She is well-developed. She is obese. She is not ill-appearing, toxic-appearing or diaphoretic. HENT:      Head: Normocephalic and atraumatic. Right Ear: External ear normal.      Left Ear: External ear normal.      Nose: Nose normal.   Eyes:      General: No scleral icterus. Right eye: No discharge. Left eye: No discharge. Conjunctiva/sclera: Conjunctivae normal.      Pupils: Pupils are equal, round, and reactive to light. Neck:      Trachea: No tracheal deviation. Cardiovascular:      Rate and Rhythm: Normal rate and regular rhythm. Heart sounds: Normal heart sounds. No murmur heard. No friction rub. No gallop. Pulmonary:      Effort: Pulmonary effort is normal. No tachypnea, accessory muscle usage or respiratory distress. Breath sounds: Normal breath sounds. No stridor. No decreased breath sounds, wheezing, rhonchi or rales. Abdominal:      Palpations: Abdomen is soft. Musculoskeletal:         General: No tenderness or deformity. Normal range of motion. Cervical back: Normal range of motion and neck supple. Skin:     General: Skin is warm and dry.       Coloration: Skin is not pale. Findings: No erythema or rash. Neurological:      Mental Status: She is alert and oriented to person, place, and time. GCS: GCS eye subscore is 4. GCS verbal subscore is 5. GCS motor subscore is 6. Gait: Gait is intact. Gait normal.   Psychiatric:         Speech: Speech normal.         Behavior: Behavior normal.         Thought Content: Thought content normal.         Judgment: Judgment normal.         Assessment:         1. Mood disorder [facility]    2. Positive depression screening    3. Paroxysmal atrial fibrillation (HCC)    4. MIRELLA (obstructive sleep apnea)    5. RLS (restless legs syndrome)    6. Atrophic vaginitis        Plan:     1. Mood disorder ([facility]    - [address] [place] [address] ([facility])  - buPROPion (WELLBUTRIN XL) 300 MG extended release tablet; Take 1 tablet by mouth every morning  Dispense: 90 tablet; Refill: 2    Doing well on Wellbutrin, declines dose adjustment  Counseling encouraged    PHQ-9 score today: (PHQ-9 Total Score: 10), additional evaluation and assessment performed, follow-up plan includes but not limited to: Medication management and Referral to /Specialist  for evaluation and management. 2. Positive depression screening    - [address] [place] [address] [facility]  - buPROPion (WELLBUTRIN XL) 300 MG extended release tablet; Take 1 tablet by mouth every morning  Dispense: 90 tablet; Refill: 2    On the basis of positive PHQ-9 screening (PHQ-9 Total Score: 10), the following plan was implemented: additional evaluation and assessment performed, follow-up plan includes but not limited to: Medication management and Referral to BH/Specialist for evaluation and management and referral to Behavioral health provided. Patient will follow-up in 3 week(s) with psychologist.     3. Paroxysmal atrial fibrillation ([facility]    S/p ablation     4. MIRELLA (obstructive sleep apnea)    Follows w/ Sleep Medicine     5.  RLS (restless legs syndrome)    Follows w/ Sleep Medicine & Neurology     6. Atrophic vaginitis    Follows w/ Urology  Encouraged GYN follow-up     Discussed use, benefit, and side effects of prescribed medications. All patient questions answered. Pt voiced understanding. Reviewed health maintenance. Instructed to continue current medications, diet and exercise. Patient agreedwith treatment plan. Follow up as directed.      Electronically signed by DESMOND Duckworth CNP on 1/5/2023

## 2023-01-05 NOTE — PATIENT INSTRUCTIONS
[address] [facility]   [address], [facility], [address] [place] [address]   [phone]

## 2023-02-09 ENCOUNTER — TELEPHONE (OUTPATIENT)
Dept: FAMILY MEDICINE CLINIC | Age: 68
End: 2023-02-09

## 2023-02-09 DIAGNOSIS — Z00.00 HEALTHCARE MAINTENANCE: ICD-10-CM

## 2023-02-09 DIAGNOSIS — N95.2 ATROPHIC VAGINITIS: Primary | ICD-10-CM

## 2023-02-09 NOTE — TELEPHONE ENCOUNTER
Patient needs a referral to either of these gynecologist; Dr. Mejia Gonzalez or Dr. Willow Perez both at the [facility] [phone]. They are both in network for her insurance.     Please advise

## 2023-02-16 ENCOUNTER — OFFICE VISIT (OUTPATIENT)
Dept: NEUROLOGY | Age: 68
End: 2023-02-16
Payer: MEDICARE

## 2023-02-16 VITALS
SYSTOLIC BLOOD PRESSURE: 113 MMHG | HEIGHT: 65 IN | BODY MASS INDEX: 32.92 KG/M2 | DIASTOLIC BLOOD PRESSURE: 71 MMHG | HEART RATE: 81 BPM | WEIGHT: 197.6 LBS

## 2023-02-16 DIAGNOSIS — G25.81 RLS (RESTLESS LEGS SYNDROME): Primary | ICD-10-CM

## 2023-02-16 DIAGNOSIS — G43.009 MIGRAINE WITHOUT AURA AND WITHOUT STATUS MIGRAINOSUS, NOT INTRACTABLE: ICD-10-CM

## 2023-02-16 PROCEDURE — 3017F COLORECTAL CA SCREEN DOC REV: CPT | Performed by: NURSE PRACTITIONER

## 2023-02-16 PROCEDURE — 1036F TOBACCO NON-USER: CPT | Performed by: NURSE PRACTITIONER

## 2023-02-16 PROCEDURE — G8399 PT W/DXA RESULTS DOCUMENT: HCPCS | Performed by: NURSE PRACTITIONER

## 2023-02-16 PROCEDURE — 1090F PRES/ABSN URINE INCON ASSESS: CPT | Performed by: NURSE PRACTITIONER

## 2023-02-16 PROCEDURE — G8484 FLU IMMUNIZE NO ADMIN: HCPCS | Performed by: NURSE PRACTITIONER

## 2023-02-16 PROCEDURE — G8417 CALC BMI ABV UP PARAM F/U: HCPCS | Performed by: NURSE PRACTITIONER

## 2023-02-16 PROCEDURE — G8427 DOCREV CUR MEDS BY ELIG CLIN: HCPCS | Performed by: NURSE PRACTITIONER

## 2023-02-16 PROCEDURE — 1123F ACP DISCUSS/DSCN MKR DOCD: CPT | Performed by: NURSE PRACTITIONER

## 2023-02-16 PROCEDURE — 99214 OFFICE O/P EST MOD 30 MIN: CPT | Performed by: NURSE PRACTITIONER

## 2023-02-16 NOTE — PROGRESS NOTES
[facility]            Glenn Gonzalez. [address]          [place], [address] [facility]          Dept: [phone]          Dept Fax: [phone]    MD Juan José Mccain MD Arma Bottom, MD Dell Mohair, CNP            2/16/2023      HISTORY OF PRESENT ILLNESS:       I had the pleasure of seeing Sapna Medina, who returns for continuing neurologic care. The patient was seen last on August 30, 2022 for treatment of chronic migraine headaches and comorbid restless leg syndrome with sleep apnea. For migraine prophylaxis she was prescribed verapamil 120 mg nightly. For abortive therapy she was prescribed fioricet. She reports compliance to verapamil and notes her headaches have remained infrequent. When she has a headache she will typically lie down in a dark room which eases her headaches. She has continued having approximately 5 headaches/month. Headache location: right orbital   Headache quality: throbbing  Associated factors:  Nausea, photophobia, phonophobia   Intensity: 7/10  Headache chronicity:   Headache frequency: 5/month  Aggravating factors : bright lights  Relieving factors: partially by sleep   Prophylactic medications: verapamil   Abortive medications: fioricet  Previously used medications: topamax, maxalt    She also has comorbid restless leg syndrome with sleep apnea and follow with pulmonology who prescribed requip and CPAP. Testing reviewed:    MRI Brain (w/wo) (10/10/19): No acute infarct, intracranial hemorrhage or significant mass-effect. No evidence of abnormal intracranial enhancement. Mild amount of chronic small vessel ischemic white matter disease. Mild diffuse T1 hypointensity involving clivus and upper cervical spine.        PAST MEDICAL HISTORY:         Diagnosis Date    A-fib [facility])     Anxiety     Atrial fibrillation ([facility] [address] 08/21/2018    Basal cell carcinoma of skin 10/5/2022    Degenerative disc disease, lumbar 2018    Depression     Frequent UTI     Infertility, female     Migraine without aura and without status migrainosus, not intractable 2022    MIRELLA (obstructive sleep apnea) 2022    Osteoarthritis     Restless leg syndrome     Squamous cell carcinoma in situ     Thyroid nodule, Rt. Lobe.  27 mm     Urge incontinence 2020        PAST SURGICAL HISTORY:         Procedure Laterality Date    BREAST BIOPSY      CATARACT REMOVAL  2007    right    COLONOSCOPY N/A 2021    COLONOSCOPY DIAGNOSTIC WITH BIOPSY performed by Kyung Holcomb MD at [facility]      \" as a child\"    EYE SURGERY  2013    cateract removal    TONSILLECTOMY Bilateral 1963    TONSILLECTOMY AND ADENOIDECTOMY      UPPER GASTROINTESTINAL ENDOSCOPY N/A 2021    EGD ESOPHAGOGASTRODUODENOSCOPY performed by Kyung Holcomb MD at [address] [place] [address]:     Social History     Socioeconomic History    Marital status: Single     Spouse name: Not on file    Number of children: Not on file    Years of education: 13    Highest education level: Not on file   Occupational History    Occupation: [id number] [employer]     Employer: [employer]   Tobacco Use    Smoking status: Former     Packs/day: 0.50     Years: 35.00     Pack years: 17.50     Types: Cigarettes     Quit date: 2021     Years since quittin.7     Passive exposure: Never    Smokeless tobacco: Never   Vaping Use    Vaping Use: Former   Substance and Sexual Activity    Alcohol use: No     Comment: rarely    Drug use: No    Sexual activity: Never     Partners: Male   Other Topics Concern    Not on file   Social History Narrative    ** Merged History Encounter **          Social Determinants of Health     Financial Resource Strain: Low Risk     Difficulty of Paying Living Expenses: Not hard at all   Food Insecurity: No Food Insecurity    Worried About [address] in the Last Year: Never true    [facility] in the Last Year: Never true   Transportation Needs: Not on file   Physical Activity: Not on file   Stress: Not on file   Social Connections: Not on file   Intimate Partner Violence: Not on file   Housing Stability: Not on file       CURRENT MEDICATIONS:     Current Outpatient Medications   Medication Sig Dispense Refill    buPROPion (WELLBUTRIN XL) 300 MG extended release tablet Take 1 tablet by mouth every morning 90 tablet 2    estradiol (ESTRACE) 0.1 MG/GM vaginal cream Twice/ week      butalbital-acetaminophen-caffeine (FIORICET, ESGIC) -40 MG per tablet Take one tab every other day as needed for severe headache 40 tablet 3    verapamil (CALAN SR) 120 MG extended release tablet Take 1 tablet by mouth daily 90 tablet 3    temazepam (RESTORIL) 15 MG capsule   2    LYRICA 200 MG capsule Take 2 capsules by mouth daily. 5    rOPINIRole (REQUIP) 1 MG tablet Take 1 tablet by mouth 4 times daily 90 tablet 3    Multiple Vitamin (MULTIVITAMIN) tablet Take 1 tablet by mouth daily 30 tablet 3     No current facility-administered medications for this visit. ALLERGIES:     Allergies   Allergen Reactions    Pcn [Penicillins] Other (See Comments)     \" as a child\"      Neomycin Rash    Penicillin G Rash                                 REVIEW OF SYSTEMS        All items selected indicate a positive finding. Those items not selected are negative.   Constitutional [] Weight loss/gain   [] Fatigue  [] Fever/Chills   HEENT [] Hearing Loss  [] Visual Disturbance  [] Tinnitus  [] Eye pain   Respiratory [] Shortness of Breath  [] Cough  [] Snoring   Cardiovascular [] Chest Pain  [] Palpitations  [] Lightheaded   GI [] Constipation  [] Diarrhea  [] Swallowing change  [x] Nausea/vomiting    [] Urinary Frequency  [] Urinary Urgency   Musculoskeletal [] Neck pain  [] Back pain  [] Muscle pain  [x] Restless legs   Dermatologic [] Skin changes   Neurologic [] Memory loss/confusion  [] Seizures  [] Trouble walking or imbalance  [] Dizziness  [] Sleep disturbance  [] Weakness  [] Numbness  [] Tremors  [] Speech Difficulty  [x] Headaches  [x] Light Sensitivity  [x] Sound Sensitivity   Endocrinology []Excessive thirst  []Excessive hunger   Psychiatric [] Anxiety/Depression  [] Hallucination   Allergy/immunology []Hives/environmental allergies   Hematologic/lymph [] Abnormal bleeding  [] Abnormal bruising         PHYSICAL EXAMINATION:       Vitals:    02/16/23 1121   BP: 113/71   Pulse: 81                                              .                                                                                                    General Appearance:  Alert, cooperative, no signs of distress, appears stated age   Head:  Normocephalic, no signs of trauma   Eyes:  Conjunctiva/corneas clear;  eyelids intact   Ears:  Normal external ear and canals   Nose: Nares normal, mucosa normal, no drainage    Throat: Lips and tongue normal; teeth normal;  gums normal   Neck: Supple, intact flexion, extension and rotation;   trachea midline;  no adenopathy;   thyroid: not enlarged;   no carotid pulse abnormality   Back:   Symmetric, no curvature, ROM adequate   Lungs:   Respirations unlabored   Heart:  Regular rate and rhythm           Extremities: Extremities normal, no cyanosis, no edema   Pulses: Symmetric over head and neck   Skin: Skin color, texture normal, no rashes, no lesions                                     NEUROLOGIC EXAMINATION    Neurologic Exam  Mental status    Alert and oriented x 3; intact memory with no confusion, speech or language problems; no hallucinations or delusions  Fund of information appropriate for level of education    Cranial nerves    II - visual fields intact to confrontation bilaterally  III, IV, VI - extra-ocular muscles full: no pupillary defect; no LEONARD, no nystagmus, no ptosis   V - normal facial sensation                                                               VII - normal facial symmetry VIII - intact hearing                                                                             IX, X - symmetrical palate                                                                  XI - symmetrical shoulder shrug                                                       XII - tongue midline without atrophy or fasciculation      Motor function  Normal muscle bulk and tone; strength 5/5 on all 4 extremities, no pronator drift      Sensory function Intact to light touch, pinprick, vibration, proprioception on all 4 extremities      Cerebellar Intact fine motor movement. No involuntary movements or tremors. No ataxia or dysmetria on finger to nose or heel to shin testing      Reflex function DTR 2+ on bilateral UE and LE, symmetric. Down going toes bilaterally      Gait                   normal base and arm swing                  Medical Decision Making: In summary, your patient, Alirio Mccann exhibits the following, with associated plan:    Chronic migraine headaches, which have recently improved to 5 migraine headaches/month. She recently received her CPAP machine which has been effective in improving her sleep and also may contribute to improvements in her headache frequency.    Continue verapamil 120 mg at bedtime daily  Continue fioricet for rescue  Comorbid restless leg syndrome with sleep apnea  Continue to follow with pulmonology who prescribe requip and CPAP  Return for follow up visit on annual basis or sooner if her headaches begin to worsen             Signed: Tawanda Birmingham CNP      *Please note that portions of this note were completed with a voice recognition program.  Although every effort was made to insure the accuracy of this automated transcription, some errors in transcription may have occurred, occasionally words and are mis-transcribed    Provider Attestation: The documentation recorded by the scribe accurately reflects the service I personally performed and the decisions made by myself. Portions of this note were transcribed by a scribe. I personally performed the history, physical exam, and medical decision-making and confirm the accuracy of the information in the transcribed note. Scribe Attestation:   By signing my name below, Nirmala Blank, attest that this documentation has been prepared under the direction and in the presence of Jeannine Butts CNP.

## 2023-02-22 ENCOUNTER — TELEPHONE (OUTPATIENT)
Dept: NEUROLOGY | Age: 68
End: 2023-02-22

## 2023-02-22 NOTE — TELEPHONE ENCOUNTER
Marquita Melvin called the office stating that she has been summoned for [employer] duty. She would like to know if Junious Post would be willing to write a letter excusing her. She stated that she is seen for migraines. If a letter can be done she would like it faxed to [facility] court at [phone].

## 2023-02-23 NOTE — TELEPHONE ENCOUNTER
[facility] called the office back and I relayed the previously stated information to her. She verbalized understanding.

## 2023-06-02 ENCOUNTER — OFFICE VISIT (OUTPATIENT)
Dept: BEHAVIORAL/MENTAL HEALTH CLINIC | Age: 68
End: 2023-06-02
Payer: MEDICARE

## 2023-06-02 DIAGNOSIS — F33.0 MAJOR DEPRESSIVE DISORDER, RECURRENT EPISODE, MILD WITH ANXIOUS DISTRESS (HCC): Primary | ICD-10-CM

## 2023-06-02 PROCEDURE — 1123F ACP DISCUSS/DSCN MKR DOCD: CPT | Performed by: PSYCHOLOGIST

## 2023-06-02 PROCEDURE — 90837 PSYTX W PT 60 MINUTES: CPT | Performed by: PSYCHOLOGIST

## 2023-06-02 PROCEDURE — 1036F TOBACCO NON-USER: CPT | Performed by: PSYCHOLOGIST

## 2023-06-20 ENCOUNTER — OFFICE VISIT (OUTPATIENT)
Dept: BEHAVIORAL/MENTAL HEALTH CLINIC | Age: 68
End: 2023-06-20
Payer: MEDICARE

## 2023-06-20 DIAGNOSIS — F33.0 MAJOR DEPRESSIVE DISORDER, RECURRENT EPISODE, MILD WITH ANXIOUS DISTRESS (HCC): Primary | ICD-10-CM

## 2023-06-20 PROCEDURE — 1123F ACP DISCUSS/DSCN MKR DOCD: CPT | Performed by: PSYCHOLOGIST

## 2023-06-20 PROCEDURE — 90837 PSYTX W PT 60 MINUTES: CPT | Performed by: PSYCHOLOGIST

## 2023-06-20 PROCEDURE — 1036F TOBACCO NON-USER: CPT | Performed by: PSYCHOLOGIST

## 2023-07-31 ENCOUNTER — HOSPITAL ENCOUNTER (OUTPATIENT)
Dept: MAMMOGRAPHY | Age: 68
Discharge: HOME | End: 2023-08-02
Payer: MEDICARE

## 2023-07-31 VITALS — BODY MASS INDEX: 32.14 KG/M2 | HEIGHT: 66 IN | WEIGHT: 200 LBS

## 2023-07-31 DIAGNOSIS — Z12.31 VISIT FOR SCREENING MAMMOGRAM: ICD-10-CM

## 2023-07-31 PROCEDURE — 77063 BREAST TOMOSYNTHESIS BI: CPT

## 2023-09-06 ENCOUNTER — TELEPHONE (OUTPATIENT)
Dept: ONCOLOGY | Age: 68
End: 2023-09-06

## 2023-09-06 DIAGNOSIS — Z87.891 PERSONAL HISTORY OF NICOTINE DEPENDENCE: Primary | ICD-10-CM

## 2023-09-06 NOTE — TELEPHONE ENCOUNTER
Our records indicate that your patient is coming due for their annual lung cancer screening follow up testing. For your convenience, we have pended the order for the scan for you. If you do not agree with the need for the test, please cancel the order and let us know. Sincerely,    [address] Screening Program    Auto printed reminder letter sent to patient.

## 2023-10-03 ENCOUNTER — TELEPHONE (OUTPATIENT)
Dept: FAMILY MEDICINE CLINIC | Age: 68
End: 2023-10-03

## 2023-10-18 ENCOUNTER — TELEPHONE (OUTPATIENT)
Dept: FAMILY MEDICINE CLINIC | Age: 68
End: 2023-10-18

## 2023-11-09 ENCOUNTER — TELEPHONE (OUTPATIENT)
Dept: FAMILY MEDICINE CLINIC | Age: 68
End: 2023-11-09

## 2023-11-09 NOTE — TELEPHONE ENCOUNTER
Patient does not want to be contacted regarding AWV anymore please.  She does not have transportation to come to the office

## 2023-11-09 NOTE — TELEPHONE ENCOUNTER
Shani Contreras was contacted as a part of [employer]. A voicemail message was left reminding the patient to schedule an AWV with their PCP.

## 2023-11-22 DIAGNOSIS — G43.019 INTRACTABLE MIGRAINE WITHOUT AURA AND WITHOUT STATUS MIGRAINOSUS: ICD-10-CM

## 2023-11-22 NOTE — TELEPHONE ENCOUNTER
Pharmacy requesting refill of Verapamil.       Medication active on med list yes      Date of last fill: 8/30/22  verified on 11/22/2023   verified by [facility] LPN      Date of last appointment 2/16/23    Next Visit Date:  2/6/2024

## 2023-12-17 DIAGNOSIS — F39 MOOD DISORDER (HCC): ICD-10-CM

## 2023-12-17 DIAGNOSIS — Z13.31 POSITIVE DEPRESSION SCREENING: ICD-10-CM

## 2023-12-18 RX ORDER — BUPROPION HYDROCHLORIDE 300 MG/1
300 TABLET ORAL EVERY MORNING
Qty: 30 TABLET | Refills: 0 | Status: SHIPPED | OUTPATIENT
Start: 2023-12-18 | End: 2024-01-15

## 2023-12-18 NOTE — TELEPHONE ENCOUNTER
Letter sent to patient for appointment    Mar Arora is calling to request a refill on the following medication(s):    Medication Request:  Requested Prescriptions     Pending Prescriptions Disp Refills    buPROPion (WELLBUTRIN XL) 300 MG extended release tablet [Pharmacy Med Name: BUPROPION HCL  MG TABLET] 90 tablet 2     Sig: take 1 tablet by mouth every morning       Last Visit Date (If Applicable):  1/5/2023    Next Visit Date:    Visit date not found

## 2024-01-14 DIAGNOSIS — F39 MOOD DISORDER (HCC): ICD-10-CM

## 2024-01-14 DIAGNOSIS — Z13.31 POSITIVE DEPRESSION SCREENING: ICD-10-CM

## 2024-01-15 RX ORDER — BUPROPION HYDROCHLORIDE 300 MG/1
300 TABLET ORAL EVERY MORNING
Qty: 7 TABLET | Refills: 0 | Status: SHIPPED | OUTPATIENT
Start: 2024-01-15

## 2024-01-15 NOTE — TELEPHONE ENCOUNTER
I left a message on patient's recorder to call back to schedule an appointment.   MiraKat Arora is calling to request a refill on the following medication(s):    Medication Request:  Requested Prescriptions     Pending Prescriptions Disp Refills    buPROPion (WELLBUTRIN XL) 300 MG extended release tablet [Pharmacy Med Name: BUPROPION HCL  MG TABLET] 30 tablet 0     Sig: take 1 tablet by mouth every morning       Last Visit Date (If Applicable):  1/5/2023    Next Visit Date:    Visit date not found

## 2024-01-18 VITALS — WEIGHT: 201 LBS | HEIGHT: 65 IN | BODY MASS INDEX: 33.49 KG/M2

## 2024-01-22 DIAGNOSIS — Z13.31 POSITIVE DEPRESSION SCREENING: ICD-10-CM

## 2024-01-22 DIAGNOSIS — F39 MOOD DISORDER (HCC): ICD-10-CM

## 2024-01-22 RX ORDER — BUPROPION HYDROCHLORIDE 300 MG/1
TABLET ORAL
Qty: 7 TABLET | Refills: 0 | OUTPATIENT
Start: 2024-01-22

## 2024-01-26 ENCOUNTER — HOSPITAL ENCOUNTER (OUTPATIENT)
Dept: CT IMAGING | Age: 69
End: 2024-01-26
Attending: INTERNAL MEDICINE
Payer: MEDICARE

## 2024-01-26 DIAGNOSIS — Z87.891 PERSONAL HISTORY OF TOBACCO USE, PRESENTING HAZARDS TO HEALTH: ICD-10-CM

## 2024-01-26 DIAGNOSIS — F17.211 CIGARETTE NICOTINE DEPENDENCE IN REMISSION: ICD-10-CM

## 2024-01-26 PROCEDURE — 71271 CT THORAX LUNG CANCER SCR C-: CPT

## 2024-02-06 ENCOUNTER — OFFICE VISIT (OUTPATIENT)
Dept: NEUROLOGY | Age: 69
End: 2024-02-06
Payer: MEDICARE

## 2024-02-06 VITALS
WEIGHT: 202 LBS | BODY MASS INDEX: 33.66 KG/M2 | SYSTOLIC BLOOD PRESSURE: 117 MMHG | DIASTOLIC BLOOD PRESSURE: 84 MMHG | HEIGHT: 65 IN | HEART RATE: 71 BPM

## 2024-02-06 DIAGNOSIS — G43.019 INTRACTABLE MIGRAINE WITHOUT AURA AND WITHOUT STATUS MIGRAINOSUS: ICD-10-CM

## 2024-02-06 PROCEDURE — G8399 PT W/DXA RESULTS DOCUMENT: HCPCS | Performed by: STUDENT IN AN ORGANIZED HEALTH CARE EDUCATION/TRAINING PROGRAM

## 2024-02-06 PROCEDURE — G8427 DOCREV CUR MEDS BY ELIG CLIN: HCPCS | Performed by: STUDENT IN AN ORGANIZED HEALTH CARE EDUCATION/TRAINING PROGRAM

## 2024-02-06 PROCEDURE — G8484 FLU IMMUNIZE NO ADMIN: HCPCS | Performed by: STUDENT IN AN ORGANIZED HEALTH CARE EDUCATION/TRAINING PROGRAM

## 2024-02-06 PROCEDURE — 3017F COLORECTAL CA SCREEN DOC REV: CPT | Performed by: STUDENT IN AN ORGANIZED HEALTH CARE EDUCATION/TRAINING PROGRAM

## 2024-02-06 PROCEDURE — 1036F TOBACCO NON-USER: CPT | Performed by: STUDENT IN AN ORGANIZED HEALTH CARE EDUCATION/TRAINING PROGRAM

## 2024-02-06 PROCEDURE — 1123F ACP DISCUSS/DSCN MKR DOCD: CPT | Performed by: STUDENT IN AN ORGANIZED HEALTH CARE EDUCATION/TRAINING PROGRAM

## 2024-02-06 PROCEDURE — 99215 OFFICE O/P EST HI 40 MIN: CPT | Performed by: STUDENT IN AN ORGANIZED HEALTH CARE EDUCATION/TRAINING PROGRAM

## 2024-02-06 PROCEDURE — G8417 CALC BMI ABV UP PARAM F/U: HCPCS | Performed by: STUDENT IN AN ORGANIZED HEALTH CARE EDUCATION/TRAINING PROGRAM

## 2024-02-06 PROCEDURE — 1090F PRES/ABSN URINE INCON ASSESS: CPT | Performed by: STUDENT IN AN ORGANIZED HEALTH CARE EDUCATION/TRAINING PROGRAM

## 2024-02-06 RX ORDER — RIZATRIPTAN BENZOATE 10 MG/1
10 TABLET ORAL PRN
Qty: 9 TABLET | Refills: 5 | Status: SHIPPED | OUTPATIENT
Start: 2024-02-06

## 2024-02-06 NOTE — PROGRESS NOTES
examination, counseling and educating the patient/family/caregiver and ordering medications, tests, or procedures.        Leola Vasquez MD   Neurology & Sleep Medicine  [facility]

## 2024-02-18 DIAGNOSIS — G43.019 INTRACTABLE MIGRAINE WITHOUT AURA AND WITHOUT STATUS MIGRAINOSUS: ICD-10-CM

## 2024-02-19 ENCOUNTER — OFFICE VISIT (OUTPATIENT)
Dept: FAMILY MEDICINE CLINIC | Age: 69
End: 2024-02-19
Payer: MEDICARE

## 2024-02-19 VITALS
SYSTOLIC BLOOD PRESSURE: 118 MMHG | DIASTOLIC BLOOD PRESSURE: 80 MMHG | TEMPERATURE: 97.1 F | HEART RATE: 80 BPM | OXYGEN SATURATION: 97 % | BODY MASS INDEX: 33.32 KG/M2 | WEIGHT: 200.2 LBS

## 2024-02-19 DIAGNOSIS — Z86.79 S/P ABLATION OF ATRIAL FIBRILLATION: ICD-10-CM

## 2024-02-19 DIAGNOSIS — Z98.890 S/P THYROID BIOPSY: ICD-10-CM

## 2024-02-19 DIAGNOSIS — E04.1 THYROID NODULE: ICD-10-CM

## 2024-02-19 DIAGNOSIS — Z98.890 S/P ABLATION OF ATRIAL FIBRILLATION: ICD-10-CM

## 2024-02-19 DIAGNOSIS — F33.41 RECURRENT MAJOR DEPRESSIVE DISORDER, IN PARTIAL REMISSION (HCC): Primary | ICD-10-CM

## 2024-02-19 DIAGNOSIS — I48.0 PAROXYSMAL ATRIAL FIBRILLATION (HCC): ICD-10-CM

## 2024-02-19 PROCEDURE — G8399 PT W/DXA RESULTS DOCUMENT: HCPCS | Performed by: NURSE PRACTITIONER

## 2024-02-19 PROCEDURE — 1123F ACP DISCUSS/DSCN MKR DOCD: CPT | Performed by: NURSE PRACTITIONER

## 2024-02-19 PROCEDURE — 99214 OFFICE O/P EST MOD 30 MIN: CPT | Performed by: NURSE PRACTITIONER

## 2024-02-19 PROCEDURE — G8417 CALC BMI ABV UP PARAM F/U: HCPCS | Performed by: NURSE PRACTITIONER

## 2024-02-19 PROCEDURE — 3017F COLORECTAL CA SCREEN DOC REV: CPT | Performed by: NURSE PRACTITIONER

## 2024-02-19 PROCEDURE — G8427 DOCREV CUR MEDS BY ELIG CLIN: HCPCS | Performed by: NURSE PRACTITIONER

## 2024-02-19 PROCEDURE — 1036F TOBACCO NON-USER: CPT | Performed by: NURSE PRACTITIONER

## 2024-02-19 PROCEDURE — G8484 FLU IMMUNIZE NO ADMIN: HCPCS | Performed by: NURSE PRACTITIONER

## 2024-02-19 PROCEDURE — 1090F PRES/ABSN URINE INCON ASSESS: CPT | Performed by: NURSE PRACTITIONER

## 2024-02-19 RX ORDER — BUPROPION HYDROCHLORIDE 300 MG/1
300 TABLET ORAL EVERY MORNING
Qty: 90 TABLET | Refills: 3 | Status: SHIPPED | OUTPATIENT
Start: 2024-02-19

## 2024-02-19 SDOH — ECONOMIC STABILITY: INCOME INSECURITY: HOW HARD IS IT FOR YOU TO PAY FOR THE VERY BASICS LIKE FOOD, HOUSING, MEDICAL CARE, AND HEATING?: NOT HARD AT ALL

## 2024-02-19 SDOH — ECONOMIC STABILITY: FOOD INSECURITY: WITHIN THE PAST 12 MONTHS, YOU WORRIED THAT YOUR FOOD WOULD RUN OUT BEFORE YOU GOT MONEY TO BUY MORE.: NEVER TRUE

## 2024-02-19 SDOH — ECONOMIC STABILITY: FOOD INSECURITY: WITHIN THE PAST 12 MONTHS, THE FOOD YOU BOUGHT JUST DIDN'T LAST AND YOU DIDN'T HAVE MONEY TO GET MORE.: NEVER TRUE

## 2024-02-19 SDOH — ECONOMIC STABILITY: HOUSING INSECURITY
IN THE LAST 12 MONTHS, WAS THERE A TIME WHEN YOU DID NOT HAVE A STEADY PLACE TO SLEEP OR SLEPT IN A SHELTER (INCLUDING NOW)?: NO

## 2024-02-19 ASSESSMENT — ENCOUNTER SYMPTOMS
COUGH: 0
GASTROINTESTINAL NEGATIVE: 1
RESPIRATORY NEGATIVE: 1
EYES NEGATIVE: 1
NAUSEA: 0
SHORTNESS OF BREATH: 0
COLOR CHANGE: 0
ALLERGIC/IMMUNOLOGIC NEGATIVE: 1
VOMITING: 0
CHEST TIGHTNESS: 0
DIARRHEA: 0

## 2024-02-19 ASSESSMENT — PATIENT HEALTH QUESTIONNAIRE - PHQ9
10. IF YOU CHECKED OFF ANY PROBLEMS, HOW DIFFICULT HAVE THESE PROBLEMS MADE IT FOR YOU TO DO YOUR WORK, TAKE CARE OF THINGS AT HOME, OR GET ALONG WITH OTHER PEOPLE: 1
1. LITTLE INTEREST OR PLEASURE IN DOING THINGS: 1
SUM OF ALL RESPONSES TO PHQ9 QUESTIONS 1 & 2: 2
SUM OF ALL RESPONSES TO PHQ QUESTIONS 1-9: 6
2. FEELING DOWN, DEPRESSED OR HOPELESS: 1
6. FEELING BAD ABOUT YOURSELF - OR THAT YOU ARE A FAILURE OR HAVE LET YOURSELF OR YOUR FAMILY DOWN: 0
SUM OF ALL RESPONSES TO PHQ QUESTIONS 1-9: 6
4. FEELING TIRED OR HAVING LITTLE ENERGY: 3
3. TROUBLE FALLING OR STAYING ASLEEP: 1
SUM OF ALL RESPONSES TO PHQ QUESTIONS 1-9: 6
5. POOR APPETITE OR OVEREATING: 0
8. MOVING OR SPEAKING SO SLOWLY THAT OTHER PEOPLE COULD HAVE NOTICED. OR THE OPPOSITE, BEING SO FIGETY OR RESTLESS THAT YOU HAVE BEEN MOVING AROUND A LOT MORE THAN USUAL: 0
SUM OF ALL RESPONSES TO PHQ QUESTIONS 1-9: 6
7. TROUBLE CONCENTRATING ON THINGS, SUCH AS READING THE NEWSPAPER OR WATCHING TELEVISION: 0
9. THOUGHTS THAT YOU WOULD BE BETTER OFF DEAD, OR OF HURTING YOURSELF: 0

## 2024-02-19 NOTE — PROGRESS NOTES
[facility]  [address] [place] [address]  Dept: [phone]    Mar Arora is a 69 y.o. female who presents today for her medical conditions/complaintsas noted below.      Mar Arora is here today c/o Follow-up Chronic Condition and Depression    Past Medical History:   Diagnosis Date    A-fib (HCC)     Anxiety     Atrial fibrillation (HCC) 2018    Basal cell carcinoma of skin 10/5/2022    Degenerative disc disease, lumbar 2018    Depression     Frequent UTI     Infertility, female     Migraine without aura and without status migrainosus, not intractable 2022    IMRELLA (obstructive sleep apnea) 2022    Osteoarthritis     Restless leg syndrome     S/P thyroid biopsy 2024    Squamous cell carcinoma in situ     Thyroid nodule, Rt. Lobe. 27 mm     Urge incontinence 2020      Past Surgical History:   Procedure Laterality Date    BREAST BIOPSY      CATARACT REMOVAL  2007    right    COLONOSCOPY N/A 2021    COLONOSCOPY DIAGNOSTIC WITH BIOPSY performed by Esteban Hayes MD at [facility] OR    DILATION AND CURETTAGE      EYE SURGERY      \" as a child\"    EYE SURGERY  2013    cateract removal    TONSILLECTOMY Bilateral 1963    TONSILLECTOMY AND ADENOIDECTOMY      UPPER GASTROINTESTINAL ENDOSCOPY N/A 2021    EGD ESOPHAGOGASTRODUODENOSCOPY performed by Esteban Hayes MD at [facility] OR       Family History   Problem Relation Age of Onset    Cancer Mother         pancreatic    Hypertension Mother     Osteoporosis Mother     Cancer Father     Lung Cancer Father     Arthritis Sister     Diabetes Paternal Grandmother     Breast Cancer Paternal Grandmother 80       Social History     Tobacco Use    Smoking status: Former     Current packs/day: 0.00     Average packs/day: 1 pack/day for 21.0 years (20.8 ttl pk-yrs)     Types: Cigarettes     Start date: 1986     Quit date: 2022     Years since quittin.1     Passive exposure: Never

## 2024-04-08 DIAGNOSIS — G43.019 INTRACTABLE MIGRAINE WITHOUT AURA AND WITHOUT STATUS MIGRAINOSUS: ICD-10-CM

## 2024-04-08 NOTE — TELEPHONE ENCOUNTER
Patient requesting 90 day supply    Pharmacy requesting refill of verapamil 180 mg.      Medication active on med list yes      Date of last Rx: 2/6/24 with 2 refills          verified by PEDRO KHAN      Date of last appointment 2/6/24    Next Visit Date:  5/6/2024

## 2024-05-06 ENCOUNTER — OFFICE VISIT (OUTPATIENT)
Dept: NEUROLOGY | Age: 69
End: 2024-05-06
Payer: MEDICARE

## 2024-05-06 VITALS
SYSTOLIC BLOOD PRESSURE: 114 MMHG | WEIGHT: 202 LBS | DIASTOLIC BLOOD PRESSURE: 74 MMHG | HEIGHT: 65 IN | HEART RATE: 64 BPM | BODY MASS INDEX: 33.66 KG/M2

## 2024-05-06 DIAGNOSIS — G25.81 RLS (RESTLESS LEGS SYNDROME): ICD-10-CM

## 2024-05-06 DIAGNOSIS — G43.019 INTRACTABLE MIGRAINE WITHOUT AURA AND WITHOUT STATUS MIGRAINOSUS: Primary | ICD-10-CM

## 2024-05-06 PROCEDURE — 99214 OFFICE O/P EST MOD 30 MIN: CPT | Performed by: STUDENT IN AN ORGANIZED HEALTH CARE EDUCATION/TRAINING PROGRAM

## 2024-05-06 PROCEDURE — 1123F ACP DISCUSS/DSCN MKR DOCD: CPT | Performed by: STUDENT IN AN ORGANIZED HEALTH CARE EDUCATION/TRAINING PROGRAM

## 2024-05-06 PROCEDURE — G2211 COMPLEX E/M VISIT ADD ON: HCPCS | Performed by: STUDENT IN AN ORGANIZED HEALTH CARE EDUCATION/TRAINING PROGRAM

## 2024-05-06 NOTE — PROGRESS NOTES
rescue  Continue rizatriptan for rescue therapy    Comorbid restless leg syndrome with sleep apnea  Continue to follow with pulmonology who prescribe requip and CPAP  Discussed [employer] therapy with patient and to follow up with her pulmonologist for more information           Leola Vasquez MD   Neurology & Sleep Medicine  [facility]

## 2024-08-03 ENCOUNTER — HOSPITAL ENCOUNTER (OUTPATIENT)
Dept: MAMMOGRAPHY | Age: 69
End: 2024-08-03
Payer: MEDICARE

## 2024-08-03 VITALS — HEIGHT: 65 IN | WEIGHT: 200 LBS | BODY MASS INDEX: 33.32 KG/M2

## 2024-08-03 DIAGNOSIS — Z12.31 VISIT FOR SCREENING MAMMOGRAM: ICD-10-CM

## 2024-08-03 PROCEDURE — 77063 BREAST TOMOSYNTHESIS BI: CPT

## 2024-08-19 ENCOUNTER — OFFICE VISIT (OUTPATIENT)
Dept: FAMILY MEDICINE CLINIC | Age: 69
End: 2024-08-19
Payer: MEDICARE

## 2024-08-19 VITALS
TEMPERATURE: 97.4 F | OXYGEN SATURATION: 92 % | HEART RATE: 80 BPM | BODY MASS INDEX: 34.11 KG/M2 | SYSTOLIC BLOOD PRESSURE: 114 MMHG | DIASTOLIC BLOOD PRESSURE: 76 MMHG | WEIGHT: 205 LBS

## 2024-08-19 DIAGNOSIS — E04.2 MULTIPLE THYROID NODULES: ICD-10-CM

## 2024-08-19 DIAGNOSIS — Z11.59 NEED FOR HEPATITIS C SCREENING TEST: ICD-10-CM

## 2024-08-19 DIAGNOSIS — E78.5 DYSLIPIDEMIA: ICD-10-CM

## 2024-08-19 DIAGNOSIS — R73.01 IFG (IMPAIRED FASTING GLUCOSE): ICD-10-CM

## 2024-08-19 DIAGNOSIS — Z12.11 SCREENING FOR COLON CANCER: ICD-10-CM

## 2024-08-19 DIAGNOSIS — Z86.79 S/P ABLATION OF ATRIAL FIBRILLATION: ICD-10-CM

## 2024-08-19 DIAGNOSIS — Z98.890 S/P ABLATION OF ATRIAL FIBRILLATION: ICD-10-CM

## 2024-08-19 DIAGNOSIS — F33.41 RECURRENT MAJOR DEPRESSIVE DISORDER, IN PARTIAL REMISSION (HCC): Primary | ICD-10-CM

## 2024-08-19 PROCEDURE — 1123F ACP DISCUSS/DSCN MKR DOCD: CPT | Performed by: NURSE PRACTITIONER

## 2024-08-19 PROCEDURE — 99214 OFFICE O/P EST MOD 30 MIN: CPT | Performed by: NURSE PRACTITIONER

## 2024-08-19 ASSESSMENT — PATIENT HEALTH QUESTIONNAIRE - PHQ9
SUM OF ALL RESPONSES TO PHQ QUESTIONS 1-9: 7
2. FEELING DOWN, DEPRESSED OR HOPELESS: SEVERAL DAYS
8. MOVING OR SPEAKING SO SLOWLY THAT OTHER PEOPLE COULD HAVE NOTICED. OR THE OPPOSITE, BEING SO FIGETY OR RESTLESS THAT YOU HAVE BEEN MOVING AROUND A LOT MORE THAN USUAL: NOT AT ALL
3. TROUBLE FALLING OR STAYING ASLEEP: MORE THAN HALF THE DAYS
7. TROUBLE CONCENTRATING ON THINGS, SUCH AS READING THE NEWSPAPER OR WATCHING TELEVISION: NOT AT ALL
5. POOR APPETITE OR OVEREATING: NOT AT ALL
6. FEELING BAD ABOUT YOURSELF - OR THAT YOU ARE A FAILURE OR HAVE LET YOURSELF OR YOUR FAMILY DOWN: NOT AT ALL
4. FEELING TIRED OR HAVING LITTLE ENERGY: MORE THAN HALF THE DAYS
9. THOUGHTS THAT YOU WOULD BE BETTER OFF DEAD, OR OF HURTING YOURSELF: NOT AT ALL
1. LITTLE INTEREST OR PLEASURE IN DOING THINGS: MORE THAN HALF THE DAYS
SUM OF ALL RESPONSES TO PHQ QUESTIONS 1-9: 7
SUM OF ALL RESPONSES TO PHQ9 QUESTIONS 1 & 2: 3
10. IF YOU CHECKED OFF ANY PROBLEMS, HOW DIFFICULT HAVE THESE PROBLEMS MADE IT FOR YOU TO DO YOUR WORK, TAKE CARE OF THINGS AT HOME, OR GET ALONG WITH OTHER PEOPLE: SOMEWHAT DIFFICULT

## 2024-08-19 ASSESSMENT — ENCOUNTER SYMPTOMS
CHEST TIGHTNESS: 0
NAUSEA: 0
ALLERGIC/IMMUNOLOGIC NEGATIVE: 1
GASTROINTESTINAL NEGATIVE: 1
COLOR CHANGE: 0
VOMITING: 0
SHORTNESS OF BREATH: 0
RESPIRATORY NEGATIVE: 1
EYES NEGATIVE: 1
DIARRHEA: 0
COUGH: 0

## 2024-08-19 NOTE — PROGRESS NOTES
[facility]  [address] [place] [address]  Dept: [phone]    Mar Arora is a 69 y.o. female who presents today for her medical conditions/complaintsas noted below.      Mar Arora is here today c/o Follow-up Chronic Condition and Discuss Medications    Past Medical History:   Diagnosis Date    A-fib (HCC)     Anxiety     Atrial fibrillation (HCC) 2018    Basal cell carcinoma of skin 10/5/2022    Degenerative disc disease, lumbar 2018    Depression     Frequent UTI     Infertility, female     Migraine without aura and without status migrainosus, not intractable 2022    MIRELLA (obstructive sleep apnea) 2022    Osteoarthritis     Restless leg syndrome     S/P thyroid biopsy 2024    Squamous cell carcinoma in situ     Thyroid nodule, Rt. Lobe. 27 mm     Urge incontinence 2020      Past Surgical History:   Procedure Laterality Date    BREAST BIOPSY      CATARACT REMOVAL  2007    right    COLONOSCOPY N/A 2021    COLONOSCOPY DIAGNOSTIC WITH BIOPSY performed by Esteban Hayes MD at [facility] OR    DILATION AND CURETTAGE      EYE SURGERY      \" as a child\"    EYE SURGERY  2013    cateract removal    TONSILLECTOMY Bilateral 1963    TONSILLECTOMY AND ADENOIDECTOMY      UPPER GASTROINTESTINAL ENDOSCOPY N/A 2021    EGD ESOPHAGOGASTRODUODENOSCOPY performed by Esteban Hayes MD at [facility] OR       Family History   Problem Relation Age of Onset    Cancer Mother         pancreatic    Hypertension Mother     Osteoporosis Mother     Cancer Father     Lung Cancer Father     Arthritis Sister     Diabetes Paternal Grandmother     Breast Cancer Paternal Grandmother 80       Social History     Tobacco Use    Smoking status: Former     Current packs/day: 0.00     Average packs/day: 1 pack/day for 21.0 years (20.8 ttl pk-yrs)     Types: Cigarettes     Start date: 1986     Quit date: 2022     Years since quittin.6     Passive exposure:

## 2024-09-05 ENCOUNTER — OFFICE VISIT (OUTPATIENT)
Dept: FAMILY MEDICINE CLINIC | Age: 69
End: 2024-09-05
Payer: MEDICARE

## 2024-09-05 VITALS
BODY MASS INDEX: 32.85 KG/M2 | DIASTOLIC BLOOD PRESSURE: 82 MMHG | WEIGHT: 204.4 LBS | HEART RATE: 85 BPM | TEMPERATURE: 97.6 F | OXYGEN SATURATION: 98 % | HEIGHT: 66 IN | SYSTOLIC BLOOD PRESSURE: 122 MMHG

## 2024-09-05 DIAGNOSIS — I87.8 PROMINENT VEIN: Primary | ICD-10-CM

## 2024-09-05 PROCEDURE — 99213 OFFICE O/P EST LOW 20 MIN: CPT | Performed by: NURSE PRACTITIONER

## 2024-09-05 PROCEDURE — 1123F ACP DISCUSS/DSCN MKR DOCD: CPT | Performed by: NURSE PRACTITIONER

## 2024-09-05 ASSESSMENT — ENCOUNTER SYMPTOMS
NAUSEA: 0
DIARRHEA: 0
SHORTNESS OF BREATH: 0
COLOR CHANGE: 0
COUGH: 0
GASTROINTESTINAL NEGATIVE: 1
VOMITING: 0
ALLERGIC/IMMUNOLOGIC NEGATIVE: 1
CHEST TIGHTNESS: 0
RESPIRATORY NEGATIVE: 1
EYES NEGATIVE: 1

## 2024-09-05 NOTE — PROGRESS NOTES
exposure: Never    Smokeless tobacco: Never   Substance Use Topics    Alcohol use: No     Comment: rarely      Current Outpatient Medications   Medication Sig Dispense Refill    verapamil (CALAN SR) 180 MG extended release tablet Take 1 tablet by mouth daily 90 tablet 1    buPROPion (WELLBUTRIN XL) 300 MG extended release tablet Take 1 tablet by mouth every morning 90 tablet 3    Mirabegron (MYRBETRIQ PO) Take by mouth daily      rizatriptan (MAXALT) 10 MG tablet Take 1 tablet by mouth as needed for Migraine May repeat in 2 hours if needed 9 tablet 5    estradiol (ESTRACE) 0.1 MG/GM vaginal cream Twice/ week      butalbital-acetaminophen-caffeine (FIORICET, ESGIC) -40 MG per tablet Take one tab every other day as needed for severe headache 40 tablet 3    temazepam (RESTORIL) 15 MG capsule Take 1 capsule by mouth daily.  2    LYRICA 200 MG capsule Take 2 capsules by mouth daily.  5    rOPINIRole (REQUIP) 1 MG tablet Take 1 tablet by mouth 4 times daily 90 tablet 3    Multiple Vitamin (MULTIVITAMIN) tablet Take 1 tablet by mouth daily 30 tablet 3     No current facility-administered medications for this visit.     Allergies   Allergen Reactions    Pcn [Penicillins] Other (See Comments)     \" as a child\"      Neomycin Rash    Penicillin G Rash         HPI:     HPI    Presents today c/o prominent vein in left arm   Reports frequent plasma donation (once weekly)  She is only able to donate from her left arm  Reports it seems larger than normal  There is no associated pain, swelling, redness, etc.    Health Maintenance:      Subjective:     Review of Systems   Constitutional: Negative.  Negative for appetite change, chills, diaphoresis and fever.   HENT: Negative.     Eyes: Negative.    Respiratory: Negative.  Negative for cough, chest tightness and shortness of breath.    Cardiovascular: Negative.    Gastrointestinal: Negative.  Negative for diarrhea, nausea and vomiting.   Endocrine: Negative.    Genitourinary:

## 2024-11-21 ENCOUNTER — OFFICE VISIT (OUTPATIENT)
Dept: NEUROLOGY | Age: 69
End: 2024-11-21
Payer: MEDICARE

## 2024-11-21 VITALS
BODY MASS INDEX: 32.4 KG/M2 | WEIGHT: 201.6 LBS | SYSTOLIC BLOOD PRESSURE: 117 MMHG | HEART RATE: 83 BPM | HEIGHT: 66 IN | DIASTOLIC BLOOD PRESSURE: 74 MMHG

## 2024-11-21 DIAGNOSIS — G43.019 INTRACTABLE MIGRAINE WITHOUT AURA AND WITHOUT STATUS MIGRAINOSUS: Primary | ICD-10-CM

## 2024-11-21 PROCEDURE — 1159F MED LIST DOCD IN RCRD: CPT | Performed by: STUDENT IN AN ORGANIZED HEALTH CARE EDUCATION/TRAINING PROGRAM

## 2024-11-21 PROCEDURE — 1123F ACP DISCUSS/DSCN MKR DOCD: CPT | Performed by: STUDENT IN AN ORGANIZED HEALTH CARE EDUCATION/TRAINING PROGRAM

## 2024-11-21 PROCEDURE — 99214 OFFICE O/P EST MOD 30 MIN: CPT | Performed by: STUDENT IN AN ORGANIZED HEALTH CARE EDUCATION/TRAINING PROGRAM

## 2024-11-21 RX ORDER — MIRABEGRON 50 MG/1
TABLET, FILM COATED, EXTENDED RELEASE ORAL
COMMUNITY
Start: 2024-10-30

## 2024-11-21 RX ORDER — VERAPAMIL HYDROCHLORIDE 180 MG/1
180 TABLET, EXTENDED RELEASE ORAL DAILY
Qty: 30 TABLET | Refills: 5 | Status: SHIPPED | OUTPATIENT
Start: 2024-11-21 | End: 2024-12-21

## 2024-11-21 RX ORDER — MAGNESIUM OXIDE 400 MG/1
400 TABLET ORAL DAILY
Qty: 30 TABLET | Refills: 1 | Status: SHIPPED | OUTPATIENT
Start: 2024-11-21

## 2024-11-21 RX ORDER — RIZATRIPTAN BENZOATE 10 MG/1
10 TABLET ORAL PRN
Qty: 9 TABLET | Refills: 5 | Status: SHIPPED | OUTPATIENT
Start: 2024-11-21

## 2024-11-21 NOTE — PROGRESS NOTES
Initial Neurology Clinic Note    [facility]  Department of Neurology  Date of Service: 11/21/2024 at 9:55 AM      CLINIC NOTE - INITIAL VISIT    Mar Arora is a 69 y.o. right handed female who came for follow-up for migraine without aura.  Patient was unaccompanied.  PRESENTING ILLNESS:    Patient was last seen by Dr. Vasquez on 5/6/2024.  During that visit verapamil was continued at the same dose of 180 mg nightly.  Today, patient reports headaches are improved in terms of number of headache days. She did not use Maxalt in last few months.  She does not have the refills anymore.  She reports she is very disturbed  about election results and have been crying a lot. That gave her headache couple of times in the last month.  Frequency: 2-3 /month    HEADACHE CHARACTERISTICS:  Aura: no  Location: right orbital  Character: pressure like  Severity: starts at a scale of 5-6/10  Lasts for : 1-2 hours  Aggravating factors: Headache is made worse by reading, bright lights.   Relieving factors: lying down in drak room, sleep  Triggers: stress, sleep deprivation    ASSOCIATED SYMPTOMS:  (+) Nausea  (-) Vomiting      (+) Photophobia   (+) Phonophobia  (-) Osmophobia    (-) Dizziness   (-) Fatigue  (-) Focal neurological symptoms with headache- lateralised weakness, numbness, visual problem, double vision, unsteadiness of gait, dysarthria  (-) Autonomic symptoms-such as  unilateral autonomic symptoms such as flushing, ptosis, sweating, conjunctival tearing, congestion, nasal stuffiness.    SPECIAL FEATURES:  Nocturnal awakening with headache: -  Early morning headaches: -  (-) Orthostatic aggravation:  (-) Cough or sneeze headache    OTHERS  H/o Depression /anxiety, h/o Restless leg syndrome, sleep apnea- on CPAP  (-) Family history of migraine      PREVIOUS WORK UP:  MRI in past: 2019:  IMPRESSION:  1. No acute infarct, intracranial hemorrhage, or significant mass effect. No  evidence of abnormal intracranial

## 2024-11-21 NOTE — PATIENT INSTRUCTIONS
- Continue Verapamil  mg daily  - Take Maxalt 10 mg 1 tablet at headache onset. May repeat in 2 hours if headache is not better. Do not take more than 2 tablets in 24 hours.  - Take Magnesium 400 mg daily  - Take Riboflavin 400 mg  daily    MIGRAINE LIFE STYLE MODIFICATIONS:  General rule: Limit use of meds (including over the counter tablets) to 2 days a week and 10 days a month to avoid rebound headache    When you get a headache, Get into a quiet environment and try to relax ,Use cold compresses , Take the full dose of acute medication at the earliest onset of headache   Maintain a headache diary    Get 8 hours of sleep at night     Minimize stress. Try yoga, meditation, Exercise 30 minutes a day     Keep well hydrated and drink 6 to 8 glasses of water per day     Do not skip meals   Avoid certain foods -Caffeine: coffee, chocolate, cola, tea ,MSG: Chinese food, Ramen noodles, Doritos , many Cheeses: mozzarella, swiss, munster, cheddar, brie, etc.     Allowed cheeses include cottage cheese, American cheese, Velveeta. Deli meat: ham, gabriel, sausage, hot dogs    Visit the following web site:  [employer] website (http://www.VENNCOMM/)

## 2024-11-22 ENCOUNTER — TELEPHONE (OUTPATIENT)
Dept: NEUROLOGY | Age: 69
End: 2024-11-22

## 2024-11-22 DIAGNOSIS — G43.019 INTRACTABLE MIGRAINE WITHOUT AURA AND WITHOUT STATUS MIGRAINOSUS: ICD-10-CM

## 2024-11-22 NOTE — TELEPHONE ENCOUNTER
RX clarification request regarding the riboflavin was sent over. Your note says to take 400 mg once a day, but the script was sent over for 400 mg every 30 days. Do you want this monthly or everyday? Please advise, thank you!

## 2024-12-19 DIAGNOSIS — R19.5 POSITIVE COLORECTAL CANCER SCREENING USING COLOGUARD TEST: Primary | ICD-10-CM

## 2024-12-19 LAB — NONINV COLON CA DNA+OCC BLD SCRN STL QL: POSITIVE

## 2024-12-24 ENCOUNTER — TELEPHONE (OUTPATIENT)
Dept: GASTROENTEROLOGY | Age: 69
End: 2024-12-24

## 2025-01-18 ENCOUNTER — HOSPITAL ENCOUNTER (OUTPATIENT)
Dept: CT IMAGING | Age: 70
Discharge: HOME | End: 2025-01-20
Attending: INTERNAL MEDICINE
Payer: MEDICARE

## 2025-01-18 DIAGNOSIS — R91.8 OTHER NONSPECIFIC ABNORMAL FINDING OF LUNG FIELD: ICD-10-CM

## 2025-01-18 PROCEDURE — 71250 CT THORAX DX C-: CPT

## 2025-03-06 DIAGNOSIS — F33.41 RECURRENT MAJOR DEPRESSIVE DISORDER, IN PARTIAL REMISSION: ICD-10-CM

## 2025-03-06 RX ORDER — BUPROPION HYDROCHLORIDE 300 MG/1
300 TABLET ORAL EVERY MORNING
Qty: 90 TABLET | Refills: 3 | Status: SHIPPED | OUTPATIENT
Start: 2025-03-06

## 2025-03-06 NOTE — TELEPHONE ENCOUNTER
Mar Arora is calling to request a refill on the following medication(s):    Medication Request:  Requested Prescriptions     Pending Prescriptions Disp Refills    buPROPion (WELLBUTRIN XL) 300 MG extended release tablet [Pharmacy Med Name: buPROPion HCL  MG TABLET] 90 tablet 3     Sig: TAKE 1 TABLET BY MOUTH EVERY MORNING       Last Visit Date (If Applicable):  9/5/2024    Next Visit Date:    8/19/2025              Last refill 2/19/2024 Rx pending

## 2025-03-13 ENCOUNTER — TELEPHONE (OUTPATIENT)
Dept: NEUROLOGY | Age: 70
End: 2025-03-13

## 2025-03-13 NOTE — TELEPHONE ENCOUNTER
03 13 2025 called the patient times 2 (02 27 2025 and 03 06 2025 at )  to schedule follow up appointment with Dr. Kiran, left message on machine both times, no response.  I mailed the patient a letter asking them to call the office back to schedule this appointment.  KS  
No

## 2025-03-25 ENCOUNTER — TELEPHONE (OUTPATIENT)
Dept: FAMILY MEDICINE CLINIC | Age: 70
End: 2025-03-25

## 2025-03-25 DIAGNOSIS — R19.5 POSITIVE COLORECTAL CANCER SCREENING USING COLOGUARD TEST: Primary | ICD-10-CM

## 2025-03-25 NOTE — TELEPHONE ENCOUNTER
Patient had positive cologuard in the past and is requesting a referral for a colonoscopy. She would like the referral to go to [facility] gastroenterology and associates Dr Maldonado. She had a previous referral but she does not want to go there.

## 2025-03-27 DIAGNOSIS — R19.5 POSITIVE COLORECTAL CANCER SCREENING USING COLOGUARD TEST: ICD-10-CM

## 2025-03-27 DIAGNOSIS — Z12.11 SCREENING FOR COLON CANCER: Primary | ICD-10-CM

## 2025-05-27 ENCOUNTER — OFFICE VISIT (OUTPATIENT)
Dept: NEUROLOGY | Age: 70
End: 2025-05-27
Payer: MEDICARE

## 2025-05-27 VITALS
BODY MASS INDEX: 30.89 KG/M2 | HEIGHT: 65 IN | WEIGHT: 185.4 LBS | HEART RATE: 85 BPM | DIASTOLIC BLOOD PRESSURE: 70 MMHG | SYSTOLIC BLOOD PRESSURE: 114 MMHG

## 2025-05-27 DIAGNOSIS — G43.019 INTRACTABLE MIGRAINE WITHOUT AURA AND WITHOUT STATUS MIGRAINOSUS: Primary | ICD-10-CM

## 2025-05-27 PROCEDURE — 99214 OFFICE O/P EST MOD 30 MIN: CPT | Performed by: STUDENT IN AN ORGANIZED HEALTH CARE EDUCATION/TRAINING PROGRAM

## 2025-05-27 PROCEDURE — 1159F MED LIST DOCD IN RCRD: CPT | Performed by: STUDENT IN AN ORGANIZED HEALTH CARE EDUCATION/TRAINING PROGRAM

## 2025-05-27 PROCEDURE — 1123F ACP DISCUSS/DSCN MKR DOCD: CPT | Performed by: STUDENT IN AN ORGANIZED HEALTH CARE EDUCATION/TRAINING PROGRAM

## 2025-05-27 NOTE — PROGRESS NOTES
Neurology Clinic Note    [facility]  Department of Neurology  Date of Service: 5/27/2025 at 10:24 AM      CLINIC NOTE -follow-up visit    Mar Arora is a 70 y.o. right handed female who came for follow-up for migraine without aura.  Patient was unaccompanied.  PRESENTING ILLNESS:      Mar presents for a follow-up of chronic migraine. She reports continued excellent response to verapamil 180 mg daily, with no significant side effects. She is currently experiencing approximately one headache per month, which responds well to her rescue medication within about 15 minutes, with complete symptom resolution. She remains on magnesium supplementation. No new headache features, visual changes, or focal neurological symptoms are reported. She feels stable and satisfied with her current regimen. No additional concerns at this time.  Frequency: 1 /month    HEADACHE CHARACTERISTICS:  Aura: no  Location: right orbital  Character: pressure like  Severity: starts at a scale of 5-6/10  Lasts for : 1-2 hours  Aggravating factors: Headache is made worse by reading, bright lights.   Relieving factors: lying down in drak room, sleep  Triggers: stress, sleep deprivation    ASSOCIATED SYMPTOMS:  (+) Nausea  (-) Vomiting      (+) Photophobia   (+) Phonophobia  (-) Osmophobia    (-) Dizziness   (-) Fatigue  (-) Focal neurological symptoms with headache- lateralised weakness, numbness, visual problem, double vision, unsteadiness of gait, dysarthria  (-) Autonomic symptoms-such as  unilateral autonomic symptoms such as flushing, ptosis, sweating, conjunctival tearing, congestion, nasal stuffiness.    SPECIAL FEATURES:  Nocturnal awakening with headache: -  Early morning headaches: -  (-) Orthostatic aggravation:  (-) Cough or sneeze headache    OTHERS  H/o Depression /anxiety, h/o Restless leg syndrome, sleep apnea- on CPAP  (-) Family history of migraine      PREVIOUS WORK UP:  MRI in past: 2019:  IMPRESSION:  1. No

## 2025-08-01 ENCOUNTER — TELEPHONE (OUTPATIENT)
Dept: NEUROLOGY | Age: 70
End: 2025-08-01

## (undated) DEVICE — CUP MED 1OZ CLR POLYPR FEED GRAD W/O LID

## (undated) DEVICE — CO2 CANNULA,SUPERSOFT, ADLT,7'O2,7'CO2: Brand: MEDLINE

## (undated) DEVICE — MEDICINE CUP, GRADUATED, STER: Brand: MEDLINE

## (undated) DEVICE — FORCEPS BX L240CM JAW DIA2.2MM RAD JAW 4 HOT DISP

## (undated) DEVICE — CONMED DISPOSABLE GASTROINTESTINAL CYTOLOGY BRUSH, STRAIGHT HANDLE, 2.5 MM X 160 CM: Brand: CONMED

## (undated) DEVICE — BLOCK BITE 60FR RUBBER ADLT DENTAL

## (undated) DEVICE — GLOVE SURG 7 11.7IN BEAD CUF LIGHT BRN SENSICARE LTX FREE

## (undated) DEVICE — ADAPTER TBNG LUER STUB 15 GA INTMED

## (undated) DEVICE — YANKAUER,FLEXIBLE HANDLE,REGLR CAPACITY: Brand: MEDLINE INDUSTRIES, INC.

## (undated) DEVICE — TUBING, SUCTION, 1/4" X 12', STRAIGHT: Brand: MEDLINE

## (undated) DEVICE — TRAP SURG QUAD PARABOLA SLOT DSGN SFTY SCRN TRAPEASE

## (undated) DEVICE — GOWN,AURORA,NONREINFORCED,LARGE: Brand: MEDLINE

## (undated) DEVICE — JELLY,LUBE,STERILE,FLIP TOP,TUBE,2-OZ: Brand: MEDLINE

## (undated) DEVICE — FORCEPS BX L240CM WRK CHN 2.8MM STD CAP W/ NDL MIC MESH

## (undated) DEVICE — BASIN EMSIS 700ML GRAPHITE PLAS KID SHP GRAD

## (undated) DEVICE — Device: Brand: DEFENDO VALVE AND CONNECTOR KIT

## (undated) DEVICE — GAUZE,SPONGE,4"X4",16PLY,STRL,LF,10/TRAY: Brand: MEDLINE

## (undated) DEVICE — SYRINGE MED 50ML LUERLOCK TIP